# Patient Record
Sex: FEMALE | Race: BLACK OR AFRICAN AMERICAN | NOT HISPANIC OR LATINO | Employment: UNEMPLOYED | URBAN - METROPOLITAN AREA
[De-identification: names, ages, dates, MRNs, and addresses within clinical notes are randomized per-mention and may not be internally consistent; named-entity substitution may affect disease eponyms.]

---

## 2018-04-17 ENCOUNTER — TELEPHONE (OUTPATIENT)
Dept: NEUROSURGERY | Facility: CLINIC | Age: 58
End: 2018-04-17

## 2018-06-06 ENCOUNTER — TELEPHONE (OUTPATIENT)
Dept: NEUROSURGERY | Facility: CLINIC | Age: 58
End: 2018-06-06

## 2019-12-13 ENCOUNTER — OFFICE VISIT (OUTPATIENT)
Dept: FAMILY MEDICINE CLINIC | Facility: CLINIC | Age: 59
End: 2019-12-13

## 2019-12-13 VITALS
HEART RATE: 52 BPM | OXYGEN SATURATION: 99 % | BODY MASS INDEX: 38.82 KG/M2 | HEIGHT: 65 IN | SYSTOLIC BLOOD PRESSURE: 146 MMHG | DIASTOLIC BLOOD PRESSURE: 81 MMHG | TEMPERATURE: 98.3 F | RESPIRATION RATE: 16 BRPM | WEIGHT: 233 LBS

## 2019-12-13 DIAGNOSIS — R22.41 NODULE OF SKIN OF RIGHT LOWER EXTREMITY: ICD-10-CM

## 2019-12-13 DIAGNOSIS — E66.9 OBESITY (BMI 30-39.9): ICD-10-CM

## 2019-12-13 DIAGNOSIS — R20.0 NUMBNESS AND TINGLING OF BOTH FEET: Primary | ICD-10-CM

## 2019-12-13 DIAGNOSIS — M17.11 OSTEOARTHRITIS OF RIGHT KNEE, UNSPECIFIED OSTEOARTHRITIS TYPE: ICD-10-CM

## 2019-12-13 DIAGNOSIS — M25.551 RIGHT HIP PAIN: ICD-10-CM

## 2019-12-13 DIAGNOSIS — R20.2 NUMBNESS AND TINGLING OF BOTH FEET: Primary | ICD-10-CM

## 2019-12-13 PROBLEM — E78.00 HYPERCHOLESTEREMIA: Status: ACTIVE | Noted: 2019-12-13

## 2019-12-13 PROBLEM — R53.83 FATIGUE: Status: ACTIVE | Noted: 2018-10-23

## 2019-12-13 PROBLEM — G89.29 CHRONIC LEFT HIP PAIN: Status: ACTIVE | Noted: 2018-10-23

## 2019-12-13 PROBLEM — R82.90 ABNORMAL URINE FINDING: Status: ACTIVE | Noted: 2018-10-23

## 2019-12-13 PROBLEM — I25.10 CAD (CORONARY ARTERY DISEASE), NATIVE CORONARY ARTERY: Status: ACTIVE | Noted: 2019-12-13

## 2019-12-13 PROBLEM — I16.0 HYPERTENSIVE URGENCY: Status: ACTIVE | Noted: 2018-04-17

## 2019-12-13 PROBLEM — I48.91 ATRIAL FIBRILLATION: Status: ACTIVE | Noted: 2018-05-23

## 2019-12-13 PROBLEM — R29.90 STROKE-LIKE SYMPTOM: Status: ACTIVE | Noted: 2018-04-17

## 2019-12-13 PROBLEM — R00.2 PALPITATIONS: Status: ACTIVE | Noted: 2019-10-18

## 2019-12-13 PROBLEM — R42 DIZZINESS: Status: ACTIVE | Noted: 2018-10-23

## 2019-12-13 PROBLEM — R10.30 LOWER ABDOMINAL PAIN: Status: ACTIVE | Noted: 2019-02-05

## 2019-12-13 PROBLEM — M25.552 CHRONIC LEFT HIP PAIN: Status: ACTIVE | Noted: 2018-10-23

## 2019-12-13 PROCEDURE — 99203 OFFICE O/P NEW LOW 30 MIN: CPT | Performed by: FAMILY MEDICINE

## 2019-12-13 PROCEDURE — 73502 X-RAY EXAM HIP UNI 2-3 VIEWS: CPT | Performed by: FAMILY MEDICINE

## 2019-12-13 RX ORDER — METOPROLOL SUCCINATE 200 MG/1
TABLET, EXTENDED RELEASE ORAL
COMMUNITY
Start: 2019-10-18 | End: 2020-07-21 | Stop reason: SDUPTHER

## 2019-12-13 RX ORDER — CHLORTHALIDONE 25 MG/1
TABLET ORAL
COMMUNITY
Start: 2019-10-18 | End: 2020-07-21 | Stop reason: SDUPTHER

## 2019-12-13 RX ORDER — ATORVASTATIN CALCIUM 20 MG/1
20 TABLET, FILM COATED ORAL DAILY
COMMUNITY
Start: 2019-08-07 | End: 2020-08-06

## 2019-12-13 RX ORDER — ALPRAZOLAM 1 MG/1
1 TABLET ORAL
COMMUNITY
Start: 2017-03-14 | End: 2020-02-06

## 2019-12-13 RX ORDER — PRENATAL VIT 91/IRON/FOLIC/DHA 28-975-200
COMBINATION PACKAGE (EA) ORAL
Status: ON HOLD | COMMUNITY
Start: 2019-01-25 | End: 2020-08-25

## 2019-12-13 RX ORDER — NAPROXEN 500 MG/1
TABLET ORAL
COMMUNITY
Start: 2019-12-03 | End: 2020-07-21 | Stop reason: SDUPTHER

## 2019-12-13 RX ORDER — MECLIZINE HYDROCHLORIDE 25 MG/1
25 TABLET ORAL
COMMUNITY
Start: 2018-10-23 | End: 2020-08-24

## 2019-12-13 RX ORDER — LAMOTRIGINE 200 MG/1
200 TABLET ORAL NIGHTLY
COMMUNITY
Start: 2018-11-15

## 2019-12-13 RX ORDER — OLMESARTAN MEDOXOMIL 20 MG/1
TABLET ORAL
COMMUNITY
Start: 2019-05-14 | End: 2020-07-21 | Stop reason: SDUPTHER

## 2019-12-13 RX ORDER — AMLODIPINE BESYLATE 5 MG/1
TABLET ORAL
COMMUNITY
Start: 2019-10-15 | End: 2020-07-21 | Stop reason: SDUPTHER

## 2019-12-13 RX ORDER — TRAZODONE HYDROCHLORIDE 50 MG/1
TABLET ORAL
Status: ON HOLD | COMMUNITY
Start: 2018-11-15 | End: 2020-08-25

## 2019-12-13 NOTE — PROGRESS NOTES
Subjective   Henny James is a 59 y.o. female.     59-year-old female is a new patient to me presents today with 1 pain right side of her hip which has no feeling but it hurts accordingly patient also reports bilateral stabbing sharp pains in the feet and that they go numb at times.    Per records it looks like she had appointment with orthopedics which she no showed to a couple of months ago.  She has a history of lumbar back pain per records.  She is also referred to neurosurgery in the past.  Did not show up to that appointment.  Looks like her last office visit with the primary care doctor was in January 2019.  History of 5 C-sections with reconstructive surgery.  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  History of vertigo; eye doctor says she is using readers which is contributing to dizziness.    Per records history of toe sweating and burning.    Patient reports follows with a cardiologist.  Is on amlodipine 5 mg a day and chlorthalidone 25 mg a day Toprol- mg a day as well as olmesartan 20 mg a day.  Blood pressure in the office today is 146/81.  Denies chest pain shortness breath headache or vision changes.    Also psychiatry is on Lamictal, Xanax, trazodone.    Today reports lump on right hip for over the past year which has gotten worse in terms of size and is more painful with palpation.  No erythema no color changes no drainage.  No trauma or injury she can recall.  Associated with hip pain.    Also reports bilateral foot numbness and tingling.  Due for several labs today.  Denies history of diabetes.  Reports low back pain chronically.  Also reports left knee osteoarthritis; had laser surgery in the past; pain is returning and getting worse over the past month.    Per records has MRI of cervical spine done in January 2017. This showed a mild spondylolisthesis in the mid cervical spine without high-grade spinal stenosis.  Patient was  referred to neurosurgery in the past however did not make appointment.  Not sure why she was referred.       PHQ-2/PHQ-9 Depression Screening 12/13/2019   Little interest or pleasure in doing things 1   Feeling down, depressed, or hopeless 1   Trouble falling or staying asleep, or sleeping too much 2   Feeling tired or having little energy 3   Feeling bad about yourself - or that you are a failure or have let yourself or your family down 0   Trouble concentrating on things, such as reading the newspaper or watching television 2   Moving or speaking so slowly that other people could have noticed. Or the opposite - being so fidgety or restless that you have been moving around a lot more than usual 0   Thoughts that you would be better off dead, or of hurting yourself in some way 0   Total Score 9   If you checked off any problems, how difficult have these problems made it for you to do your work, take care of things at home, or get along with other people? Very difficult       The following portions of the patient's history were reviewed and updated as appropriate: allergies, current medications, past family history, past medical history, past social history, past surgical history and problem list.    Review of Systems   Constitutional: Negative for chills and fever.   Respiratory: Negative for cough and shortness of breath.    Cardiovascular: Negative for chest pain, palpitations and leg swelling.   Musculoskeletal: Positive for arthralgias, back pain and myalgias.   Neurological: Positive for numbness. Negative for weakness.   Psychiatric/Behavioral: Positive for stress.       Objective   Physical Exam   Constitutional: She appears well-developed and well-nourished.   HENT:   Head: Normocephalic and atraumatic.   Eyes: Pupils are equal, round, and reactive to light. Conjunctivae and EOM are normal.   Neck: Normal range of motion. Neck supple.   Pulmonary/Chest: Effort normal.   Musculoskeletal:   Nodule at right hip  tender to palpation about 1.5 cm in diameter.  No erythema or drainage or surrounding swelling or induration   Psychiatric: She has a normal mood and affect.               Assessment/Plan     Henny was seen today for establish care, hip pain and foot pain.    Diagnoses and all orders for this visit:    Numbness and tingling of both feet  -     Vitamin B12 & Folate  -     Comprehensive Metabolic Panel  -     CBC & Differential  -     Hemoglobin A1c  -     Lipid Panel  -     TSH Rfx On Abnormal To Free T4    Obesity (BMI 30-39.9)  -     Hemoglobin A1c  -     Lipid Panel    Right hip pain  -     XR Hip With or Without Pelvis 2 - 3 View Right    Nodule of skin of right lower extremity  -     US Nonvascular Extremity Limited; Future    Osteoarthritis of right knee, unspecified osteoarthritis type  -     Ambulatory Referral to Orthopedic Surgery      Follow-up x-ray and ultrasound.  Consider referral to general surgery/orthopedics for this as well.  Follow-up labs.  Patient may need to see neurosurgery due to worsening neck pain; associated with numbness of upper extremities bilaterally.  Return to clinic in 6 weeks for follow-up or sooner if needed.  Continue care per psychiatry and cardiology.

## 2019-12-19 ENCOUNTER — TELEPHONE (OUTPATIENT)
Dept: FAMILY MEDICINE CLINIC | Facility: CLINIC | Age: 59
End: 2019-12-19

## 2019-12-19 DIAGNOSIS — M54.2 CHRONIC NECK PAIN: Primary | ICD-10-CM

## 2019-12-19 DIAGNOSIS — G89.29 CHRONIC NECK PAIN: Primary | ICD-10-CM

## 2019-12-27 ENCOUNTER — TELEPHONE (OUTPATIENT)
Dept: FAMILY MEDICINE CLINIC | Facility: CLINIC | Age: 59
End: 2019-12-27

## 2019-12-27 NOTE — TELEPHONE ENCOUNTER
Pt called c/o of vaginal bleeding/spotting today.  Pt wasn't sure if it was coming from rectum - she thinks it's vaginal.  Pt is post menopausal and has hx of cancer.  Advised pt to go to ER - pt prefers to go to Urgent Care because of cost.

## 2020-01-24 ENCOUNTER — OFFICE VISIT (OUTPATIENT)
Dept: FAMILY MEDICINE CLINIC | Facility: CLINIC | Age: 60
End: 2020-01-24

## 2020-01-24 VITALS
RESPIRATION RATE: 16 BRPM | BODY MASS INDEX: 3.83 KG/M2 | DIASTOLIC BLOOD PRESSURE: 76 MMHG | WEIGHT: 23 LBS | HEIGHT: 65 IN | HEART RATE: 51 BPM | OXYGEN SATURATION: 97 % | SYSTOLIC BLOOD PRESSURE: 132 MMHG | TEMPERATURE: 97.7 F

## 2020-01-24 DIAGNOSIS — M43.12 SPONDYLOLISTHESIS OF CERVICAL REGION: ICD-10-CM

## 2020-01-24 DIAGNOSIS — R20.0 NUMBNESS AND TINGLING OF BOTH FEET: Primary | ICD-10-CM

## 2020-01-24 DIAGNOSIS — R22.41 NODULE OF SKIN OF RIGHT LOWER EXTREMITY: ICD-10-CM

## 2020-01-24 DIAGNOSIS — R20.2 NUMBNESS AND TINGLING OF BOTH FEET: Primary | ICD-10-CM

## 2020-01-24 PROCEDURE — 99213 OFFICE O/P EST LOW 20 MIN: CPT | Performed by: FAMILY MEDICINE

## 2020-01-24 RX ORDER — DIAZEPAM 5 MG/1
5 TABLET ORAL
Qty: 1 TABLET | Refills: 0 | Status: SHIPPED | OUTPATIENT
Start: 2020-01-24 | End: 2020-01-24

## 2020-01-24 NOTE — PROGRESS NOTES
Subjective   Henny James is a 59 y.o. female.     59-year-old female presents today for 6-week follow-up on hip pain and numbness and tingling the feet.  Still waiting on lab results she got them done at [facility] therefore we do not have them in our system.  Ultrasound for nodule skin the right lower extremity was negative for any abnormality.  Since then the area near her right hip/outer thigh still painful.    Patient claims she was told by her gynecologist that labs that I had ordered were normal; she had brought the results that appointment; does not have them now.  Wants referral to podiatrist for bilateral numbness of feet.  Still need to review the labs; try to get the from [facility].    Needs new MRI of cervical spine for refer to neurosurgery again his last MRI was in 2017.    Wants referral to podiatry due to continued numbness and tingling of her feet.  Patient was referred to orthopedics for osteoarthritis of right knee.  Reports she gotten the injection thinks it was steroid; helped minimally.  Has follow-up coming soon.           The following portions of the patient's history were reviewed and updated as appropriate: allergies, current medications, past family history, past medical history, past social history, past surgical history and problem list.    Review of Systems   Constitutional: Negative for chills and fever.   Musculoskeletal: Positive for arthralgias, myalgias and neck pain.   Neurological: Negative for weakness and numbness.       Objective   Physical Exam   Constitutional: She appears well-developed and well-nourished.   HENT:   Head: Normocephalic and atraumatic.   Eyes: Pupils are equal, round, and reactive to light. Conjunctivae and EOM are normal.   Neck: Normal range of motion. Neck supple.   Pulmonary/Chest: Effort normal.   Psychiatric: She has a normal mood and affect.               Assessment/Plan     Henny was seen today for numbness and tingling and hip  pain.    Diagnoses and all orders for this visit:    Numbness and tingling of both feet  -     Ambulatory Referral to Podiatry    Nodule of skin of right lower extremity  -     Ambulatory Referral to General Surgery    Spondylolisthesis of cervical region  -     MRI Cervical Spine Without Contrast; Future  -     diazePAM (VALIUM) 5 MG tablet; Take 1 tablet by mouth Once for 1 dose. For cervical MRI

## 2020-02-06 ENCOUNTER — OFFICE VISIT (OUTPATIENT)
Dept: SURGERY | Facility: CLINIC | Age: 60
End: 2020-02-06

## 2020-02-06 VITALS — WEIGHT: 230 LBS | BODY MASS INDEX: 38.32 KG/M2 | HEIGHT: 65 IN

## 2020-02-06 DIAGNOSIS — M25.551 RIGHT HIP PAIN: Primary | ICD-10-CM

## 2020-02-06 PROCEDURE — 99203 OFFICE O/P NEW LOW 30 MIN: CPT | Performed by: SURGERY

## 2020-02-08 ENCOUNTER — HOSPITAL ENCOUNTER (OUTPATIENT)
Dept: MRI IMAGING | Facility: HOSPITAL | Age: 60
Discharge: HOME | End: 2020-02-08
Admitting: FAMILY MEDICINE

## 2020-02-08 DIAGNOSIS — M43.12 SPONDYLOLISTHESIS OF CERVICAL REGION: ICD-10-CM

## 2020-02-08 PROCEDURE — 72141 MRI NECK SPINE W/O DYE: CPT

## 2020-02-19 NOTE — PROGRESS NOTES
General Surgery  Initial Office Visit    CC: Right hip mass    HPI: The patient is a pleasant 59 y.o. year-old lady who presents today for evaluation of a bulge/mass along her right lateral hip that she noticed about 1 month ago after she fell and hit her right hip on a scale on the ground.  Since that time she has noticed the fatty tissue of the right hip looks more protuberant than on the left and is tender to the touch with some paresthesias.  She does not feel a palpable mass at that location and has had no overlying skin changes.  She was referred to see me out of concern that the lump on her right hip may represent a lipoma or a cyst.  Her primary care provider, Dr. Arita, checked an ultrasound of the right hip where there was no solid or cystic soft tissue mass identified at the site of palpable concern.    Past Medical History:   Hypertension  Atrial fibrillation  Coronary artery disease with prior myocardial infarction  Hyperlipidemia  Intermittent claudication  Anemia  Osteoarthritis  Bipolar disorder  History of transient ischemic attack  History of gist tumor of the stomach resected   Anxiety with depression  History of suicide attempt and cocaine abuse    Past Surgical History:   Resection of 7 cm gist tumor of the stomach   section x5  Splenectomy  Tonsillectomy  Tubal ligation    Medications:   Vitamin D  Vitamin B12  Vitamin B6  Aspirin  Lomotil  Xanax  Metoprolol  Hair/nail collagen supplement  Naproxen    Allergies: No known drug allergies    Family History: Mother with coronary artery disease, daughter with bipolar disorder, son with alcohol abuse    Social History: , non-smoker, social alcohol use once per month    ROS:   Constitutional: Positive for activity change, fatigue, and unexpected weight gain; negative for fevers or chills  HENT: Negative for hearing loss or runny nose  Eyes: Negative for vision changes or scleral icterus  Respiratory: Negative for cough or shortness  of breath  Cardiovascular: Positive for irregular heartbeat due to atrial fibrillation; negative for chest pain  Gastrointestinal: Negative for abdominal pain, nausea, vomiting, constipation, melena, or hematochezia  Genitourinary: Negative for hematuria or dysuria  Musculoskeletal: Positive for back pain, joint pain, neck pain, and neck stiffness; negative for joint swelling or gait instability  Neurologic: Negative for tremors or seizures  Psychiatric: Positive for anxiety and depression; negative for suicidal ideations  All other systems reviewed and negative    Physical Exam:  Height: 165 cm  Weight: 104 kg  BMI: 38.3  General: No acute distress, well-nourished & well-developed  HEAD: normocephalic, atraumatic  EYES: normal conjunctiva, sclera anicteric  EARS: grossly normal hearing  NECK: supple, no thyromegaly  CARDIOVASCULAR: regular rate and rhythm  RESPIRATORY: clear to auscultation bilaterally  GASTROINTESTINAL: soft, nontender, non-distended  MUSCULOSKELETAL: There is subtle asymmetry of the right hip in comparison to the left laterally where there is slightly more prominent fatty subcutaneous tissue but there is no underlying palpable well-circumscribed mass to represent a lipoma and no skin mass to represent a sebaceous cyst at that region.  PSYCHIATRIC: oriented x3, normal mood and affect    IMAGING:  Ultrasound extremity nonvascular Limited (right hip), 12/17/2019:  No solid or cystic lesion is seen in the area of palpable complaint over the right hip.  No abnormal blood flow.  No foreign body.    ASSESSMENT & PLAN  Ms. James is a 59-year-old lady with slight asymmetry of her right hip subcutaneous tissue but no underlying palpable lipoma or sebaceous cyst is identified at that location.  I reviewed her most recent ultrasound of the left hip, which confirms my suspicion that there is nothing surgical to be done for her slight asymmetric fat distribution.  Given there is nothing that would require  surgery, she can follow-up with me as needed and I do not feel strongly that she requires any further imaging for work-up of her asymmetric subcutaneous fat around the hip joints.  I suspect she may have some fat necrosis of the subcutaneous fat along the right hip following her recent fall, and this would explain the numbness and intermittent pain with palpation.    Sara Wheeler MD  General and Endoscopic Surgery  [facility]    [address], [address]  [place], [place], [address]  P: [phone]  F: [phone]

## 2020-02-21 DIAGNOSIS — M43.12 SPONDYLOLISTHESIS OF CERVICAL REGION: ICD-10-CM

## 2020-02-21 DIAGNOSIS — R20.0 NUMBNESS AND TINGLING OF BOTH FEET: Primary | ICD-10-CM

## 2020-02-21 DIAGNOSIS — R20.2 NUMBNESS AND TINGLING OF BOTH FEET: Primary | ICD-10-CM

## 2020-02-21 NOTE — PROGRESS NOTES
MRI of cervical spine does not show spondylolisthesis.  There is some minimal canal narrowing at C4-C5 and C5-C6.  There is mild to moderate foraminal narrowing at C3-C4.  Due to continued neck pain and and issues will refer to neurosurgery for further assessment.  No disc herniation.  Please place referral for neurosurgery.  We had placed 1 before however they wanted an up-to-date MRI of her cervical spine before they saw her.

## 2020-07-21 ENCOUNTER — OFFICE VISIT (OUTPATIENT)
Dept: FAMILY MEDICINE CLINIC | Facility: CLINIC | Age: 60
End: 2020-07-21

## 2020-07-21 VITALS
RESPIRATION RATE: 16 BRPM | DIASTOLIC BLOOD PRESSURE: 88 MMHG | BODY MASS INDEX: 38.49 KG/M2 | HEART RATE: 55 BPM | TEMPERATURE: 96.6 F | OXYGEN SATURATION: 98 % | WEIGHT: 231 LBS | HEIGHT: 65 IN | SYSTOLIC BLOOD PRESSURE: 140 MMHG

## 2020-07-21 DIAGNOSIS — G89.29 CHRONIC PAIN OF BOTH KNEES: ICD-10-CM

## 2020-07-21 DIAGNOSIS — M25.562 CHRONIC PAIN OF BOTH KNEES: ICD-10-CM

## 2020-07-21 DIAGNOSIS — M17.0 OSTEOARTHRITIS OF BOTH KNEES, UNSPECIFIED OSTEOARTHRITIS TYPE: ICD-10-CM

## 2020-07-21 DIAGNOSIS — M25.561 CHRONIC PAIN OF BOTH KNEES: ICD-10-CM

## 2020-07-21 DIAGNOSIS — Z90.81 HISTORY OF SPLENECTOMY: Primary | ICD-10-CM

## 2020-07-21 DIAGNOSIS — Z23 ENCOUNTER FOR ADMINISTRATION OF VACCINE: ICD-10-CM

## 2020-07-21 DIAGNOSIS — I10 ESSENTIAL HYPERTENSION: ICD-10-CM

## 2020-07-21 PROCEDURE — 99214 OFFICE O/P EST MOD 30 MIN: CPT | Performed by: FAMILY MEDICINE

## 2020-07-21 RX ORDER — OLMESARTAN MEDOXOMIL 20 MG/1
20 TABLET ORAL DAILY
Qty: 30 TABLET | Refills: 1 | Status: SHIPPED | OUTPATIENT
Start: 2020-07-21 | End: 2020-08-27 | Stop reason: HOSPADM

## 2020-07-21 RX ORDER — CHLORTHALIDONE 25 MG/1
25 TABLET ORAL DAILY
Qty: 30 TABLET | Refills: 1 | Status: SHIPPED | OUTPATIENT
Start: 2020-07-21 | End: 2020-08-27 | Stop reason: HOSPADM

## 2020-07-21 RX ORDER — AMLODIPINE BESYLATE 5 MG/1
5 TABLET ORAL DAILY
Qty: 30 TABLET | Refills: 1 | Status: ON HOLD | OUTPATIENT
Start: 2020-07-21 | End: 2020-08-26 | Stop reason: SDUPTHER

## 2020-07-21 RX ORDER — NAPROXEN 500 MG/1
500 TABLET ORAL 2 TIMES DAILY PRN
Qty: 60 TABLET | Refills: 1 | Status: SHIPPED | OUTPATIENT
Start: 2020-07-21 | End: 2020-12-07

## 2020-07-21 RX ORDER — ARIPIPRAZOLE 10 MG/1
10 TABLET ORAL NIGHTLY
COMMUNITY

## 2020-07-21 RX ORDER — METOPROLOL SUCCINATE 200 MG/1
200 TABLET, EXTENDED RELEASE ORAL DAILY
Qty: 30 TABLET | Refills: 1 | Status: SHIPPED | OUTPATIENT
Start: 2020-07-21 | End: 2020-09-16

## 2020-07-22 PROCEDURE — 90734 MENACWYD/MENACWYCRM VACC IM: CPT | Performed by: FAMILY MEDICINE

## 2020-07-22 PROCEDURE — 90471 IMMUNIZATION ADMIN: CPT | Performed by: FAMILY MEDICINE

## 2020-08-24 ENCOUNTER — APPOINTMENT (OUTPATIENT)
Dept: CT IMAGING | Facility: HOSPITAL | Age: 60
End: 2020-08-24

## 2020-08-24 ENCOUNTER — APPOINTMENT (OUTPATIENT)
Dept: GENERAL RADIOLOGY | Facility: HOSPITAL | Age: 60
End: 2020-08-24

## 2020-08-24 ENCOUNTER — HOSPITAL ENCOUNTER (INPATIENT)
Facility: HOSPITAL | Age: 60
LOS: 3 days | Discharge: HOME | End: 2020-08-27
Attending: EMERGENCY MEDICINE | Admitting: HOSPITALIST

## 2020-08-24 ENCOUNTER — OFFICE VISIT (OUTPATIENT)
Dept: FAMILY MEDICINE CLINIC | Facility: CLINIC | Age: 60
End: 2020-08-24

## 2020-08-24 VITALS
HEIGHT: 65 IN | BODY MASS INDEX: 37.99 KG/M2 | HEART RATE: 55 BPM | TEMPERATURE: 97.1 F | SYSTOLIC BLOOD PRESSURE: 184 MMHG | DIASTOLIC BLOOD PRESSURE: 81 MMHG | WEIGHT: 228 LBS

## 2020-08-24 DIAGNOSIS — I16.0 HYPERTENSIVE URGENCY: Primary | ICD-10-CM

## 2020-08-24 DIAGNOSIS — H53.9 VISION DISTURBANCE: Primary | ICD-10-CM

## 2020-08-24 DIAGNOSIS — I10 HYPERTENSION NOT AT GOAL: ICD-10-CM

## 2020-08-24 DIAGNOSIS — I10 ESSENTIAL HYPERTENSION: ICD-10-CM

## 2020-08-24 PROBLEM — F31.9 BIPOLAR 1 DISORDER: Status: ACTIVE | Noted: 2020-08-24

## 2020-08-24 PROBLEM — E78.5 HLD (HYPERLIPIDEMIA): Status: ACTIVE | Noted: 2019-12-13

## 2020-08-24 PROBLEM — E87.6 HYPOKALEMIA: Status: ACTIVE | Noted: 2020-08-24

## 2020-08-24 LAB
ALBUMIN SERPL-MCNC: 4.4 G/DL (ref 3.5–5.2)
ALBUMIN SERPL-MCNC: 4.5 G/DL (ref 3.5–5.2)
ALBUMIN/GLOB SERPL: 1.1 G/DL
ALBUMIN/GLOB SERPL: 1.2 G/DL
ALP SERPL-CCNC: 79 U/L (ref 39–117)
ALP SERPL-CCNC: 81 U/L (ref 39–117)
ALT SERPL W P-5'-P-CCNC: 22 U/L (ref 1–33)
ALT SERPL W P-5'-P-CCNC: 23 U/L (ref 1–33)
AMPHET+METHAMPHET UR QL: NEGATIVE
ANION GAP SERPL CALCULATED.3IONS-SCNC: 10.7 MMOL/L (ref 5–15)
ANION GAP SERPL CALCULATED.3IONS-SCNC: 12.7 MMOL/L (ref 5–15)
AST SERPL-CCNC: 19 U/L (ref 1–32)
AST SERPL-CCNC: 20 U/L (ref 1–32)
BARBITURATES UR QL SCN: NEGATIVE
BASOPHILS # BLD AUTO: 0.06 10*3/MM3 (ref 0–0.2)
BASOPHILS NFR BLD AUTO: 0.9 % (ref 0–1.5)
BENZODIAZ UR QL SCN: NEGATIVE
BILIRUB SERPL-MCNC: 0.2 MG/DL (ref 0–1.2)
BILIRUB SERPL-MCNC: 0.3 MG/DL (ref 0–1.2)
BILIRUB UR QL STRIP: NEGATIVE
BUN SERPL-MCNC: 13 MG/DL (ref 6–20)
BUN SERPL-MCNC: 13 MG/DL (ref 6–20)
BUN/CREAT SERPL: 19.4 (ref 7–25)
BUN/CREAT SERPL: 25 (ref 7–25)
CALCIUM SPEC-SCNC: 9.4 MG/DL (ref 8.6–10.5)
CALCIUM SPEC-SCNC: 9.7 MG/DL (ref 8.6–10.5)
CANNABINOIDS SERPL QL: POSITIVE
CHLORIDE SERPL-SCNC: 100 MMOL/L (ref 98–107)
CHLORIDE SERPL-SCNC: 98 MMOL/L (ref 98–107)
CLARITY UR: CLEAR
CO2 SERPL-SCNC: 24.3 MMOL/L (ref 22–29)
CO2 SERPL-SCNC: 28.3 MMOL/L (ref 22–29)
COCAINE UR QL: NEGATIVE
COLOR UR: YELLOW
CREAT SERPL-MCNC: 0.52 MG/DL (ref 0.57–1)
CREAT SERPL-MCNC: 0.67 MG/DL (ref 0.57–1)
DEPRECATED RDW RBC AUTO: 45.7 FL (ref 37–54)
DEPRECATED RDW RBC AUTO: 47.2 FL (ref 37–54)
EOSINOPHIL # BLD AUTO: 0.08 10*3/MM3 (ref 0–0.4)
EOSINOPHIL NFR BLD AUTO: 1.2 % (ref 0.3–6.2)
ERYTHROCYTE [DISTWIDTH] IN BLOOD BY AUTOMATED COUNT: 14.2 % (ref 12.3–15.4)
ERYTHROCYTE [DISTWIDTH] IN BLOOD BY AUTOMATED COUNT: 14.4 % (ref 12.3–15.4)
ETHANOL BLD-MCNC: <10 MG/DL (ref 0–10)
ETHANOL UR QL: <0.01 %
GFR SERPL CREATININE-BSD FRML MDRD: 110 ML/MIN/1.73
GFR SERPL CREATININE-BSD FRML MDRD: 147 ML/MIN/1.73
GLOBULIN UR ELPH-MCNC: 3.7 GM/DL
GLOBULIN UR ELPH-MCNC: 4 GM/DL
GLUCOSE BLDC GLUCOMTR-MCNC: 93 MG/DL (ref 70–130)
GLUCOSE SERPL-MCNC: 100 MG/DL (ref 65–99)
GLUCOSE SERPL-MCNC: 86 MG/DL (ref 65–99)
GLUCOSE UR STRIP-MCNC: NEGATIVE MG/DL
HCT VFR BLD AUTO: 38.1 % (ref 34–46.6)
HCT VFR BLD AUTO: 39 % (ref 34–46.6)
HGB BLD-MCNC: 12.5 G/DL (ref 12–15.9)
HGB BLD-MCNC: 12.7 G/DL (ref 12–15.9)
HGB UR QL STRIP.AUTO: NEGATIVE
HOLD SPECIMEN: NORMAL
IMM GRANULOCYTES # BLD AUTO: 0.03 10*3/MM3 (ref 0–0.05)
IMM GRANULOCYTES NFR BLD AUTO: 0.5 % (ref 0–0.5)
KETONES UR QL STRIP: NEGATIVE
LEUKOCYTE ESTERASE UR QL STRIP.AUTO: NEGATIVE
LYMPHOCYTES # BLD AUTO: 2.99 10*3/MM3 (ref 0.7–3.1)
LYMPHOCYTES NFR BLD AUTO: 46.2 % (ref 19.6–45.3)
MCH RBC QN AUTO: 28.5 PG (ref 26.6–33)
MCH RBC QN AUTO: 29.3 PG (ref 26.6–33)
MCHC RBC AUTO-ENTMCNC: 32.1 G/DL (ref 31.5–35.7)
MCHC RBC AUTO-ENTMCNC: 33.3 G/DL (ref 31.5–35.7)
MCV RBC AUTO: 88 FL (ref 79–97)
MCV RBC AUTO: 88.8 FL (ref 79–97)
METHADONE UR QL SCN: NEGATIVE
MONOCYTES # BLD AUTO: 0.58 10*3/MM3 (ref 0.1–0.9)
MONOCYTES NFR BLD AUTO: 9 % (ref 5–12)
NEUTROPHILS NFR BLD AUTO: 2.73 10*3/MM3 (ref 1.7–7)
NEUTROPHILS NFR BLD AUTO: 42.2 % (ref 42.7–76)
NITRITE UR QL STRIP: NEGATIVE
NRBC BLD AUTO-RTO: 0 /100 WBC (ref 0–0.2)
OPIATES UR QL: NEGATIVE
OXYCODONE UR QL SCN: NEGATIVE
PH UR STRIP.AUTO: 7.5 [PH] (ref 5–8)
PLATELET # BLD AUTO: 291 10*3/MM3 (ref 140–450)
PLATELET # BLD AUTO: 442 10*3/MM3 (ref 140–450)
PMV BLD AUTO: 10.4 FL (ref 6–12)
PMV BLD AUTO: 11.2 FL (ref 6–12)
POTASSIUM SERPL-SCNC: 2.9 MMOL/L (ref 3.5–5.2)
POTASSIUM SERPL-SCNC: 3.2 MMOL/L (ref 3.5–5.2)
PROT SERPL-MCNC: 8.1 G/DL (ref 6–8.5)
PROT SERPL-MCNC: 8.5 G/DL (ref 6–8.5)
PROT UR QL STRIP: NEGATIVE
RBC # BLD AUTO: 4.33 10*6/MM3 (ref 3.77–5.28)
RBC # BLD AUTO: 4.39 10*6/MM3 (ref 3.77–5.28)
SODIUM SERPL-SCNC: 137 MMOL/L (ref 136–145)
SODIUM SERPL-SCNC: 137 MMOL/L (ref 136–145)
SP GR UR STRIP: 1.02 (ref 1–1.03)
TROPONIN T SERPL-MCNC: <0.01 NG/ML (ref 0–0.03)
UROBILINOGEN UR QL STRIP: NORMAL
WBC # BLD AUTO: 6.47 10*3/MM3 (ref 3.4–10.8)
WBC # BLD AUTO: 9.17 10*3/MM3 (ref 3.4–10.8)
WHOLE BLOOD HOLD SPECIMEN: NORMAL

## 2020-08-24 PROCEDURE — G0378 HOSPITAL OBSERVATION PER HR: HCPCS

## 2020-08-24 PROCEDURE — 93005 ELECTROCARDIOGRAM TRACING: CPT | Performed by: PHYSICIAN ASSISTANT

## 2020-08-24 PROCEDURE — 80307 DRUG TEST PRSMV CHEM ANLYZR: CPT | Performed by: PHYSICIAN ASSISTANT

## 2020-08-24 PROCEDURE — 81003 URINALYSIS AUTO W/O SCOPE: CPT | Performed by: PHYSICIAN ASSISTANT

## 2020-08-24 PROCEDURE — U0004 COV-19 TEST NON-CDC HGH THRU: HCPCS | Performed by: EMERGENCY MEDICINE

## 2020-08-24 PROCEDURE — 80053 COMPREHEN METABOLIC PANEL: CPT | Performed by: PHYSICIAN ASSISTANT

## 2020-08-24 PROCEDURE — 25010000002 PROCHLORPERAZINE 10 MG/2ML SOLUTION: Performed by: PHYSICIAN ASSISTANT

## 2020-08-24 PROCEDURE — 25010000002 DEXAMETHASONE SODIUM PHOSPHATE 20 MG/5ML SOLUTION: Performed by: PHYSICIAN ASSISTANT

## 2020-08-24 PROCEDURE — 85027 COMPLETE CBC AUTOMATED: CPT | Performed by: NURSE PRACTITIONER

## 2020-08-24 PROCEDURE — 85025 COMPLETE CBC W/AUTO DIFF WBC: CPT | Performed by: PHYSICIAN ASSISTANT

## 2020-08-24 PROCEDURE — 80053 COMPREHEN METABOLIC PANEL: CPT | Performed by: NURSE PRACTITIONER

## 2020-08-24 PROCEDURE — 99213 OFFICE O/P EST LOW 20 MIN: CPT | Performed by: FAMILY MEDICINE

## 2020-08-24 PROCEDURE — 70450 CT HEAD/BRAIN W/O DYE: CPT

## 2020-08-24 PROCEDURE — 93010 ELECTROCARDIOGRAM REPORT: CPT | Performed by: INTERNAL MEDICINE

## 2020-08-24 PROCEDURE — 84484 ASSAY OF TROPONIN QUANT: CPT | Performed by: PHYSICIAN ASSISTANT

## 2020-08-24 PROCEDURE — 93005 ELECTROCARDIOGRAM TRACING: CPT | Performed by: EMERGENCY MEDICINE

## 2020-08-24 PROCEDURE — 25010000002 DIPHENHYDRAMINE PER 50 MG: Performed by: PHYSICIAN ASSISTANT

## 2020-08-24 PROCEDURE — 82962 GLUCOSE BLOOD TEST: CPT

## 2020-08-24 PROCEDURE — 71045 X-RAY EXAM CHEST 1 VIEW: CPT

## 2020-08-24 PROCEDURE — 99285 EMERGENCY DEPT VISIT HI MDM: CPT

## 2020-08-24 RX ORDER — LOSARTAN POTASSIUM 50 MG/1
50 TABLET ORAL
Status: DISCONTINUED | OUTPATIENT
Start: 2020-08-24 | End: 2020-08-24

## 2020-08-24 RX ORDER — DEXAMETHASONE SODIUM PHOSPHATE 4 MG/ML
4 INJECTION, SOLUTION INTRA-ARTICULAR; INTRALESIONAL; INTRAMUSCULAR; INTRAVENOUS; SOFT TISSUE ONCE
Status: COMPLETED | OUTPATIENT
Start: 2020-08-24 | End: 2020-08-24

## 2020-08-24 RX ORDER — SODIUM CHLORIDE 0.9 % (FLUSH) 0.9 %
10 SYRINGE (ML) INJECTION AS NEEDED
Status: DISCONTINUED | OUTPATIENT
Start: 2020-08-24 | End: 2020-08-24

## 2020-08-24 RX ORDER — SODIUM CHLORIDE 0.9 % (FLUSH) 0.9 %
10 SYRINGE (ML) INJECTION EVERY 12 HOURS SCHEDULED
Status: DISCONTINUED | OUTPATIENT
Start: 2020-08-24 | End: 2020-08-24

## 2020-08-24 RX ORDER — AMLODIPINE BESYLATE 5 MG/1
5 TABLET ORAL DAILY
Status: DISCONTINUED | OUTPATIENT
Start: 2020-08-24 | End: 2020-08-27

## 2020-08-24 RX ORDER — ACETAMINOPHEN 650 MG/1
650 SUPPOSITORY RECTAL EVERY 4 HOURS PRN
Status: DISCONTINUED | OUTPATIENT
Start: 2020-08-24 | End: 2020-08-27 | Stop reason: HOSPADM

## 2020-08-24 RX ORDER — LABETALOL HYDROCHLORIDE 5 MG/ML
20 INJECTION, SOLUTION INTRAVENOUS ONCE
Status: DISCONTINUED | OUTPATIENT
Start: 2020-08-24 | End: 2020-08-24

## 2020-08-24 RX ORDER — CHLORTHALIDONE 25 MG/1
25 TABLET ORAL DAILY
Status: DISCONTINUED | OUTPATIENT
Start: 2020-08-24 | End: 2020-08-24

## 2020-08-24 RX ORDER — ASPIRIN 325 MG
325 TABLET ORAL DAILY
Status: DISCONTINUED | OUTPATIENT
Start: 2020-08-24 | End: 2020-08-27 | Stop reason: HOSPADM

## 2020-08-24 RX ORDER — ACETAMINOPHEN 325 MG/1
650 TABLET ORAL EVERY 4 HOURS PRN
Status: DISCONTINUED | OUTPATIENT
Start: 2020-08-24 | End: 2020-08-27 | Stop reason: HOSPADM

## 2020-08-24 RX ORDER — METOPROLOL SUCCINATE 100 MG/1
200 TABLET, EXTENDED RELEASE ORAL DAILY
Status: DISCONTINUED | OUTPATIENT
Start: 2020-08-24 | End: 2020-08-27 | Stop reason: HOSPADM

## 2020-08-24 RX ORDER — POTASSIUM CHLORIDE 7.45 MG/ML
10 INJECTION INTRAVENOUS
Status: DISCONTINUED | OUTPATIENT
Start: 2020-08-24 | End: 2020-08-27 | Stop reason: HOSPADM

## 2020-08-24 RX ORDER — ONDANSETRON 2 MG/ML
4 INJECTION INTRAMUSCULAR; INTRAVENOUS EVERY 6 HOURS PRN
Status: DISCONTINUED | OUTPATIENT
Start: 2020-08-24 | End: 2020-08-27 | Stop reason: HOSPADM

## 2020-08-24 RX ORDER — ASPIRIN 300 MG/1
300 SUPPOSITORY RECTAL DAILY
Status: DISCONTINUED | OUTPATIENT
Start: 2020-08-24 | End: 2020-08-27 | Stop reason: HOSPADM

## 2020-08-24 RX ORDER — PROCHLORPERAZINE EDISYLATE 5 MG/ML
10 INJECTION INTRAMUSCULAR; INTRAVENOUS EVERY 6 HOURS PRN
Status: DISCONTINUED | OUTPATIENT
Start: 2020-08-24 | End: 2020-08-27 | Stop reason: HOSPADM

## 2020-08-24 RX ORDER — AMLODIPINE BESYLATE 5 MG/1
5 TABLET ORAL ONCE
Status: DISCONTINUED | OUTPATIENT
Start: 2020-08-24 | End: 2020-08-24

## 2020-08-24 RX ORDER — LABETALOL HYDROCHLORIDE 5 MG/ML
20 INJECTION, SOLUTION INTRAVENOUS
Status: DISCONTINUED | OUTPATIENT
Start: 2020-08-24 | End: 2020-08-27 | Stop reason: HOSPADM

## 2020-08-24 RX ORDER — DIPHENHYDRAMINE HYDROCHLORIDE 50 MG/ML
25 INJECTION INTRAMUSCULAR; INTRAVENOUS ONCE
Status: COMPLETED | OUTPATIENT
Start: 2020-08-24 | End: 2020-08-24

## 2020-08-24 RX ORDER — ATORVASTATIN CALCIUM 80 MG/1
80 TABLET, FILM COATED ORAL NIGHTLY
Status: DISCONTINUED | OUTPATIENT
Start: 2020-08-24 | End: 2020-08-27

## 2020-08-24 RX ORDER — ARIPIPRAZOLE 5 MG/1
10 TABLET ORAL DAILY
Status: DISCONTINUED | OUTPATIENT
Start: 2020-08-25 | End: 2020-08-26

## 2020-08-24 RX ORDER — POTASSIUM CHLORIDE 750 MG/1
40 CAPSULE, EXTENDED RELEASE ORAL AS NEEDED
Status: DISCONTINUED | OUTPATIENT
Start: 2020-08-24 | End: 2020-08-27 | Stop reason: HOSPADM

## 2020-08-24 RX ORDER — METOPROLOL SUCCINATE 100 MG/1
200 TABLET, EXTENDED RELEASE ORAL ONCE
Status: DISCONTINUED | OUTPATIENT
Start: 2020-08-24 | End: 2020-08-24

## 2020-08-24 RX ORDER — POTASSIUM CHLORIDE 1.5 G/1.77G
40 POWDER, FOR SOLUTION ORAL AS NEEDED
Status: DISCONTINUED | OUTPATIENT
Start: 2020-08-24 | End: 2020-08-27 | Stop reason: HOSPADM

## 2020-08-24 RX ADMIN — METOPROLOL SUCCINATE 200 MG: 100 TABLET, EXTENDED RELEASE ORAL at 22:52

## 2020-08-24 RX ADMIN — PROCHLORPERAZINE EDISYLATE 10 MG: 5 INJECTION INTRAMUSCULAR; INTRAVENOUS at 16:03

## 2020-08-24 RX ADMIN — AMLODIPINE BESYLATE 5 MG: 5 TABLET ORAL at 22:52

## 2020-08-24 RX ADMIN — POTASSIUM CHLORIDE 40 MEQ: 10 CAPSULE, COATED, EXTENDED RELEASE ORAL at 23:34

## 2020-08-24 RX ADMIN — ATORVASTATIN CALCIUM 80 MG: 80 TABLET, FILM COATED ORAL at 21:47

## 2020-08-24 RX ADMIN — ASPIRIN 325 MG: 325 TABLET ORAL at 19:08

## 2020-08-24 RX ADMIN — DIPHENHYDRAMINE HYDROCHLORIDE 25 MG: 50 INJECTION, SOLUTION INTRAMUSCULAR; INTRAVENOUS at 16:03

## 2020-08-24 RX ADMIN — DEXAMETHASONE SODIUM PHOSPHATE 4 MG: 4 INJECTION, SOLUTION INTRAMUSCULAR; INTRAVENOUS at 16:03

## 2020-08-24 NOTE — PROGRESS NOTES
Subjective   Henny James is a 60 y.o. female.     History of Present Illness     60-year-old female presents today with concern regarding headaches and blurred vision for the past few days.  At last visit July/2020 we discussed needing to work on elevated blood pressure.  We refilled her blood pressure medications which include amlodipine and chlorthalidone, losartan and metoprolol.  She was supposed to reschedule follow-up with her cardiologist.  Has not done this yet.  Blood pressure in the office today 184/81. Today feeling has a bad BURKS, base of her head, blurry vision and has lost a day memory wise.    Patient was also advised to reschedule appointment with neurosurgery for issues with neck pain.        The following portions of the patient's history were reviewed and updated as appropriate: allergies, current medications, past family history, past medical history, past social history, past surgical history and problem list.    Review of Systems   Constitutional: Negative for chills and fever.   HENT: Negative for congestion.    Eyes: Positive for blurred vision.   Respiratory: Negative for cough and shortness of breath.    Cardiovascular: Negative for chest pain, palpitations and leg swelling.   Gastrointestinal: Negative for abdominal pain, diarrhea and vomiting.   Neurological: Positive for headache.       Objective   Physical Exam   Constitutional: She appears well-developed and well-nourished.   HENT:   Head: Normocephalic and atraumatic.   Mouth/Throat: Uvula is midline, oropharynx is clear and moist and mucous membranes are normal.   Eyes: Pupils are equal, round, and reactive to light. EOM are normal.   Cardiovascular: Normal rate and regular rhythm.   No murmur heard.  Pulmonary/Chest: Effort normal and breath sounds normal. No stridor. No respiratory distress. She has no wheezes. She has no rales.   Neurological: She is alert.   Skin: Skin is warm.   Psychiatric: She has a normal mood and affect. Her  behavior is normal.               Assessment/Plan     Henny was seen today for headache and blurred vision.    Diagnoses and all orders for this visit:    Hypertensive urgency      Given HA blurry vision and BP above 180 with memory issues advised to go to ER. Advised via EMS however patient refused AMA and said she would drive herself.

## 2020-08-25 ENCOUNTER — APPOINTMENT (OUTPATIENT)
Dept: MRI IMAGING | Facility: HOSPITAL | Age: 60
End: 2020-08-25

## 2020-08-25 PROBLEM — R73.03 PREDIABETES: Status: ACTIVE | Noted: 2020-08-25

## 2020-08-25 LAB
ALBUMIN SERPL-MCNC: 4.2 G/DL (ref 3.5–5.2)
ALBUMIN/GLOB SERPL: 1.1 G/DL
ALP SERPL-CCNC: 72 U/L (ref 39–117)
ALT SERPL W P-5'-P-CCNC: 21 U/L (ref 1–33)
ANION GAP SERPL CALCULATED.3IONS-SCNC: 10.2 MMOL/L (ref 5–15)
AST SERPL-CCNC: 21 U/L (ref 1–32)
BILIRUB SERPL-MCNC: 0.3 MG/DL (ref 0–1.2)
BUN SERPL-MCNC: 14 MG/DL (ref 6–20)
BUN/CREAT SERPL: 27.5 (ref 7–25)
CALCIUM SPEC-SCNC: 9.5 MG/DL (ref 8.6–10.5)
CHLORIDE SERPL-SCNC: 102 MMOL/L (ref 98–107)
CHOLEST SERPL-MCNC: 263 MG/DL (ref 0–200)
CO2 SERPL-SCNC: 21.8 MMOL/L (ref 22–29)
CREAT SERPL-MCNC: 0.51 MG/DL (ref 0.57–1)
DEPRECATED RDW RBC AUTO: 44.9 FL (ref 37–54)
ERYTHROCYTE [DISTWIDTH] IN BLOOD BY AUTOMATED COUNT: 14.1 % (ref 12.3–15.4)
GFR SERPL CREATININE-BSD FRML MDRD: 150 ML/MIN/1.73
GLOBULIN UR ELPH-MCNC: 4 GM/DL
GLUCOSE BLDC GLUCOMTR-MCNC: 108 MG/DL (ref 70–130)
GLUCOSE BLDC GLUCOMTR-MCNC: 121 MG/DL (ref 70–130)
GLUCOSE BLDC GLUCOMTR-MCNC: 125 MG/DL (ref 70–130)
GLUCOSE SERPL-MCNC: 112 MG/DL (ref 65–99)
HBA1C MFR BLD: 6.1 % (ref 4.8–5.6)
HCT VFR BLD AUTO: 39.4 % (ref 34–46.6)
HDLC SERPL-MCNC: 60 MG/DL (ref 40–60)
HGB BLD-MCNC: 13 G/DL (ref 12–15.9)
LDLC SERPL CALC-MCNC: 189 MG/DL (ref 0–100)
LDLC/HDLC SERPL: 3.15 {RATIO}
MCH RBC QN AUTO: 28.8 PG (ref 26.6–33)
MCHC RBC AUTO-ENTMCNC: 33 G/DL (ref 31.5–35.7)
MCV RBC AUTO: 87.4 FL (ref 79–97)
PLATELET # BLD AUTO: 370 10*3/MM3 (ref 140–450)
PMV BLD AUTO: 11.1 FL (ref 6–12)
POTASSIUM SERPL-SCNC: 4.7 MMOL/L (ref 3.5–5.2)
PROT SERPL-MCNC: 8.2 G/DL (ref 6–8.5)
RBC # BLD AUTO: 4.51 10*6/MM3 (ref 3.77–5.28)
REF LAB TEST METHOD: NORMAL
SARS-COV-2 RNA RESP QL NAA+PROBE: NOT DETECTED
SODIUM SERPL-SCNC: 134 MMOL/L (ref 136–145)
TRIGL SERPL-MCNC: 71 MG/DL (ref 0–150)
VIT B12 BLD-MCNC: >2000 PG/ML (ref 211–946)
VLDLC SERPL-MCNC: 14.2 MG/DL (ref 5–40)
WBC # BLD AUTO: 9.69 10*3/MM3 (ref 3.4–10.8)

## 2020-08-25 PROCEDURE — G0378 HOSPITAL OBSERVATION PER HR: HCPCS

## 2020-08-25 PROCEDURE — 83036 HEMOGLOBIN GLYCOSYLATED A1C: CPT | Performed by: NURSE PRACTITIONER

## 2020-08-25 PROCEDURE — 82962 GLUCOSE BLOOD TEST: CPT

## 2020-08-25 PROCEDURE — 97165 OT EVAL LOW COMPLEX 30 MIN: CPT

## 2020-08-25 PROCEDURE — 82607 VITAMIN B-12: CPT | Performed by: HOSPITALIST

## 2020-08-25 PROCEDURE — 36415 COLL VENOUS BLD VENIPUNCTURE: CPT | Performed by: NURSE PRACTITIONER

## 2020-08-25 PROCEDURE — 80053 COMPREHEN METABOLIC PANEL: CPT | Performed by: NURSE PRACTITIONER

## 2020-08-25 PROCEDURE — 97535 SELF CARE MNGMENT TRAINING: CPT

## 2020-08-25 PROCEDURE — 80061 LIPID PANEL: CPT | Performed by: NURSE PRACTITIONER

## 2020-08-25 PROCEDURE — 85027 COMPLETE CBC AUTOMATED: CPT | Performed by: NURSE PRACTITIONER

## 2020-08-25 RX ORDER — LAMOTRIGINE 100 MG/1
200 TABLET ORAL NIGHTLY
Status: DISCONTINUED | OUTPATIENT
Start: 2020-08-25 | End: 2020-08-27 | Stop reason: HOSPADM

## 2020-08-25 RX ORDER — DIAZEPAM 5 MG/1
10 TABLET ORAL ONCE AS NEEDED
Status: DISCONTINUED | OUTPATIENT
Start: 2020-08-25 | End: 2020-08-27 | Stop reason: HOSPADM

## 2020-08-25 RX ORDER — DIAZEPAM 5 MG/1
5 TABLET ORAL ONCE AS NEEDED
Status: COMPLETED | OUTPATIENT
Start: 2020-08-25 | End: 2020-08-25

## 2020-08-25 RX ADMIN — LAMOTRIGINE 200 MG: 100 TABLET ORAL at 21:27

## 2020-08-25 RX ADMIN — ATORVASTATIN CALCIUM 80 MG: 80 TABLET, FILM COATED ORAL at 21:28

## 2020-08-25 RX ADMIN — DIAZEPAM 5 MG: 5 TABLET ORAL at 10:16

## 2020-08-25 RX ADMIN — POTASSIUM CHLORIDE 40 MEQ: 10 CAPSULE, COATED, EXTENDED RELEASE ORAL at 01:07

## 2020-08-25 RX ADMIN — ASPIRIN 325 MG: 325 TABLET ORAL at 10:16

## 2020-08-25 RX ADMIN — ARIPIPRAZOLE 10 MG: 5 TABLET ORAL at 10:16

## 2020-08-25 RX ADMIN — POTASSIUM CHLORIDE 40 MEQ: 10 CAPSULE, COATED, EXTENDED RELEASE ORAL at 03:21

## 2020-08-26 ENCOUNTER — APPOINTMENT (OUTPATIENT)
Dept: CT IMAGING | Facility: HOSPITAL | Age: 60
End: 2020-08-26

## 2020-08-26 PROBLEM — H53.9 VISION DISTURBANCE: Status: RESOLVED | Noted: 2020-08-24 | Resolved: 2020-08-26

## 2020-08-26 PROBLEM — R29.90 STROKE-LIKE SYMPTOMS: Status: RESOLVED | Noted: 2018-04-17 | Resolved: 2020-08-26

## 2020-08-26 LAB
ALBUMIN SERPL-MCNC: 4 G/DL (ref 3.5–5.2)
ALBUMIN/GLOB SERPL: 1.1 G/DL
ALP SERPL-CCNC: 68 U/L (ref 39–117)
ALT SERPL W P-5'-P-CCNC: 21 U/L (ref 1–33)
ANION GAP SERPL CALCULATED.3IONS-SCNC: 10.4 MMOL/L (ref 5–15)
AST SERPL-CCNC: 19 U/L (ref 1–32)
BILIRUB SERPL-MCNC: 0.2 MG/DL (ref 0–1.2)
BUN SERPL-MCNC: 12 MG/DL (ref 6–20)
BUN/CREAT SERPL: 26.7 (ref 7–25)
CALCIUM SPEC-SCNC: 9.1 MG/DL (ref 8.6–10.5)
CHLORIDE SERPL-SCNC: 102 MMOL/L (ref 98–107)
CO2 SERPL-SCNC: 22.6 MMOL/L (ref 22–29)
CREAT SERPL-MCNC: 0.45 MG/DL (ref 0.57–1)
DEPRECATED RDW RBC AUTO: 42.4 FL (ref 37–54)
ERYTHROCYTE [DISTWIDTH] IN BLOOD BY AUTOMATED COUNT: 13.9 % (ref 12.3–15.4)
GFR SERPL CREATININE-BSD FRML MDRD: >150 ML/MIN/1.73
GLOBULIN UR ELPH-MCNC: 3.8 GM/DL
GLUCOSE SERPL-MCNC: 93 MG/DL (ref 65–99)
HCT VFR BLD AUTO: 37.3 % (ref 34–46.6)
HGB BLD-MCNC: 12.5 G/DL (ref 12–15.9)
MCH RBC QN AUTO: 28.5 PG (ref 26.6–33)
MCHC RBC AUTO-ENTMCNC: 33.5 G/DL (ref 31.5–35.7)
MCV RBC AUTO: 85 FL (ref 79–97)
PLATELET # BLD AUTO: 349 10*3/MM3 (ref 140–450)
PMV BLD AUTO: 11.2 FL (ref 6–12)
POTASSIUM SERPL-SCNC: 3.3 MMOL/L (ref 3.5–5.2)
PROT SERPL-MCNC: 7.8 G/DL (ref 6–8.5)
RBC # BLD AUTO: 4.39 10*6/MM3 (ref 3.77–5.28)
SODIUM SERPL-SCNC: 135 MMOL/L (ref 136–145)
WBC # BLD AUTO: 9.13 10*3/MM3 (ref 3.4–10.8)

## 2020-08-26 PROCEDURE — 70496 CT ANGIOGRAPHY HEAD: CPT

## 2020-08-26 PROCEDURE — 99254 IP/OBS CNSLTJ NEW/EST MOD 60: CPT | Performed by: PSYCHIATRY & NEUROLOGY

## 2020-08-26 PROCEDURE — 36415 COLL VENOUS BLD VENIPUNCTURE: CPT | Performed by: NURSE PRACTITIONER

## 2020-08-26 PROCEDURE — G0378 HOSPITAL OBSERVATION PER HR: HCPCS

## 2020-08-26 PROCEDURE — 70498 CT ANGIOGRAPHY NECK: CPT

## 2020-08-26 PROCEDURE — 80053 COMPREHEN METABOLIC PANEL: CPT | Performed by: NURSE PRACTITIONER

## 2020-08-26 PROCEDURE — 0 IOPAMIDOL PER 1 ML: Performed by: HOSPITALIST

## 2020-08-26 PROCEDURE — 85027 COMPLETE CBC AUTOMATED: CPT | Performed by: NURSE PRACTITIONER

## 2020-08-26 RX ORDER — ATORVASTATIN CALCIUM 80 MG/1
80 TABLET, FILM COATED ORAL NIGHTLY
Qty: 30 TABLET | Refills: 0 | Status: SHIPPED | OUTPATIENT
Start: 2020-08-26 | End: 2020-08-27 | Stop reason: HOSPADM

## 2020-08-26 RX ORDER — LOSARTAN POTASSIUM 50 MG/1
50 TABLET ORAL
Status: DISCONTINUED | OUTPATIENT
Start: 2020-08-26 | End: 2020-08-27 | Stop reason: HOSPADM

## 2020-08-26 RX ORDER — LABETALOL HYDROCHLORIDE 5 MG/ML
10 INJECTION, SOLUTION INTRAVENOUS ONCE
Status: COMPLETED | OUTPATIENT
Start: 2020-08-26 | End: 2020-08-26

## 2020-08-26 RX ORDER — ARIPIPRAZOLE 5 MG/1
10 TABLET ORAL NIGHTLY
Status: DISCONTINUED | OUTPATIENT
Start: 2020-08-26 | End: 2020-08-27 | Stop reason: HOSPADM

## 2020-08-26 RX ORDER — LOSARTAN POTASSIUM 50 MG/1
50 TABLET ORAL
Qty: 30 TABLET | Refills: 0 | Status: SHIPPED | OUTPATIENT
Start: 2020-08-26 | End: 2020-09-16

## 2020-08-26 RX ORDER — AMLODIPINE BESYLATE 5 MG/1
5 TABLET ORAL DAILY
Qty: 60 TABLET | Refills: 1 | Status: SHIPPED | OUTPATIENT
Start: 2020-08-26 | End: 2020-08-27 | Stop reason: HOSPADM

## 2020-08-26 RX ADMIN — POTASSIUM CHLORIDE 40 MEQ: 10 CAPSULE, COATED, EXTENDED RELEASE ORAL at 17:24

## 2020-08-26 RX ADMIN — METOPROLOL SUCCINATE 200 MG: 100 TABLET, EXTENDED RELEASE ORAL at 08:13

## 2020-08-26 RX ADMIN — ARIPIPRAZOLE 10 MG: 5 TABLET ORAL at 21:59

## 2020-08-26 RX ADMIN — LABETALOL HYDROCHLORIDE 10 MG: 5 INJECTION, SOLUTION INTRAVENOUS at 01:51

## 2020-08-26 RX ADMIN — LOSARTAN POTASSIUM 50 MG: 50 TABLET, FILM COATED ORAL at 15:39

## 2020-08-26 RX ADMIN — POTASSIUM CHLORIDE 40 MEQ: 10 CAPSULE, COATED, EXTENDED RELEASE ORAL at 23:01

## 2020-08-26 RX ADMIN — AMLODIPINE BESYLATE 5 MG: 5 TABLET ORAL at 08:15

## 2020-08-26 RX ADMIN — LAMOTRIGINE 200 MG: 100 TABLET ORAL at 21:59

## 2020-08-26 RX ADMIN — ATORVASTATIN CALCIUM 80 MG: 80 TABLET, FILM COATED ORAL at 21:58

## 2020-08-26 RX ADMIN — ASPIRIN 325 MG: 325 TABLET ORAL at 08:15

## 2020-08-26 RX ADMIN — IOPAMIDOL 95 ML: 755 INJECTION, SOLUTION INTRAVENOUS at 17:58

## 2020-08-27 ENCOUNTER — READMISSION MANAGEMENT (OUTPATIENT)
Dept: CALL CENTER | Facility: HOSPITAL | Age: 60
End: 2020-08-27

## 2020-08-27 VITALS
SYSTOLIC BLOOD PRESSURE: 152 MMHG | WEIGHT: 230.16 LBS | DIASTOLIC BLOOD PRESSURE: 80 MMHG | OXYGEN SATURATION: 100 % | RESPIRATION RATE: 18 BRPM | HEART RATE: 60 BPM | HEIGHT: 65 IN | TEMPERATURE: 98.3 F | BODY MASS INDEX: 38.35 KG/M2

## 2020-08-27 LAB
ALBUMIN SERPL-MCNC: 3.9 G/DL (ref 3.5–5.2)
ALBUMIN/GLOB SERPL: 1.1 G/DL
ALP SERPL-CCNC: 72 U/L (ref 39–117)
ALT SERPL W P-5'-P-CCNC: 22 U/L (ref 1–33)
ANION GAP SERPL CALCULATED.3IONS-SCNC: 10.2 MMOL/L (ref 5–15)
AST SERPL-CCNC: 19 U/L (ref 1–32)
BILIRUB SERPL-MCNC: 0.3 MG/DL (ref 0–1.2)
BUN SERPL-MCNC: 12 MG/DL (ref 6–20)
BUN/CREAT SERPL: 21.4 (ref 7–25)
CALCIUM SPEC-SCNC: 9.3 MG/DL (ref 8.6–10.5)
CHLORIDE SERPL-SCNC: 104 MMOL/L (ref 98–107)
CO2 SERPL-SCNC: 22.8 MMOL/L (ref 22–29)
CREAT SERPL-MCNC: 0.56 MG/DL (ref 0.57–1)
DEPRECATED RDW RBC AUTO: 44.1 FL (ref 37–54)
ERYTHROCYTE [DISTWIDTH] IN BLOOD BY AUTOMATED COUNT: 14.2 % (ref 12.3–15.4)
GFR SERPL CREATININE-BSD FRML MDRD: 135 ML/MIN/1.73
GLOBULIN UR ELPH-MCNC: 3.5 GM/DL
GLUCOSE SERPL-MCNC: 97 MG/DL (ref 65–99)
HCT VFR BLD AUTO: 37 % (ref 34–46.6)
HGB BLD-MCNC: 12.7 G/DL (ref 12–15.9)
MCH RBC QN AUTO: 29.3 PG (ref 26.6–33)
MCHC RBC AUTO-ENTMCNC: 34.3 G/DL (ref 31.5–35.7)
MCV RBC AUTO: 85.5 FL (ref 79–97)
PLATELET # BLD AUTO: 440 10*3/MM3 (ref 140–450)
PMV BLD AUTO: 9.8 FL (ref 6–12)
POTASSIUM SERPL-SCNC: 4.1 MMOL/L (ref 3.5–5.2)
PROT SERPL-MCNC: 7.4 G/DL (ref 6–8.5)
RBC # BLD AUTO: 4.33 10*6/MM3 (ref 3.77–5.28)
SODIUM SERPL-SCNC: 137 MMOL/L (ref 136–145)
TSH SERPL DL<=0.05 MIU/L-ACNC: 2.52 UIU/ML (ref 0.27–4.2)
WBC # BLD AUTO: 6.16 10*3/MM3 (ref 3.4–10.8)

## 2020-08-27 PROCEDURE — 80053 COMPREHEN METABOLIC PANEL: CPT | Performed by: NURSE PRACTITIONER

## 2020-08-27 PROCEDURE — 36415 COLL VENOUS BLD VENIPUNCTURE: CPT | Performed by: NURSE PRACTITIONER

## 2020-08-27 PROCEDURE — G0378 HOSPITAL OBSERVATION PER HR: HCPCS

## 2020-08-27 PROCEDURE — 99233 SBSQ HOSP IP/OBS HIGH 50: CPT | Performed by: NURSE PRACTITIONER

## 2020-08-27 PROCEDURE — 85027 COMPLETE CBC AUTOMATED: CPT | Performed by: NURSE PRACTITIONER

## 2020-08-27 PROCEDURE — 84443 ASSAY THYROID STIM HORMONE: CPT | Performed by: HOSPITALIST

## 2020-08-27 RX ORDER — AMLODIPINE BESYLATE 5 MG/1
5 TABLET ORAL ONCE
Status: COMPLETED | OUTPATIENT
Start: 2020-08-27 | End: 2020-08-27

## 2020-08-27 RX ORDER — ROSUVASTATIN CALCIUM 40 MG/1
40 TABLET, COATED ORAL DAILY
Status: DISCONTINUED | OUTPATIENT
Start: 2020-08-27 | End: 2020-08-27 | Stop reason: HOSPADM

## 2020-08-27 RX ORDER — LOPERAMIDE HYDROCHLORIDE 2 MG/1
4 CAPSULE ORAL 3 TIMES DAILY PRN
Status: DISCONTINUED | OUTPATIENT
Start: 2020-08-27 | End: 2020-08-27 | Stop reason: HOSPADM

## 2020-08-27 RX ORDER — ROSUVASTATIN CALCIUM 40 MG/1
40 TABLET, COATED ORAL NIGHTLY
Qty: 30 TABLET | Refills: 1 | Status: SHIPPED | OUTPATIENT
Start: 2020-08-27 | End: 2020-08-31

## 2020-08-27 RX ORDER — AMLODIPINE BESYLATE 10 MG/1
10 TABLET ORAL DAILY
Status: DISCONTINUED | OUTPATIENT
Start: 2020-08-28 | End: 2020-08-27 | Stop reason: HOSPADM

## 2020-08-27 RX ORDER — AMLODIPINE BESYLATE 10 MG/1
10 TABLET ORAL DAILY
Qty: 30 TABLET | Refills: 1 | Status: SHIPPED | OUTPATIENT
Start: 2020-08-28 | End: 2021-08-04 | Stop reason: SDUPTHER

## 2020-08-27 RX ADMIN — METOPROLOL SUCCINATE 200 MG: 100 TABLET, EXTENDED RELEASE ORAL at 09:36

## 2020-08-27 RX ADMIN — ASPIRIN 325 MG: 325 TABLET ORAL at 09:34

## 2020-08-27 RX ADMIN — AMLODIPINE BESYLATE 5 MG: 5 TABLET ORAL at 14:33

## 2020-08-27 RX ADMIN — AMLODIPINE BESYLATE 5 MG: 5 TABLET ORAL at 09:36

## 2020-08-27 RX ADMIN — LOSARTAN POTASSIUM 50 MG: 50 TABLET, FILM COATED ORAL at 09:34

## 2020-08-27 NOTE — OUTREACH NOTE
Prep Survey      Responses   [facility], [place] facility patient discharged from?  [place]   Is LACE score < 7 ?  Yes   Eligibility  [facility]   Date of Admission  08/24/20   Date of Discharge  08/27/20   Discharge Disposition  Home or Self Care   Discharge diagnosis  hypertensive urgency  Stroke-like symptoms   COVID-19 Test Status  Negative   Does the patient have one of the following disease processes/diagnoses(primary or secondary)?  Other   Does the patient have Home health ordered?  No   Is there a DME ordered?  No   Prep survey completed?  Yes          Farzana Daniels RN

## 2020-08-28 ENCOUNTER — TRANSITIONAL CARE MANAGEMENT TELEPHONE ENCOUNTER (OUTPATIENT)
Dept: CALL CENTER | Facility: HOSPITAL | Age: 60
End: 2020-08-28

## 2020-08-28 NOTE — OUTREACH NOTE
"Call Center TCM Note      Responses   [facility], [place], operated by [facility] patient discharged from?  [place]   COVID-19 Test Status  Negative   Does the patient have one of the following disease processes/diagnoses(primary or secondary)?  Other   TCM attempt successful?  Yes   Discharge diagnosis  hypertensive urgency  Stroke-like symptoms   Meds reviewed with patient/caregiver?  Yes   Is the patient having any side effects they believe may be caused by any medication additions or changes?  No   Does the patient have all medications ordered at discharge?  Yes   Is the patient taking all medications as directed (includes completed medication regime)?  Yes   Does the patient have a primary care provider?   Yes   Does the patient have an appointment with their PCP within 7 days of discharge?  Yes   Comments regarding PCP  Cheng Parra MD   Has the patient kept scheduled appointments due by today?  Yes   Has home health visited the patient within 72 hours of discharge?  N/A   Did the patient receive a copy of their discharge instructions?  Yes   Nursing interventions  Reviewed instructions with patient   What is the patient's perception of their health status since discharge?  Improving   Is the patient/caregiver able to teach back signs and symptoms related to disease process for when to call PCP?  Yes   Is the patient/caregiver able to teach back signs and symptoms related to disease process for when to call 911?  Yes   Is the patient/caregiver able to teach back the hierarchy of who to call/visit for symptoms/problems? PCP, Specialist, Home health nurse, Urgent Care, ED, 911  Yes   TCM call completed?  Yes   Wrap up additional comments  Pt states she is better, arm numbess and speech issues resolved. Pt states she does still have \"dull pain\" at back of her head. Confirms receipt and understanding of medications. Pt is looking online for BP cuff to have in home. Pt will see PCP for TCM [facility] on 08/31/2020          Farzana Hernandez, " MA    8/28/2020, 15:42

## 2020-08-31 ENCOUNTER — OFFICE VISIT (OUTPATIENT)
Dept: FAMILY MEDICINE CLINIC | Facility: CLINIC | Age: 60
End: 2020-08-31

## 2020-08-31 VITALS
TEMPERATURE: 98.3 F | HEART RATE: 55 BPM | WEIGHT: 233 LBS | BODY MASS INDEX: 38.82 KG/M2 | DIASTOLIC BLOOD PRESSURE: 89 MMHG | HEIGHT: 65 IN | SYSTOLIC BLOOD PRESSURE: 168 MMHG

## 2020-08-31 DIAGNOSIS — E66.9 OBESITY (BMI 30-39.9): ICD-10-CM

## 2020-08-31 DIAGNOSIS — M43.12 SPONDYLOLISTHESIS OF CERVICAL REGION: ICD-10-CM

## 2020-08-31 DIAGNOSIS — F51.04 PSYCHOPHYSIOLOGICAL INSOMNIA: ICD-10-CM

## 2020-08-31 DIAGNOSIS — G47.10 HYPERSOMNIA: ICD-10-CM

## 2020-08-31 DIAGNOSIS — I10 UNCONTROLLED HYPERTENSION: ICD-10-CM

## 2020-08-31 DIAGNOSIS — M54.2 CHRONIC NECK PAIN: ICD-10-CM

## 2020-08-31 DIAGNOSIS — E78.5 HYPERLIPIDEMIA, UNSPECIFIED HYPERLIPIDEMIA TYPE: ICD-10-CM

## 2020-08-31 DIAGNOSIS — G89.29 CHRONIC NECK PAIN: ICD-10-CM

## 2020-08-31 DIAGNOSIS — Z90.81 HISTORY OF SPLENECTOMY: ICD-10-CM

## 2020-08-31 DIAGNOSIS — G47.8 NON-RESTORATIVE SLEEP: ICD-10-CM

## 2020-08-31 DIAGNOSIS — Z09 HOSPITAL DISCHARGE FOLLOW-UP: Primary | ICD-10-CM

## 2020-08-31 DIAGNOSIS — Z23 ENCOUNTER FOR ADMINISTRATION OF VACCINE: ICD-10-CM

## 2020-08-31 PROCEDURE — 90471 IMMUNIZATION ADMIN: CPT | Performed by: FAMILY MEDICINE

## 2020-08-31 PROCEDURE — 90734 MENACWYD/MENACWYCRM VACC IM: CPT | Performed by: FAMILY MEDICINE

## 2020-08-31 PROCEDURE — 99213 OFFICE O/P EST LOW 20 MIN: CPT | Performed by: FAMILY MEDICINE

## 2020-08-31 RX ORDER — ROSUVASTATIN CALCIUM 40 MG/1
40 TABLET, COATED ORAL DAILY
Qty: 90 TABLET | Refills: 1 | Status: SHIPPED | OUTPATIENT
Start: 2020-08-31 | End: 2020-09-30

## 2020-08-31 NOTE — PATIENT INSTRUCTIONS
Go over your medications when you go home.  Call me if you have any questions or concerns about what you are taking all you should be taking.

## 2020-08-31 NOTE — PROGRESS NOTES
Immunization  Immunization performed in LD by Mary Busch LPN. Patient tolerated the procedure well without complications.  08/31/20   Mary Busch LPN

## 2020-08-31 NOTE — PROGRESS NOTES
Zaria James is a 60 y.o. female.     History of Present Illness     60-year-old female presents today for hospital discharge follow-up.    Date of Discharge:  8/27/2020     PCP: Cheng Parra MD     Discharge Diagnosis:         Active Hospital Problems     Diagnosis   POA   • Prediabetes [R73.03]   Yes   • Hypokalemia [E87.6]   Yes   • Bipolar 1 disorder (CMS/HCC) [F31.9]   Yes   • CAD (coronary artery disease), native coronary artery [I25.10]   Yes   • HLD (hyperlipidemia) [E78.5]   Yes   • Atrial fibrillation (CMS/HCC) [I48.91]   Yes   • Uncontrolled hypertension [I10]   Yes       Resolved Hospital Problems     Diagnosis Date Resolved POA   • **Stroke-like symptoms [R29.90] 08/26/2020 Yes   • Vision disturbance [H53.9] 08/26/2020 Yes      Procedures Performed             Consults      Date and Time Order Name Status Description     8/24/2020 4117 Inpatient Neurology Consult Stroke Completed       8/24/2020 1538 [facility] (on-call MD unless specified) Details Completed            Hospital Course  Please see history and physical for details. Patient is a 60 y.o. female admitted with left arm numbness, difficulty with word finding, and headache time several days with floaters.  Patient was hypertensive on arrival 233/104.  She had not been all of her blood pressure medications due to cost.  Neurologic exam in the emergency room with no focal neurologic deficits.  CT head was negative for acute intracranial process but does show extensive small vessel ischemic disease and remote cerebellar infarcts.  Patient refused MRI but is agreeable to an open MRI as an outpatient.  CTA head and neck shows no acute infarct hemorrhage or dissection.  Etiology of symptoms likely to be hypertensive urgency. Patient was started on a statin for reported a loose stool this morning and a leg cramp which she attributed to the medication.  Neurology change statin to Crestor.  Blood pressure has remained poorly controlled but  manual blood pressures this admission have been lower.  She was continued on amlodipine and metoprolol but losartan was added yesterday.  Blood pressure still elevated this morning.  Additional dose of amlodipine 5 mg was administered and this afternoon her blood pressure is 152/80.  He has no neurologic deficits since admission and denies chest pain, shortness of breath, or headache.  She is stable for discharge to home to follow-up with her primary care physician for BP management.  patient, family, nursing staff and consulting provider.       Discharge Medications            New Medications      Instructions Start Date   losartan 50 MG tablet  Commonly known as:  COZAAR  Replaces:  olmesartan 20 MG tablet    50 mg, Oral, Every 24 Hours Scheduled        rosuvastatin 40 MG tablet  Commonly known as:  CRESTOR    40 mg, Oral, Nightly                      Changes to Medications      Instructions Start Date   amLODIPine 10 MG tablet  Commonly known as:  NORVASC  What changed:    · medication strength  · how much to take    10 mg, Oral, Daily    Start Date:  August 28, 2020                    Continue These Medications      Instructions Start Date   ARIPiprazole 10 MG tablet  Commonly known as:  ABILIFY    10 mg, Oral, Nightly        aspirin 81 MG chewable tablet    81 mg, Oral, Daily        B-12 PO    Oral        B-6 PO    Oral        lamoTRIgine 200 MG tablet  Commonly known as:  LaMICtal    200 mg, Oral, Nightly        metoprolol succinate  MG 24 hr tablet  Commonly known as:  TOPROL-XL    200 mg, Oral, Daily        naproxen 500 MG tablet  Commonly known as:  NAPROSYN    500 mg, Oral, 2 Times Daily PRN        VITAMIN E PO    Oral        Xanax 1 MG tablet  Generic drug:  ALPRAZolam    1 mg, Oral, 3 Times Daily PRN            Stop These Medications    chlorthalidone 25 MG tablet  Commonly known as:  HYGROTON      olmesartan 20 MG tablet  Commonly known as:  BENICAR  Replaced by:  losartan 50 MG tablet      --------------------------------------    Blood pressure medications include losartan 50 mg a day, amlodipine 10 mg a day, metoprolol succinate  mg a day.  Blood pressure in the office today 168/89.  Blood pressure readings at home unknown as she is still waiting for her BP monitor to arrive in the mail.  Denies chest pain shortness of breath or headache with vision changes.      The following portions of the patient's history were reviewed and updated as appropriate: allergies, current medications, past family history, past medical history, past social history, past surgical history and problem list.    Review of Systems   Constitutional: Negative for chills and fever.   HENT: Negative for congestion.    Respiratory: Negative for cough and shortness of breath.    Cardiovascular: Negative for chest pain, palpitations and leg swelling.   Gastrointestinal: Negative for abdominal pain, diarrhea and vomiting.       Objective   Physical Exam   Constitutional: She appears well-developed and well-nourished.   HENT:   Head: Normocephalic and atraumatic.   Mouth/Throat: Uvula is midline, oropharynx is clear and moist and mucous membranes are normal.   Eyes: Pupils are equal, round, and reactive to light. EOM are normal.   Cardiovascular: Normal rate and regular rhythm.   No murmur heard.  Pulmonary/Chest: Effort normal and breath sounds normal. No stridor. No respiratory distress. She has no wheezes. She has no rales.   Neurological: She is alert.   Skin: Skin is warm.   Psychiatric: She has a normal mood and affect. Her behavior is normal.               Assessment/Plan     Henny was seen today for hypertension.    Diagnoses and all orders for this visit:    Hospital discharge follow-up  -     Ambulatory Referral to Cardiology    Uncontrolled hypertension  -     Ambulatory Referral to Cardiology    Psychophysiological insomnia    Hyperlipidemia, unspecified hyperlipidemia type  -     rosuvastatin (CRESTOR) 40 MG  tablet; Take 1 tablet by mouth Daily.    Hypersomnia  -     Home Sleep Study; Future    Non-restorative sleep  -     Home Sleep Study; Future    Spondylolisthesis of cervical region  -     Ambulatory Referral to Neurosurgery    Chronic neck pain  -     Ambulatory Referral to Neurosurgery    Obesity (BMI 30-39.9)  -     Home Sleep Study; Future    Encounter for administration of vaccine  -     Meningococcal Conjugate Vaccine 4-Valent IM    History of splenectomy  -     Meningococcal Conjugate Vaccine 4-Valent IM      Went over discharge medications with patient in great detail.  She reports that while she is supposed to be on Crestor she does not recall having at home at all.  Requesting prescription today.    Patient acknowledges that she is taking losartan amlodipine and metoprolol for her blood pressure.    For some reason she thinks she is taking a fourth medication for her blood pressure.  We discussed that she is no longer supposed to take chlorthalidone or on losartan.  She acknowledges that she is not taking either of those medications.  Will go home and check her medications and let me know if there is any confusion.    Patient received her second meningococcal vaccine today in the series due to history of splenectomy.  Next meningococcal vaccine due in 5 years.    We will get patient in with a new cardiologist due to uncontrolled hypertension.  Needs new referral to neurosurgery.  Follow-up sleep study.  Does not like trazodone for insomnia will try melatonin.  Return to clinic in 6 weeks for follow-up or sooner if needed.  Go to ER for new or worsening symptoms.  Bring in your blood pressure monitor when arrives in the mail to compare to our blood pressure numbers to make sure it works correctly.  Patient expressed understanding and agreeable to plan.

## 2020-09-03 NOTE — ED PROVIDER NOTES
" EMERGENCY DEPARTMENT ENCOUNTER    Room Number:  S520/1  Date seen:  9/3/2020  Time seen: 11:05 AM  PCP: Cheng Parra MD  Historian: patient      HPI:  Chief Complaint: speech changes, headache, memory issues    A complete HPI/ROS/PMH/PSH/SH/FH are unobtainable due to: none    Context: Henny James is a 60 y.o. female who presents to the ED for evaluation of symptoms that onset gradually approximately 2 weeks and have been intermittent, moderate, and nothing seems to make them worse or better. She notes seeing floaters in her Bilateral eyes intermittently, headaches, feeling like she is having trouble finding her thoughts or words, \"losing time,\" states she showed up for a  on the wrong day. She is very anxious and distressed about these symptoms. She went to her PCP today and was also noted to be quite hypertensive and was referred to the ER for further evaluation and treatment.     She denies chest pain, shortness of breath, fevers, numbness/weakness. Denies any difficulty walking or with balance.         PAST MEDICAL HISTORY  Active Ambulatory Problems     Diagnosis Date Noted   • Abnormal urine finding 10/23/2018   • Anemia 2013   • Atrial fibrillation (CMS/HCC) 2018   • CAD (coronary artery disease), native coronary artery 2019   • Chest pain syndrome 2015   • Chronic left hip pain 10/23/2018   • Dizziness 10/23/2018   • Fatigue 10/23/2018   • HLD (hyperlipidemia) 2019   • Hypertensive urgency 2018   • Intermittent claudication (CMS/HCC) 10/23/2013   • Long term (current) use of anticoagulants 2013   • Lower abdominal pain 2019   • Neoplasm of uncertain behavior of connective and other soft tissue 2013   • Palpitations 10/18/2019   • Personal history of other specified conditions presenting hazards to health 2013   • Personal history of tobacco use, presenting hazards to health 2013   • Thrombocytosis (CMS/HCC) 2015   • Transient " "ischemic attack (TIA), and cerebral infarction without residual deficits(V12.54) 2013   • Uncontrolled hypertension 2013   • Psychophysiological insomnia 2020     Resolved Ambulatory Problems     Diagnosis Date Noted   • Stroke-like symptoms 2018     Past Medical History:   Diagnosis Date   • Anxiety    • Arthritis    • Bipolar 1 disorder (CMS/HCC)    • CAD (coronary artery disease)    • Depression    • Heart palpitations    • History of cocaine abuse (CMS/[facility])    • History of gastric cancer 10/2011   • History of gastrointestinal bleeding    • History of kidney surgery    • History of myocardial infarction    • History of suicide attempt    • History of thrombocytosis 2015   • Hypercholesteremia    • Hypertension    • Stroke (CMS/HCC)    • Thrombus of left atrial appendage 10/2011   • TIA (transient ischemic attack)          PAST SURGICAL HISTORY  Past Surgical History:   Procedure Laterality Date   • CARDIAC CATHETERIZATION     •  SECTION     • ENDOMETRIAL BIOPSY N/A 01/10/2020    Negative for Atypia, Negative for Malignancy - Mohit Fay   • EXPLORATORY LAPAROTOMY N/A 10/17/2011    w/ Resection of Gastrointestinal Stromal Tumor   • SPLENECTOMY     • TONSILLECTOMY     • TUBAL ABDOMINAL LIGATION           FAMILY HISTORY  Family History   Problem Relation Age of Onset   • Bipolar disorder Daughter    • Hypertension Son    • Alcohol abuse Son          SOCIAL HISTORY  Social History     Socioeconomic History   • Marital status:      Spouse name: Not on file   • Number of children: 5   • Years of education: 13   • Highest education level: Not on file   Occupational History   • Occupation: SECTION MANAGER   Tobacco Use   • Smoking status: Never Smoker   • Smokeless tobacco: Never Used   Substance and Sexual Activity   • Alcohol use: Yes     Comment: SOCIALLY   • Drug use: Not Currently     Types: \"Crack\" cocaine     Comment: STOPPED 8 YEARS AGO   • Sexual " activity: Defer   Social History Narrative    DOES NOT LIVE ALONE         ALLERGIES  Patient has no known allergies.        REVIEW OF SYSTEMS  Review of Systems     All systems reviewed and negative except for those discussed in HPI.       PHYSICAL EXAM  ED Triage Vitals   Temp Heart Rate Resp BP SpO2   08/24/20 1242 08/24/20 1242 08/24/20 1242 08/24/20 1242 08/24/20 1242   96.7 °F (35.9 °C) 67 16 (!) 233/104 98 %      Temp src Heart Rate Source Patient Position BP Location FiO2 (%)   08/24/20 1242 08/24/20 1414 08/24/20 1805 08/24/20 1805 --   Tympanic Monitor Lying Right arm          GENERAL: not distressed  HENT: atraumatic  EYES: no scleral icterus  CV: regular rhythm, regular rate  RESPIRATORY: normal effort, ctab  ABDOMEN: soft, nontender  MUSCULOSKELETAL: no deformity  NEURO: GCS is 15  Cranial nerves II-XII are intact.  No facial droop. Uvula is midline. No tongue deviation. PERRL and EOMI. There is no dysarthria, aphasia or slurred speech.  Sensation is intact to light touch bilaterally and is symmetrical in the face, BUE and BLE.  Strength is 5/5 and symmetrical in the BUE and BLE.  Finger to nose, heel to shin, and rapid alternating movements are intact.  NIH - 0  SKIN: warm, dry    Vital signs and nursing notes reviewed.          LAB RESULTS  Labs Reviewed   COMPREHENSIVE METABOLIC PANEL - Abnormal; Notable for the following components:       Result Value    Potassium 3.2 (*)     All other components within normal limits    Narrative:     GFR Normal >60  Chronic Kidney Disease <60  Kidney Failure <15     URINE DRUG SCREEN - Abnormal; Notable for the following components:    THC, Screen, Urine Positive (*)     All other components within normal limits    Narrative:     Negative Thresholds For Drugs Screened:     Amphetamines               500 ng/ml   Barbiturates               200 ng/ml   Benzodiazepines            100 ng/ml   Cocaine                    300 ng/ml   Methadone                  300 ng/ml    Opiates                    300 ng/ml   Oxycodone                  100 ng/ml   THC                        50 ng/ml    The Normal Value for all drugs tested is negative. This report includes final unconfirmed screening results to be used for medical treatment purposes only. Unconfirmed results must not be used for non-medical purposes such as employment or legal testing. Clinical consideration should be applied to any drug of abuse test, particulary when unconfirmed results are used.   CBC WITH AUTO DIFFERENTIAL - Abnormal; Notable for the following components:    Neutrophil % 42.2 (*)     Lymphocyte % 46.2 (*)     All other components within normal limits   COMPREHENSIVE METABOLIC PANEL - Abnormal; Notable for the following components:    Glucose 100 (*)     Creatinine 0.52 (*)     Potassium 2.9 (*)     All other components within normal limits    Narrative:     GFR Normal >60  Chronic Kidney Disease <60  Kidney Failure <15     COMPREHENSIVE METABOLIC PANEL - Abnormal; Notable for the following components:    Glucose 112 (*)     Creatinine 0.51 (*)     Sodium 134 (*)     CO2 21.8 (*)     BUN/Creatinine Ratio 27.5 (*)     All other components within normal limits    Narrative:     GFR Normal >60  Chronic Kidney Disease <60  Kidney Failure <15     HEMOGLOBIN A1C - Abnormal; Notable for the following components:    Hemoglobin A1C 6.10 (*)     All other components within normal limits    Narrative:     Hemoglobin A1C Ranges:    Increased Risk for Diabetes  5.7% to 6.4%  Diabetes                     >= 6.5%  Diabetic Goal                < 7.0%   LIPID PANEL - Abnormal; Notable for the following components:    Total Cholesterol 263 (*)     LDL Cholesterol  189 (*)     All other components within normal limits    Narrative:     Cholesterol Reference Ranges  (U.S. Department of Health and Human Services ATP III Classifications)    Desirable          <200 mg/dL  Borderline High    200-239 mg/dL  High Risk          >240  mg/dL      Triglyceride Reference Ranges  (U.S. Department of Health and Human Services ATP III Classifications)    Normal           <150 mg/dL  Borderline High  150-199 mg/dL  High             200-499 mg/dL  Very High        >500 mg/dL    HDL Reference Ranges  (U.S. Department of Health and Human Services ATP III Classifcations)    Low     <40 mg/dl (major risk factor for CHD)  High    >60 mg/dl ('negative' risk factor for CHD)        LDL Reference Ranges  (U.S. Department of Health and Human Services ATP III Classifcations)    Optimal          <100 mg/dL  Near Optimal     100-129 mg/dL  Borderline High  130-159 mg/dL  High             160-189 mg/dL  Very High        >189 mg/dL   VITAMIN B12 - Abnormal; Notable for the following components:    Vitamin B-12 >2,000 (*)     All other components within normal limits    Narrative:     Results may be falsely increased if patient taking Biotin.     COMPREHENSIVE METABOLIC PANEL - Abnormal; Notable for the following components:    Creatinine 0.45 (*)     Sodium 135 (*)     Potassium 3.3 (*)     BUN/Creatinine Ratio 26.7 (*)     All other components within normal limits    Narrative:     GFR Normal >60  Chronic Kidney Disease <60  Kidney Failure <15     COMPREHENSIVE METABOLIC PANEL - Abnormal; Notable for the following components:    Creatinine 0.56 (*)     All other components within normal limits    Narrative:     GFR Normal >60  Chronic Kidney Disease <60  Kidney Failure <15     TROPONIN (IN-HOUSE) - Normal    Narrative:     Troponin T Reference Range:  <= 0.03 ng/mL-   Negative for AMI  >0.03 ng/mL-     Abnormal for myocardial necrosis.  Clinicians would have to utilize clinical acumen, EKG, Troponin and serial changes to determine if it is an Acute Myocardial Infarction or myocardial injury due to an underlying chronic condition.       Results may be falsely decreased if patient taking Biotin.     URINALYSIS W/ MICROSCOPIC IF INDICATED (NO CULTURE) - Normal     Narrative:     Urine microscopic not indicated.   CBC (NO DIFF) - Normal   CBC (NO DIFF) - Normal   CBC (NO DIFF) - Normal   CBC (NO DIFF) - Normal   TSH - Normal   POCT GLUCOSE FINGERSTICK - Normal   POCT GLUCOSE FINGERSTICK - Normal   POCT GLUCOSE FINGERSTICK - Normal   POCT GLUCOSE FINGERSTICK - Normal   COVID PRE-OP / PRE-PROCEDURE SCREENING ORDER (NO ISOLATION)    Narrative:     The following orders were created for panel order COVID PRE-OP / PRE-PROCEDURE SCREENING ORDER (NO ISOLATION) - Swab, Nasopharynx.  Procedure                               Abnormality         Status                     ---------                               -----------         ------                     COVID-19,BIOTAP, NP/OP S...[[id number]]                      Final result                 Please view results for these tests on the individual orders.   COVID-19,BIOTAP, NP/OP SWAB IN TRANSPORT MEDIA OR SALINE 24-36 HR TAT    Narrative:     Testing performed at:  [facility]  [address] [place] [address]   ETHANOL   RAINBOW DRAW    Narrative:     The following orders were created for panel order [place] Draw.  Procedure                               Abnormality         Status                     ---------                               -----------         ------                     Light Blue Top[[id number]]                                   Final result               Gold Top - [facility][[id number]]                                   Final result                 Please view results for these tests on the individual orders.   POCT GLUCOSE FINGERSTICK   CBC AND DIFFERENTIAL    Narrative:     The following orders were created for panel order CBC & Differential.  Procedure                               Abnormality         Status                     ---------                               -----------         ------                     CBC Auto Differential[[id number]]        Abnormal            Final result                  Please view results for these tests on the individual orders.   LIGHT BLUE TOP   GOLD TOP - [facility]       Ordered the above labs and independently reviewed the results.        RADIOLOGY  CT Head Without Contrast   Final Result   1. Extensive small vessel ischemic disease with no evidence of acute   infarction or hemorrhage. Further evaluation could be performed with a   MRI examination of the brain as indicated.    2. Remote cerebellar infarcts are noted. The above information was   called to and discussed with Michelle Herrera. See above.               Radiation dose reduction techniques were utilized, including automated   exposure control and exposure modulation based on body size.       This report was finalized on 8/24/2020 4:56 PM by Dr. Andrey Abdul M.D.          XR Chest 1 View   Final Result   No focal pulmonary consolidation. Cardiomegaly. Tortuous   aorta. Follow-up as clinically indicated.       This report was finalized on 8/24/2020 2:08 PM by Dr. Eliezer Alcala M.D.              I ordered the above noted radiological studies. Reviewed by me and discussed with radiologist.  See dictation for official radiology interpretation.    PROCEDURES  Procedures        MEDICATIONS GIVEN IN ER  Medications   diphenhydrAMINE (BENADRYL) injection 25 mg (25 mg Intravenous Given 8/24/20 1603)   dexamethasone sodium phosphate injection 4 mg (4 mg Intravenous Given 8/24/20 1603)   diazePAM (VALIUM) tablet 5 mg (5 mg Oral Given 8/25/20 1016)   labetalol (NORMODYNE,TRANDATE) injection 10 mg (10 mg Intravenous Given 8/26/20 0151)   iopamidol (ISOVUE-370) 76 % injection 100 mL (95 mL Intravenous Given 8/26/20 1758)   amLODIPine (NORVASC) tablet 5 mg (5 mg Oral Given 8/27/20 1433)             PROGRESS AND CONSULTS    DDX includes but not limited to hypertensive urgency or emergency, acute CVA, metabolic encephalopathy, dementia, anxiety, substance use    ED Course as of Sep 03 1118   Mon Aug 24, 2020   1320 I  discussed the patient with Dr. Freedman who states these symptoms are inconsistent with acute CVA, not a TPA candidate due to lengthy onset (2 weeks), intermittent symptoms, nature of symptoms and low NIH. He recommends CT head and may offer the patient admission for further evaluation.     [KA]   1331 EKG performed at 1313 is sinus rhythm borderline left axis deviation normal CA QRS and QTc no ST elevation home or ischemic T wave changes.  No prior for comparison.    [KA]   1423 I discussed the patient with Dr. Abdul, there area extensive white matter changes, no acute findings.    [KA]   1609 I discussed the patient with Dr. Salinas who agrees to admit to telemetry for observation.    [KA]      ED Course User Index  [KA] Michelle Herrera PA        Reviewed pt's history and workup with Dr. Todd.  After a bedside evaluation; they agree with the plan of care      Patient was placed in face mask in first look. Patient was wearing facemask each time I entered the room and throughout our encounter. I wore protective equipment throughout this patient encounter including a face mask, eye shield and gloves. Hand hygiene was performed before donning protective equipment and after removal when leaving the room.        DIAGNOSIS  Final diagnoses:   Vision disturbance   Hypertension not at goal               Latest Documented Vital Signs:  As of 11:05  BP- 152/80 HR- 60 Temp- 98.3 °F (36.8 °C) (Oral) O2 sat- 100%       Michelle Herrera PA  09/03/20 1071

## 2020-09-08 ENCOUNTER — TELEPHONE (OUTPATIENT)
Dept: FAMILY MEDICINE CLINIC | Facility: CLINIC | Age: 60
End: 2020-09-08

## 2020-09-08 NOTE — TELEPHONE ENCOUNTER
Pt called stating that her BP is still high and her legs are swelling really bad.  Pt states that you referred her to cardio, but she is not sure who it is.  I gave her Dr. Vera's phone number - pt will call and see if she can schedule appt.  Advised pt to go to Urgent Care or see Dr. Parra if she can't get in with cardio.

## 2020-09-08 NOTE — TELEPHONE ENCOUNTER
Called pt to see if she was able to get appt with cardio. Pt states that she has not called yet because she is getting her nails done.  Advised pt of Dr. Parra's notes.  I called Dr. Vera's office - they will see pt on 09/16/20 @ 9:00 a.m.  Pt notified.

## 2020-09-08 NOTE — TELEPHONE ENCOUNTER
Correct, thanks. She needs to get in with Cardio urgently. See if Nayeli can help. Strongly advise patient if BP higher than 180/100 and/or she has symptoms of CP, SOB or headache with vision changes she needs to go to UC/ER.

## 2020-09-13 PROBLEM — I67.4 HYPERTENSIVE ENCEPHALOPATHY: Status: ACTIVE | Noted: 2020-09-13

## 2020-09-16 ENCOUNTER — OFFICE VISIT (OUTPATIENT)
Dept: CARDIOLOGY | Facility: CLINIC | Age: 60
End: 2020-09-16

## 2020-09-16 DIAGNOSIS — I10 ESSENTIAL HYPERTENSION: ICD-10-CM

## 2020-09-16 DIAGNOSIS — I10 UNCONTROLLED HYPERTENSION: ICD-10-CM

## 2020-09-16 DIAGNOSIS — I25.10 CORONARY ARTERY DISEASE INVOLVING NATIVE CORONARY ARTERY OF NATIVE HEART WITHOUT ANGINA PECTORIS: Primary | ICD-10-CM

## 2020-09-16 PROCEDURE — 99244 OFF/OP CNSLTJ NEW/EST MOD 40: CPT | Performed by: INTERNAL MEDICINE

## 2020-09-16 RX ORDER — LOSARTAN POTASSIUM 50 MG/1
100 TABLET ORAL
Qty: 30 TABLET | Refills: 0
Start: 2020-09-16 | End: 2020-09-30 | Stop reason: SDUPTHER

## 2020-09-16 RX ORDER — METOPROLOL SUCCINATE 200 MG/1
100 TABLET, EXTENDED RELEASE ORAL DAILY
Qty: 30 TABLET | Refills: 1
Start: 2020-09-16 | End: 2020-09-30 | Stop reason: SDUPTHER

## 2020-09-16 RX ORDER — SPIRONOLACTONE 25 MG/1
25 TABLET ORAL DAILY
Qty: 30 TABLET | Refills: 11 | Status: SHIPPED | OUTPATIENT
Start: 2020-09-16 | End: 2021-05-25

## 2020-09-17 NOTE — PROGRESS NOTES
Subjective:     Encounter Date:09/16/2020      Patient ID: Henny James is a 60 y.o. female.  Thank you for referring this patient for consultation of uncontrolled hypertension.  Chief Complaint:  Hypertension  This is a new problem. The problem is resistant. Associated symptoms include blurred vision, headaches, malaise/fatigue and shortness of breath. Pertinent negatives include no chest pain.   Coronary Artery Disease  Presents for follow-up visit. Symptoms include dizziness, leg swelling and shortness of breath. Pertinent negatives include no chest pain.   Atrial Fibrillation  Presents for follow-up visit. Symptoms include dizziness and shortness of breath. Symptoms are negative for chest pain. The symptoms have been stable. Past medical history includes atrial fibrillation and CAD.     60-year-old -American female incredibly pleasant lady who presents today for evaluation.  Patient has a history of hypertension that has been very difficult to control.  Patient has a history of coronary artery disease as well as a prior myocardial infarction.  Patient had atrial fibrillation listed in her chart as an issue but I can ever really find any documentation on that.  Patient also has a history of cocaine abuse in the distant past.  Overall she is actually been doing better she is trying to get things back on track but her blood pressure has been fluctuating enormous amount.  She is set up for a sleep study tomorrow.  She presents today for management of her blood pressure.    Review of Systems   Constitution: Positive for malaise/fatigue.   Eyes: Positive for blurred vision.   Cardiovascular: Positive for leg swelling. Negative for chest pain.   Respiratory: Positive for shortness of breath.    Musculoskeletal: Positive for joint pain, joint swelling and myalgias.   Neurological: Positive for dizziness and headaches.   All other systems reviewed and are negative.      Procedures       Objective:     Vitals  signs reviewed.   Constitutional:       Appearance: Well-developed.   Eyes:      Conjunctiva/sclera: Conjunctivae normal.   HENT:      Head: Normocephalic.   Neck:      Musculoskeletal: Normal range of motion.   Pulmonary:      Breath sounds: Normal breath sounds.   Cardiovascular:      Normal rate. Regular rhythm.   Edema:     Peripheral edema absent.   Abdominal:      General: Bowel sounds are normal.      Palpations: Abdomen is soft.   Musculoskeletal: Normal range of motion.   Skin:     General: Skin is warm and dry.   Neurological:      Mental Status: Alert and oriented to person, place, and time.   Psychiatric:         Behavior: Behavior normal.         Lab Review:       Assessment:          Diagnosis Plan   1. Essential hypertension  metoprolol succinate XL (TOPROL-XL) 200 MG 24 hr tablet    Basic Metabolic Panel          Plan:         1.  Hypertension.  Patient's blood pressure actually is pretty good today but continues to fluctuate enormous amount.  Patient is on a very high dose of Toprol which I think is causing some significant side effects.  One issue is definitely weight gain she is trying to lose weight but despite that continues to gain weight.  In light of that I change her medications around quite a bit.  I half her Toprol I placed her on Spironolactone told to get a BMP in 1 week.  She was on hydrochlorothiazide before but had severe hypokalemia and this is why it was discontinued recently in the hospital.  Also told her to double her Cozaar.  I want her follow-up with my nurse practitioner in 2 weeks myself in 4 weeks.  Her trust and top of his blood pressure is the best we can.  2.  History of coronary artery disease.  Patient also has reportedly had a myocardial infarction.  I do not see signs of a heart catheterization or stress test have been normal with no perfusion abnormalities.Her last stress test was on 8/15/2019 that was normal.  3.  Mild to moderate aortic insufficiency as well as  mild mitral regurgitation.  Again these appear to be stable based on echo was done at [facility].  Her last echo again was on 8/15/2019.  4.  This very her blood pressure medications and see if we can get her feeling better and get her blood pressure under control.

## 2020-09-30 ENCOUNTER — OFFICE VISIT (OUTPATIENT)
Dept: CARDIOLOGY | Facility: CLINIC | Age: 60
End: 2020-09-30

## 2020-09-30 ENCOUNTER — HOSPITAL ENCOUNTER (OUTPATIENT)
Dept: CARDIOLOGY | Facility: HOSPITAL | Age: 60
Discharge: HOME | End: 2020-09-30
Admitting: NURSE PRACTITIONER

## 2020-09-30 VITALS
OXYGEN SATURATION: 100 % | HEIGHT: 65 IN | HEART RATE: 54 BPM | BODY MASS INDEX: 37.82 KG/M2 | WEIGHT: 227 LBS | DIASTOLIC BLOOD PRESSURE: 100 MMHG | SYSTOLIC BLOOD PRESSURE: 190 MMHG

## 2020-09-30 DIAGNOSIS — I10 ESSENTIAL HYPERTENSION: ICD-10-CM

## 2020-09-30 DIAGNOSIS — E78.5 HYPERLIPIDEMIA, UNSPECIFIED HYPERLIPIDEMIA TYPE: Primary | ICD-10-CM

## 2020-09-30 DIAGNOSIS — I16.0 HYPERTENSIVE URGENCY: Primary | ICD-10-CM

## 2020-09-30 PROCEDURE — 25010000002 HYDRALAZINE PER 20 MG: Performed by: NURSE PRACTITIONER

## 2020-09-30 PROCEDURE — 99215 OFFICE O/P EST HI 40 MIN: CPT | Performed by: NURSE PRACTITIONER

## 2020-09-30 PROCEDURE — 96374 THER/PROPH/DIAG INJ IV PUSH: CPT

## 2020-09-30 RX ORDER — LOSARTAN POTASSIUM 100 MG/1
100 TABLET ORAL
Qty: 90 TABLET | Refills: 3
Start: 2020-09-30 | End: 2020-10-01 | Stop reason: SDUPTHER

## 2020-09-30 RX ORDER — HYDRALAZINE HYDROCHLORIDE 20 MG/ML
20 INJECTION INTRAMUSCULAR; INTRAVENOUS ONCE
Status: COMPLETED | OUTPATIENT
Start: 2020-09-30 | End: 2020-09-30

## 2020-09-30 RX ORDER — TRAZODONE HYDROCHLORIDE 50 MG/1
TABLET ORAL AS NEEDED
COMMUNITY
Start: 2020-09-02 | End: 2021-05-25 | Stop reason: ALTCHOICE

## 2020-09-30 RX ORDER — METOPROLOL SUCCINATE 100 MG/1
100 TABLET, EXTENDED RELEASE ORAL DAILY
Qty: 90 TABLET | Refills: 3
Start: 2020-09-30 | End: 2020-10-01 | Stop reason: SDUPTHER

## 2020-09-30 RX ORDER — HYDRALAZINE HYDROCHLORIDE 20 MG/ML
10 INJECTION INTRAMUSCULAR; INTRAVENOUS ONCE
Status: DISCONTINUED | OUTPATIENT
Start: 2020-09-30 | End: 2020-09-30

## 2020-09-30 RX ADMIN — HYDRALAZINE HYDROCHLORIDE 20 MG: 20 INJECTION, SOLUTION INTRAMUSCULAR; INTRAVENOUS at 12:53

## 2020-09-30 NOTE — PROGRESS NOTES
[facility] CARDIOLOGY  [address]  [facility] [address]  Phone: [phone]      Patient Name: Henny James  :1960  Age: 60 y.o.  Primary Cardiologist: Jorge Alberto Rodriguez MD  Encounter Provider:  ANASTASIIA Salcido      Chief Complaint:   Chief Complaint   Patient presents with   • Coronary Artery Disease     2 wk f/u         HPI  Henny James is a 60 y.o. female who presents today for reevaluation of uncontrolled hypertension.     Pt has a  history significant for CAD, hyperlipidemia, hypertensive urgency, palpitations, questionable TIA.    Review of medical record reveals that patient had a recent hospitalization for hypertensive urgency were medications were adjusted at that time.  Since that visit patient was evaluated by Dr. Rodriguez where her blood pressure remains uncontrolled.  She was advised to decrease her metoprolol succinate from 200 mg/day to 100 mg/day and add spironolactone 25 mg/day.    Patient reports today stating that her blood pressure has been more out of control than normal.  Review of her medications in the office reveals that she has been taking amlodipine 10 mg/day, losartan 100 mg/day, metoprolol succinate 100 mg/day.  However, patient did not realize about the addition of spironolactone 25 mg/day and has not taken this medication.  She also notes that during hospitalization she was recommended to start Crestor.  She reports that she has a history of myalgias with Lipitor and has not yet started this medication as she is fearful that she will have the same side effects.  She also notes that while her blood pressure regimen has been adjusted she does not want to initiate any medication at this time.  Patient reports that she does have episodes of headache as well as generalized fatigue when her blood pressure is elevated.  She reports that she has also noted some very brief episodes where she feels like her heart is beating  "quickly.  She denies any episodes of chest pain or lightheadedness.      The following portions of the patient's history were reviewed and updated as appropriate: allergies, current medications, past family history, past medical history, past social history, past surgical history and problem list.      Review of Systems   Constitution: Positive for malaise/fatigue.   Cardiovascular: Negative for chest pain and leg swelling.   Respiratory: Negative for shortness of breath.    Neurological: Positive for headaches. Negative for light-headedness.   All other systems reviewed and are negative.      OBJECTIVE:     Vitals:    09/30/20 1112 09/30/20 1257   BP: (!) 186/124 (!) 190/100   BP Location: Left arm    Patient Position: Sitting    Cuff Size: Adult    Pulse: 54    SpO2: 100%    Weight: 103 kg (227 lb)    Height: 165.1 cm (65\")      Body mass index is 37.77 kg/m².    Vitals signs and nursing note reviewed.   Constitutional:       Appearance: Normal appearance. Well-developed.   Eyes:      Conjunctiva/sclera: Conjunctivae normal.   Neck:      Vascular: No carotid bruit.   Pulmonary:      Breath sounds: Normal breath sounds.   Cardiovascular:      Normal rate. Regular rhythm. Normal S1 with normal intensity. Normal S2 with normal intensity.      Murmurs: There is no murmur.      No gallop. No click. No rub.   Musculoskeletal: Normal range of motion.   Skin:     General: Skin is warm and dry.   Neurological:      Mental Status: Alert and oriented to person, place, and time.      GCS: GCS eye subscore is 4. GCS verbal subscore is 5. GCS motor subscore is 6.   Psychiatric:         Speech: Speech normal.         Behavior: Behavior normal.         Thought Content: Thought content normal.         Judgment: Judgment normal.         Procedures    Cardiac Procedures:  1. Myocardial perfusion study at [facility] 8/15/2019.  Normal myocardial perfusion study.  No evidence of infarct or ischemia.  2. Echocardiogram at [facility] 8/15/2019.  LVEF 55-60%.  Mild LVH.  Mildly dilated LA.  Mildly enlarged RV cavity.  Mild to moderate aortic valve regurgitation.  Mild mitral valve regurgitation.        ASSESSMENT:      Diagnosis Plan   1. Hyperlipidemia, unspecified hyperlipidemia type     2. Essential hypertension  metoprolol succinate XL (TOPROL-XL) 100 MG 24 hr tablet         PLAN OF CARE:     1. Hypertensive urgency.  Patient's blood pressure is markedly elevated today in office.  BP by /124, repeat BP by me 190/100.  Patient is feeling symptomatic with some headaches.  Review of medications has revealed that patient did not realize that she was possibly taking spironolactone 25 mg/day.  She reports that she thinks she did pick this up from the pharmacy but thought that it was a statin drug and wanted to wait to start that medication until her blood pressure was controlled.  She was extensively educated on the role of spironolactone for her blood pressure.  She states that she will start this medication today.  I did send the patient to the [facility] today to receive IV hydralazine, however they were unable to obtain an IV line.  She did receive hydralazine 20 mg IM.  She will continue with her amlodipine 10 mg/day, losartan 100 mg/day, metoprolol succinate 100 mg/day.  She will go for a repeat BMP, that has already been ordered by Dr. Rodriguez, in 2 weeks.  2. Hyperlipidemia.  Reviewed lipid panel dated 8/25/2020 which reveals total cholesterol 263, triglycerides 71, HDL 60, .  Patient has tried atorvastatin in the past and suffered myalgias.  She was prescribed Crestor during hospitalization, however she has not started this medication yet as she is fearful that it will cause myalgias and she is also trying to wait until her blood pressure regimen is more controlled so that she will know the effects of Crestor.  I recommended that once we get her blood pressure regimen more stable that I would like for her to try the  Crestor.  Hopefully she will be able to tolerate a statin drug as her LDL definitely needs to be below 100.  3. Keep your previously scheduled appointment with Dr. Rodriguez in October 2020.    Face to face counseling was given to patient for the following topics: instructions for management, risk factor reductions, prognosis, patient and family education, impressions, risks and benefits of treatment options and importance of treatment compliance . Total time of the encounter was 48 minutes and at least 50% of that time was spent counseling.             Discharge Medications          Accurate as of September 30, 2020  1:02 PM. If you have any questions, ask your nurse or doctor.            Changes to Medications      Instructions Start Date   losartan 100 MG tablet  Commonly known as: COZAAR  What changed: medication strength  Changed by: Kristi Powell, APRN   100 mg, Oral, Every 24 Hours Scheduled      metoprolol succinate  MG 24 hr tablet  Commonly known as: TOPROL-XL  What changed: medication strength  Changed by: Kristi Powell, APRN   100 mg, Oral, Daily         Continue These Medications      Instructions Start Date   amLODIPine 10 MG tablet  Commonly known as: NORVASC   10 mg, Oral, Daily      ARIPiprazole 10 MG tablet  Commonly known as: ABILIFY   10 mg, Oral, Nightly      aspirin 81 MG chewable tablet   81 mg, Oral, Daily      B-12 PO   Oral      B-6 PO   Oral      lamoTRIgine 200 MG tablet  Commonly known as: LaMICtal   200 mg, Oral, Nightly      naproxen 500 MG tablet  Commonly known as: NAPROSYN   500 mg, Oral, 2 Times Daily PRN      spironolactone 25 MG tablet  Commonly known as: ALDACTONE   25 mg, Oral, Daily      traZODone 50 MG tablet  Commonly known as: DESYREL   As Needed      VITAMIN E PO   Oral      Xanax 1 MG tablet  Generic drug: ALPRAZolam   1 mg, Oral, 3 Times Daily PRN         Stop These Medications    rosuvastatin 40 MG tablet  Commonly known as: CRESTOR  Stopped by: Kristi PEREZ  ANASTASIIA Powell            Thank you for allowing me to participate in the care of your patient,         ANASTASIIA Salcido  [facility]  09/30/20  11:50 EDT      **Nino Disclaimer:**  Much of this encounter note is an electronic transcription/translation of spoken language to printed text. The electronic translation of spoken language may permit erroneous, or at times, nonsensical words or phrases to be inadvertently transcribed. Although I have reviewed the note for such errors, some may still exist.

## 2020-10-01 DIAGNOSIS — I10 ESSENTIAL HYPERTENSION: ICD-10-CM

## 2020-10-02 RX ORDER — LOSARTAN POTASSIUM 100 MG/1
100 TABLET ORAL
Qty: 90 TABLET | Refills: 1 | OUTPATIENT
Start: 2020-10-02 | End: 2021-05-05

## 2020-10-02 RX ORDER — METOPROLOL SUCCINATE 100 MG/1
100 TABLET, EXTENDED RELEASE ORAL DAILY
Qty: 90 TABLET | Refills: 1 | OUTPATIENT
Start: 2020-10-02 | End: 2021-07-28

## 2020-11-06 ENCOUNTER — OFFICE VISIT (OUTPATIENT)
Dept: FAMILY MEDICINE CLINIC | Facility: CLINIC | Age: 60
End: 2020-11-06

## 2020-11-06 VITALS — WEIGHT: 230 LBS | HEIGHT: 65 IN | BODY MASS INDEX: 38.32 KG/M2

## 2020-11-06 DIAGNOSIS — U07.1 COVID-19 VIRUS DETECTED: Primary | ICD-10-CM

## 2020-11-06 PROCEDURE — 99213 OFFICE O/P EST LOW 20 MIN: CPT | Performed by: FAMILY MEDICINE

## 2020-11-06 NOTE — PROGRESS NOTES
Subjective   Henny James is a 60 y.o. female.     History of Present Illness     60-year-old female presents today via video visit to discuss positive COVID-19 test.    Patient went to [place] last Saturday to Sunday to visit her sister.  Came back on Sunday.  Went to work on Monday.  Tuesday she started to feel like she had no sense of taste or smell and decreased energy.  She went to [facility] with her  to be tested; her  was negative she came back positive for COVID-19.  Since then has had some diarrhea.  Denies shortness of breath or fever.  Denies cough.  Reports headaches and postnasal drip.  Continues to endorse no taste or smell and low energy.  Is taking her multivitamins B12 and vitamin C.    PHQ-2/PHQ-9 Depression Screening 11/6/2020   Little interest or pleasure in doing things 0   Feeling down, depressed, or hopeless 0   Trouble falling or staying asleep, or sleeping too much -   Feeling tired or having little energy -   Feeling bad about yourself - or that you are a failure or have let yourself or your family down -   Trouble concentrating on things, such as reading the newspaper or watching television -   Moving or speaking so slowly that other people could have noticed. Or the opposite - being so fidgety or restless that you have been moving around a lot more than usual -   Thoughts that you would be better off dead, or of hurting yourself in some way -   Total Score 0   If you checked off any problems, how difficult have these problems made it for you to do your work, take care of things at home, or get along with other people? -       The following portions of the patient's history were reviewed and updated as appropriate: allergies, current medications, past family history, past medical history, past social history, past surgical history and problem list.    Review of Systems   Constitutional: Positive for fatigue. Negative for chills and fever.   HENT: Positive for postnasal  drip. Negative for congestion.    Respiratory: Negative for cough and shortness of breath.    Cardiovascular: Negative for chest pain, palpitations and leg swelling.   Gastrointestinal: Positive for diarrhea. Negative for abdominal pain and vomiting.       Objective   Physical Exam  Constitutional:       General: She is not in acute distress.     Appearance: She is well-developed.   HENT:      Head: Normocephalic and atraumatic.   Pulmonary:      Effort: Pulmonary effort is normal. No respiratory distress.   Neurological:      Mental Status: She is alert and oriented to person, place, and time.   Psychiatric:         Behavior: Behavior normal.                 Assessment/Plan     Diagnoses and all orders for this visit:    1. COVID-19 virus detected (Primary)  -     QUESTIONNAIRE SERIES      COVID infection.  Due to recent positive test, patient does have a COVID 19 infection.  Signs and symptoms discussed with patient.  Patient educated to self isolate in her room away from others they live with.  Mask available.  Patient advised not to leave house for any reason.    Self treatment discussed including Tylenol for fever, pain or myalgias; cough cold medications for symptoms.  Patient to check temperature daily.  Monitor for symptoms of respiratory distress.  To check in daily with her office via my chart or phone with symptoms.    Nature disease to cause severe respiratory distress day 8 or 9 discussed.  If needs emergent care to notify EMS or ED or our office that they may have coded to allow for proper PPE and isolation.    We will check in with her cardiologist to discuss considering any further blood thinners besides the aspirin that she is taking given her history of TIA and blood clots and concern for blood clots with COVID-19 infection.    Time spent during visit: 15 minutes.

## 2020-11-13 ENCOUNTER — OFFICE VISIT (OUTPATIENT)
Dept: CARDIOLOGY | Facility: CLINIC | Age: 60
End: 2020-11-13

## 2020-11-13 VITALS
HEART RATE: 63 BPM | HEIGHT: 65 IN | DIASTOLIC BLOOD PRESSURE: 84 MMHG | WEIGHT: 230 LBS | BODY MASS INDEX: 38.32 KG/M2 | SYSTOLIC BLOOD PRESSURE: 189 MMHG

## 2020-11-13 DIAGNOSIS — I10 UNCONTROLLED HYPERTENSION: ICD-10-CM

## 2020-11-13 DIAGNOSIS — I25.10 CORONARY ARTERY DISEASE INVOLVING NATIVE CORONARY ARTERY OF NATIVE HEART WITHOUT ANGINA PECTORIS: Primary | ICD-10-CM

## 2020-11-13 PROCEDURE — 99441 PR PHYS/QHP TELEPHONE EVALUATION 5-10 MIN: CPT | Performed by: INTERNAL MEDICINE

## 2020-11-18 ENCOUNTER — TELEPHONE (OUTPATIENT)
Dept: CARDIOLOGY | Facility: CLINIC | Age: 60
End: 2020-11-18

## 2020-11-18 NOTE — TELEPHONE ENCOUNTER
Dr. Rodriguez,    Ms [place] called to report that her BP continues to run 200/80-90 since her last visit with you on 11/13/20.    She has been having a headache, left arm soreness with touch, especially in the elbow area. She also states that yesterday while driving, her vision became blurry for a short time.    She is very concerned and states that she has been taking all meds as ordered. We did talk about what you said in your note about giving some time post covid to see if her pressure decreases.  She states that she understands but feels like this is running too high.          Thank you,  Maureen Winston RN  [facility]  Triage

## 2020-11-19 RX ORDER — HYDRALAZINE HYDROCHLORIDE 25 MG/1
25 TABLET, FILM COATED ORAL 3 TIMES DAILY
Qty: 90 TABLET | Refills: 3 | Status: SHIPPED | OUTPATIENT
Start: 2020-11-19 | End: 2021-03-15 | Stop reason: SDUPTHER

## 2020-11-19 NOTE — TELEPHONE ENCOUNTER
I sent her in some hydralazine 25 3 times daily to use if her blood pressure still running elevated like it is.  Hopefully her blood pressure will improve as her symptoms improve with Covid and she should be able to stop this in the future

## 2020-11-19 NOTE — TELEPHONE ENCOUNTER
Patient notified and detailed instructions given. She verbalized understanding.    Thank you,  Maureen Winston RN  [facility]  Triage

## 2020-11-20 ENCOUNTER — HOSPITAL ENCOUNTER (EMERGENCY)
Facility: HOSPITAL | Age: 60
Discharge: HOME | End: 2020-11-20
Attending: EMERGENCY MEDICINE | Admitting: EMERGENCY MEDICINE

## 2020-11-20 VITALS
HEART RATE: 69 BPM | TEMPERATURE: 96.3 F | HEIGHT: 65 IN | OXYGEN SATURATION: 99 % | BODY MASS INDEX: 39.32 KG/M2 | SYSTOLIC BLOOD PRESSURE: 170 MMHG | DIASTOLIC BLOOD PRESSURE: 84 MMHG | WEIGHT: 236 LBS | RESPIRATION RATE: 16 BRPM

## 2020-11-20 DIAGNOSIS — I10 UNCONTROLLED HYPERTENSION: Primary | ICD-10-CM

## 2020-11-20 LAB
ALBUMIN SERPL-MCNC: 5.1 G/DL (ref 3.5–5.2)
ALBUMIN/GLOB SERPL: 1.3 G/DL
ALP SERPL-CCNC: 113 U/L (ref 39–117)
ALT SERPL W P-5'-P-CCNC: 26 U/L (ref 1–33)
ANION GAP SERPL CALCULATED.3IONS-SCNC: 9.8 MMOL/L (ref 5–15)
AST SERPL-CCNC: 20 U/L (ref 1–32)
BASOPHILS # BLD AUTO: 0.06 10*3/MM3 (ref 0–0.2)
BASOPHILS NFR BLD AUTO: 0.6 % (ref 0–1.5)
BILIRUB SERPL-MCNC: 0.3 MG/DL (ref 0–1.2)
BUN SERPL-MCNC: 13 MG/DL (ref 6–20)
BUN/CREAT SERPL: 28.3 (ref 7–25)
CALCIUM SPEC-SCNC: 9.5 MG/DL (ref 8.6–10.5)
CHLORIDE SERPL-SCNC: 103 MMOL/L (ref 98–107)
CO2 SERPL-SCNC: 27.2 MMOL/L (ref 22–29)
CREAT SERPL-MCNC: 0.46 MG/DL (ref 0.57–1)
DEPRECATED RDW RBC AUTO: 45.9 FL (ref 37–54)
EOSINOPHIL # BLD AUTO: 0.08 10*3/MM3 (ref 0–0.4)
EOSINOPHIL NFR BLD AUTO: 0.9 % (ref 0.3–6.2)
ERYTHROCYTE [DISTWIDTH] IN BLOOD BY AUTOMATED COUNT: 14.8 % (ref 12.3–15.4)
GFR SERPL CREATININE-BSD FRML MDRD: >150 ML/MIN/1.73
GLOBULIN UR ELPH-MCNC: 4 GM/DL
GLUCOSE SERPL-MCNC: 97 MG/DL (ref 65–99)
HCT VFR BLD AUTO: 39.7 % (ref 34–46.6)
HGB BLD-MCNC: 13.4 G/DL (ref 12–15.9)
HOLD SPECIMEN: NORMAL
HOLD SPECIMEN: NORMAL
IMM GRANULOCYTES # BLD AUTO: 0.03 10*3/MM3 (ref 0–0.05)
IMM GRANULOCYTES NFR BLD AUTO: 0.3 % (ref 0–0.5)
LYMPHOCYTES # BLD AUTO: 4.06 10*3/MM3 (ref 0.7–3.1)
LYMPHOCYTES NFR BLD AUTO: 43.5 % (ref 19.6–45.3)
MAGNESIUM SERPL-MCNC: 2 MG/DL (ref 1.6–2.6)
MCH RBC QN AUTO: 28.8 PG (ref 26.6–33)
MCHC RBC AUTO-ENTMCNC: 33.8 G/DL (ref 31.5–35.7)
MCV RBC AUTO: 85.4 FL (ref 79–97)
MONOCYTES # BLD AUTO: 0.82 10*3/MM3 (ref 0.1–0.9)
MONOCYTES NFR BLD AUTO: 8.8 % (ref 5–12)
NEUTROPHILS NFR BLD AUTO: 4.29 10*3/MM3 (ref 1.7–7)
NEUTROPHILS NFR BLD AUTO: 45.9 % (ref 42.7–76)
NRBC BLD AUTO-RTO: 0.1 /100 WBC (ref 0–0.2)
NT-PROBNP SERPL-MCNC: 114.7 PG/ML (ref 0–900)
PLATELET # BLD AUTO: 507 10*3/MM3 (ref 140–450)
PMV BLD AUTO: 10.6 FL (ref 6–12)
POTASSIUM SERPL-SCNC: 3.4 MMOL/L (ref 3.5–5.2)
PROT SERPL-MCNC: 9.1 G/DL (ref 6–8.5)
RBC # BLD AUTO: 4.65 10*6/MM3 (ref 3.77–5.28)
SODIUM SERPL-SCNC: 140 MMOL/L (ref 136–145)
TROPONIN T SERPL-MCNC: <0.01 NG/ML (ref 0–0.03)
TSH SERPL DL<=0.05 MIU/L-ACNC: 4.08 UIU/ML (ref 0.27–4.2)
WBC # BLD AUTO: 9.34 10*3/MM3 (ref 3.4–10.8)
WHOLE BLOOD HOLD SPECIMEN: NORMAL
WHOLE BLOOD HOLD SPECIMEN: NORMAL

## 2020-11-20 PROCEDURE — 84443 ASSAY THYROID STIM HORMONE: CPT | Performed by: EMERGENCY MEDICINE

## 2020-11-20 PROCEDURE — 99283 EMERGENCY DEPT VISIT LOW MDM: CPT

## 2020-11-20 PROCEDURE — 85025 COMPLETE CBC W/AUTO DIFF WBC: CPT | Performed by: EMERGENCY MEDICINE

## 2020-11-20 PROCEDURE — 93010 ELECTROCARDIOGRAM REPORT: CPT | Performed by: INTERNAL MEDICINE

## 2020-11-20 PROCEDURE — 80053 COMPREHEN METABOLIC PANEL: CPT | Performed by: EMERGENCY MEDICINE

## 2020-11-20 PROCEDURE — 83880 ASSAY OF NATRIURETIC PEPTIDE: CPT | Performed by: EMERGENCY MEDICINE

## 2020-11-20 PROCEDURE — 36415 COLL VENOUS BLD VENIPUNCTURE: CPT

## 2020-11-20 PROCEDURE — 25010000002 HYDRALAZINE PER 20 MG: Performed by: EMERGENCY MEDICINE

## 2020-11-20 PROCEDURE — 93005 ELECTROCARDIOGRAM TRACING: CPT | Performed by: EMERGENCY MEDICINE

## 2020-11-20 PROCEDURE — 96374 THER/PROPH/DIAG INJ IV PUSH: CPT

## 2020-11-20 PROCEDURE — 84484 ASSAY OF TROPONIN QUANT: CPT | Performed by: EMERGENCY MEDICINE

## 2020-11-20 PROCEDURE — 83735 ASSAY OF MAGNESIUM: CPT | Performed by: EMERGENCY MEDICINE

## 2020-11-20 RX ORDER — SODIUM CHLORIDE 0.9 % (FLUSH) 0.9 %
10 SYRINGE (ML) INJECTION AS NEEDED
Status: DISCONTINUED | OUTPATIENT
Start: 2020-11-20 | End: 2020-11-20 | Stop reason: HOSPADM

## 2020-11-20 RX ORDER — HYDRALAZINE HYDROCHLORIDE 20 MG/ML
10 INJECTION INTRAMUSCULAR; INTRAVENOUS ONCE
Status: COMPLETED | OUTPATIENT
Start: 2020-11-20 | End: 2020-11-20

## 2020-11-20 RX ORDER — ASPIRIN 325 MG
325 TABLET ORAL ONCE
Status: DISCONTINUED | OUTPATIENT
Start: 2020-11-20 | End: 2020-11-20

## 2020-11-20 RX ADMIN — HYDRALAZINE HYDROCHLORIDE 10 MG: 20 INJECTION INTRAMUSCULAR; INTRAVENOUS at 19:00

## 2020-11-20 NOTE — ED NOTES
Patient to er from home with c/o she tested positive with covid on 11/03/2020. Patient stated she is still having chest pain/ headache and hypertension. Patient has mask on in triage along with staff. Patient reported past TIA's in the past.      Torsten Martinez RN  11/20/20 [id number]

## 2020-11-20 NOTE — ED PROVIDER NOTES
" EMERGENCY DEPARTMENT ENCOUNTER    Room Number:  14/14  Date of encounter:  11/20/2020  PCP: Cheng Parra MD  Historian: Pt    Patient was placed in face mask during triage process. Patient was wearing facemask when I entered the room and throughout our encounter. I wore full protective equipment throughout this patient encounter including a face mask, eye protection, and gloves. Hand hygiene was performed before donning protective equipment and again following doffing of PPE after leaving the room.    HPI:  Chief Complaint: Elevated blood pressure  A complete HPI/ROS/PMH/PSH/SH/FH are unobtainable due to: N/A   Context: Henny James is a 60 y.o. female previously positive for Covid 19 on 11/3/2020 who has history of uncontrolled hypertension, coronary artery disease, bipolar, and remote history of cocaine abuse presents to the ED c/o elevated blood pressure for at least 2 to 3 days.  She reports that she continues to have headaches, myalgias, and generalized fatigue since being diagnosed with COVID-19 over 2 weeks ago.  Symptoms not resolving but also not worse today.  Patient had some \"floaters\" in her vision yesterday but has had none today.  No other vision change, nausea, vomiting, cough or fever reported in the last 48 hours.  Specifically the patient denies chest pain or shortness of breath to me.  Patient notes her systolic blood pressure was in excess of 200 yesterday so she called her primary care physician who called in and started her on 25 mg of HCTZ daily.  She is had no improvement in her blood pressure today so she decided to present to the emergency department.  No clear exacerbating relieving factors.  No active pain at this time other than her mild global headache.  Patient reports that she has taken 5 blood pressure medications today.    MEDICAL HISTORY REVIEW  Date of Discharge:  8/27/2020  PCP: Cheng Parra MD  Discharge Diagnosis:         Active Hospital Problems     Diagnosis   POA   • " Hypertensive encephalopathy [I67.4]   Unknown   • Prediabetes [R73.03]   Yes   • Hypokalemia [E87.6]   Yes   • Bipolar 1 disorder (CMS/[facility]) [F31.9]   Yes   • CAD (coronary artery disease), native coronary artery [I25.10]   Yes   • HLD (hyperlipidemia) [E78.5]   Yes   • Atrial fibrillation (CMS/[facility]) [I48.91]   Yes   • Hypertensive urgency [I16.0]   Yes   • Uncontrolled hypertension [I10]           PAST MEDICAL HISTORY  Active Ambulatory Problems     Diagnosis Date Noted   • Abnormal urine finding 10/23/2018   • Anemia 08/09/2013   • Atrial fibrillation (CMS/[facility]) 05/23/2018   • CAD (coronary artery disease), native coronary artery 12/13/2019   • Chest pain syndrome 06/04/2015   • Chronic left hip pain 10/23/2018   • Dizziness 10/23/2018   • Fatigue 10/23/2018   • HLD (hyperlipidemia) 12/13/2019   • Hypertensive urgency 04/17/2018   • Intermittent claudication (CMS/HCC) 10/23/2013   • Long term (current) use of anticoagulants 08/09/2013   • Lower abdominal pain 02/05/2019   • Neoplasm of uncertain behavior of connective and other soft tissue 08/09/2013   • Palpitations 10/18/2019   • Personal history of other specified conditions presenting hazards to health 08/09/2013   • Personal history of tobacco use, presenting hazards to health 08/09/2013   • Thrombocytosis (CMS/[facility]) 07/24/2015   • Transient ischemic attack (TIA), and cerebral infarction without residual deficits(V12.54) 08/09/2013   • Uncontrolled hypertension 08/09/2013   • Hypokalemia 08/24/2020   • Bipolar 1 disorder (CMS/[facility]) 08/24/2020   • Prediabetes 08/25/2020   • Psychophysiological insomnia 08/31/2020   • Hypertensive encephalopathy 09/13/2020     Resolved Ambulatory Problems     Diagnosis Date Noted   • Stroke-like symptoms 04/17/2018   • Vision disturbance 08/24/2020     Past Medical History:   Diagnosis Date   • Anxiety    • Arthritis    • CAD (coronary artery disease)    • Depression    • Heart palpitations    • History of cocaine abuse (CMS/HCC)   "  • History of gastric cancer 10/2011   • History of gastrointestinal bleeding    • History of kidney surgery    • History of myocardial infarction    • History of suicide attempt    • History of thrombocytosis 2015   • Hypercholesteremia    • Hypersomnia    • Hypertension    • Obesity (BMI 30-39.9)    • Stroke (CMS/HCC)    • Thrombus of left atrial appendage 10/2011   • TIA (transient ischemic attack)          PAST SURGICAL HISTORY  Past Surgical History:   Procedure Laterality Date   • CARDIAC CATHETERIZATION     •  SECTION     • ENDOMETRIAL BIOPSY N/A 01/10/2020    Negative for Atypia, Negative for Malignancy - Mohit Fay   • EXPLORATORY LAPAROTOMY N/A 10/17/2011    w/ Resection of Gastrointestinal Stromal Tumor   • SPLENECTOMY     • TONSILLECTOMY     • TUBAL ABDOMINAL LIGATION           FAMILY HISTORY  Family History   Problem Relation Age of Onset   • Bipolar disorder Daughter    • Hypertension Son    • Alcohol abuse Son          SOCIAL HISTORY  Social History     Socioeconomic History   • Marital status:      Spouse name: Not on file   • Number of children: 5   • Years of education: 13   • Highest education level: Not on file   Occupational History   • Occupation: SECTION MANAGER   Tobacco Use   • Smoking status: Never Smoker   • Smokeless tobacco: Never Used   Substance and Sexual Activity   • Alcohol use: Yes     Comment: SOCIALLY   • Drug use: Not Currently     Types: \"Crack\" cocaine     Comment: STOPPED 8 YEARS AGO   • Sexual activity: Defer   Social History Narrative    DOES NOT LIVE ALONE         ALLERGIES  Patient has no known allergies.        REVIEW OF SYSTEMS  Review of Systems     All systems reviewed and negative except for those discussed in HPI.       PHYSICAL EXAM    I have reviewed the triage vital signs and nursing notes.    ED Triage Vitals [20 1829]   Temp Heart Rate Resp BP SpO2   98.9 °F (37.2 °C) 69 -- (!) 219/113 98 %      Temp src Heart " Rate Source Patient Position BP Location FiO2 (%)   Tympanic -- Sitting -- --       Physical Exam    Physical Exam   Constitutional: No distress but somewhat anxious appearing.  Not overtly toxic  HENT:  Head: Normocephalic and atraumatic.   Oropharynx: Mucous membranes are moist.   Eyes: No scleral icterus. No conjunctival pallor.  Neck: Painless range of motion noted. Neck supple.   Cardiovascular: Normal rate, regular rhythm and intact distal pulses.  Pulmonary/Chest: No respiratory distress. There are no wheezes, no rhonchi, and no rales.   Abdominal: Soft. There is no tenderness. There is no rebound and no guarding.   Musculoskeletal: Moves all extremities equally. There is no pedal edema or calf tenderness.   Neurological: Alert.  Baseline strength and sensation noted.  Stable gait and station.  Skin: Skin is pink, warm, and dry. No pallor.   Psychiatric: Slightly anxious otherwise normal affect  Nursing note and vitals reviewed.    LAB RESULTS  Recent Results (from the past 24 hour(s))   ECG 12 Lead    Collection Time: 11/20/20  6:36 PM   Result Value Ref Range    QT Interval 412 ms   Comprehensive Metabolic Panel    Collection Time: 11/20/20  6:50 PM    Specimen: Blood   Result Value Ref Range    Glucose 97 65 - 99 mg/dL    BUN 13 6 - 20 mg/dL    Creatinine 0.46 (L) 0.57 - 1.00 mg/dL    Sodium 140 136 - 145 mmol/L    Potassium 3.4 (L) 3.5 - 5.2 mmol/L    Chloride 103 98 - 107 mmol/L    CO2 27.2 22.0 - 29.0 mmol/L    Calcium 9.5 8.6 - 10.5 mg/dL    Total Protein 9.1 (H) 6.0 - 8.5 g/dL    Albumin 5.10 3.50 - 5.20 g/dL    ALT (SGPT) 26 1 - 33 U/L    AST (SGOT) 20 1 - 32 U/L    Alkaline Phosphatase 113 39 - 117 U/L    Total Bilirubin 0.3 0.0 - 1.2 mg/dL    eGFR  African Amer >150 >60 mL/min/1.73    Globulin 4.0 gm/dL    A/G Ratio 1.3 g/dL    BUN/Creatinine Ratio 28.3 (H) 7.0 - 25.0    Anion Gap 9.8 5.0 - 15.0 mmol/L   Troponin    Collection Time: 11/20/20  6:50 PM    Specimen: Blood   Result Value Ref Range     Troponin T <0.010 0.000 - 0.030 ng/mL   Light Blue Top    Collection Time: 11/20/20  6:50 PM   Result Value Ref Range    Extra Tube hold for add-on    Green Top (Gel)    Collection Time: 11/20/20  6:50 PM   Result Value Ref Range    Extra Tube Hold for add-ons.    Lavender Top    Collection Time: 11/20/20  6:50 PM   Result Value Ref Range    Extra Tube hold for add-on    Gold Top - SST    Collection Time: 11/20/20  6:50 PM   Result Value Ref Range    Extra Tube Hold for add-ons.    CBC Auto Differential    Collection Time: 11/20/20  6:50 PM    Specimen: Blood   Result Value Ref Range    WBC 9.34 3.40 - 10.80 10*3/mm3    RBC 4.65 3.77 - 5.28 10*6/mm3    Hemoglobin 13.4 12.0 - 15.9 g/dL    Hematocrit 39.7 34.0 - 46.6 %    MCV 85.4 79.0 - 97.0 fL    MCH 28.8 26.6 - 33.0 pg    MCHC 33.8 31.5 - 35.7 g/dL    RDW 14.8 12.3 - 15.4 %    RDW-SD 45.9 37.0 - 54.0 fl    MPV 10.6 6.0 - 12.0 fL    Platelets 507 (H) 140 - 450 10*3/mm3    Neutrophil % 45.9 42.7 - 76.0 %    Lymphocyte % 43.5 19.6 - 45.3 %    Monocyte % 8.8 5.0 - 12.0 %    Eosinophil % 0.9 0.3 - 6.2 %    Basophil % 0.6 0.0 - 1.5 %    Immature Grans % 0.3 0.0 - 0.5 %    Neutrophils, Absolute 4.29 1.70 - 7.00 10*3/mm3    Lymphocytes, Absolute 4.06 (H) 0.70 - 3.10 10*3/mm3    Monocytes, Absolute 0.82 0.10 - 0.90 10*3/mm3    Eosinophils, Absolute 0.08 0.00 - 0.40 10*3/mm3    Basophils, Absolute 0.06 0.00 - 0.20 10*3/mm3    Immature Grans, Absolute 0.03 0.00 - 0.05 10*3/mm3    nRBC 0.1 0.0 - 0.2 /100 WBC   BNP    Collection Time: 11/20/20  6:50 PM    Specimen: Blood   Result Value Ref Range    proBNP 114.7 0.0 - 900.0 pg/mL   Magnesium    Collection Time: 11/20/20  6:50 PM    Specimen: Blood   Result Value Ref Range    Magnesium 2.0 1.6 - 2.6 mg/dL   TSH    Collection Time: 11/20/20  6:50 PM    Specimen: Blood   Result Value Ref Range    TSH 4.080 0.270 - 4.200 uIU/mL       Ordered the above labs and independently reviewed the results.        RADIOLOGY  No Radiology Exams  Resulted Within Past 24 Hours    I ordered the above noted radiological studies. Reviewed by me and discussed with radiologist.  See dictation for official radiology interpretation.      PROCEDURES    Procedures        MEDICATIONS GIVEN IN ER    Medications   sodium chloride 0.9 % flush 10 mL (has no administration in time range)   hydrALAZINE (APRESOLINE) injection 10 mg (10 mg Intravenous Given 11/20/20 1900)         PROGRESS, DATA ANALYSIS, CONSULTS, AND MEDICAL DECISION MAKING      All labs have been independently reviewed by me.  All radiology studies have been reviewed by me and discussed with radiologist dictating the report.   EKG's independently viewed and interpreted by me.  Discussion below represents my analysis of pertinent findings related to patient's condition, differential diagnosis, treatment plan and final disposition.      ED Course as of Nov 20 2003 Fri Nov 20, 2020   1846 Patient blood pressures during last admission were chronically elevated with machine testing however with manual blood pressures it was noted that it was actually better than represented by the machine.  We will obtain manual blood pressure at this time.    [RS]   1847 EKG           EKG time: 1836  Rhythm/Rate: Sinus, 70  P waves and [place]: MIGUELINA within normal limits  QRS, axis: Narrow QRS complex  ST and T waves: No STEMI; QTC within normal limits    Interpreted Contemporaneously by me, independently viewed  Comparison: Unchanged from 8/24/2020      [RS]   1850 BP better with manual assessment-184/92.  10 of hydralazine IV ordered.    [RS]   1957 BUN: 13 [RS]   1957 Creatinine(!): 0.46 [RS]   1957 Troponin T: <0.010 [RS]   1957 proBNP: 114.7 [RS]   1957 TSH Baseline: 4.080 [RS]   1957 Magnesium: 2.0 [RS]   1957 Patient declined to give urine specimen.    [RS]   1958 Patient feels much improved at this time.  My manual blood pressure at this time was 170/84.  Patient agreeable with discharge and outpatient follow-up with her  primary care.    [RS]      ED Course User Index  [RS] Erik Watson MD       AS OF 20:03 EST VITALS:    BP-170/84   HR - 74  TEMP - 98.9 °F (37.2 °C) (Tympanic)  O2 SATS - 100%      DIAGNOSIS  Final diagnoses:   Uncontrolled hypertension         DISPOSITION  DISCHARGE    Patient discharged in stable condition.    Reviewed implications of results, diagnosis, meds, responsibility to follow up, warning signs and symptoms of possible worsening, potential complications and reasons to return to ER.    Patient/Family voiced understanding of above instructions.    Discussed plan for discharge, as there is no emergent indication for admission. Patient referred to primary care provider for regular health maintenance. Pt/family is agreeable and understands need for follow up and possible repeat testing.  Pt is aware that discharge does not mean that nothing is wrong but it indicates no emergency is present that requires admission and they must continue care with follow-up as given below or physician of their choice.     FOLLOW-UP  Cheng Parra MD  [address] [address] [address]  [phone]    Go in 3 days           Medication List      No changes were made to your prescriptions during this visit.            Erik Watson MD  11/20/20 2003

## 2020-11-21 LAB — QT INTERVAL: 412 MS

## 2020-11-21 NOTE — ED NOTES
"Walked pt to the bathroom so pt could get a urine sample . Pt states \" that she forgot to pee in the cup\".      Vivian Null  11/20/20 1944    "

## 2020-12-05 DIAGNOSIS — M17.0 OSTEOARTHRITIS OF BOTH KNEES, UNSPECIFIED OSTEOARTHRITIS TYPE: ICD-10-CM

## 2020-12-05 DIAGNOSIS — M25.561 CHRONIC PAIN OF BOTH KNEES: ICD-10-CM

## 2020-12-05 DIAGNOSIS — M25.562 CHRONIC PAIN OF BOTH KNEES: ICD-10-CM

## 2020-12-05 DIAGNOSIS — G89.29 CHRONIC PAIN OF BOTH KNEES: ICD-10-CM

## 2020-12-07 RX ORDER — NAPROXEN 500 MG/1
TABLET ORAL
Qty: 60 TABLET | Refills: 0 | Status: SHIPPED | OUTPATIENT
Start: 2020-12-07 | End: 2021-03-31

## 2021-02-08 ENCOUNTER — OFFICE VISIT (OUTPATIENT)
Dept: FAMILY MEDICINE CLINIC | Facility: CLINIC | Age: 61
End: 2021-02-08

## 2021-02-08 VITALS — HEIGHT: 65 IN | WEIGHT: 236 LBS | BODY MASS INDEX: 39.32 KG/M2

## 2021-02-08 DIAGNOSIS — R09.82 POST-NASAL DRIP: ICD-10-CM

## 2021-02-08 DIAGNOSIS — R09.81 NASAL CONGESTION: ICD-10-CM

## 2021-02-08 DIAGNOSIS — J98.8 RESPIRATORY INFECTION: ICD-10-CM

## 2021-02-08 DIAGNOSIS — R05.9 COUGH: Primary | ICD-10-CM

## 2021-02-08 PROCEDURE — 99442 PR PHYS/QHP TELEPHONE EVALUATION 11-20 MIN: CPT | Performed by: FAMILY MEDICINE

## 2021-02-08 RX ORDER — AMOXICILLIN 500 MG/1
500 CAPSULE ORAL 2 TIMES DAILY
Qty: 14 CAPSULE | Refills: 0 | Status: SHIPPED | OUTPATIENT
Start: 2021-02-08 | End: 2021-02-15

## 2021-02-08 NOTE — PROGRESS NOTES
Henny Virgens    1960  [id number]  Time spent reviewing E-Visit Questionnaire-more than 10 minutes up to 20 minutes.  I have reviewed the e-Visit questionnaire and patient's answers, my assessment and plan documented below.    HPI    This visit was completed via telephone due to the restriction of the COVID-19 pandemic.  All issues as below were discussed and addressed but no physical exam was performed.  It was felt that the patient should be evaluated in clinic and they were directed there.  The patient verbally consented to visit.    Henny PEREZ Erika 60 y.o. female who presents today via telephone visit for evaluation of upper respiratory congestion, cough. Symptoms include congestion, sore throat, post nasal drip and productive cough.  Onset of symptoms was a few days ago, unchanged since that time. Patient denies shortness of breath, wheezing, fever, diarrhea, chills, sweats.   Evaluation to date: none Treatment to date:  Mucinex.     Diagnoses and all orders for this visit:    1. Cough (Primary)  -     amoxicillin (AMOXIL) 500 MG capsule; Take 1 capsule by mouth 2 (Two) Times a Day for 7 days.  Dispense: 14 capsule; Refill: 0  -     COVID-19,[facility] ROUTINE, NP/OP SWAB IN TRANSPORT MEDIA OR ESWAB 72 HR TAT - Swab, Anterior nasal; Future    2. Post-nasal drip  -     amoxicillin (AMOXIL) 500 MG capsule; Take 1 capsule by mouth 2 (Two) Times a Day for 7 days.  Dispense: 14 capsule; Refill: 0  -     COVID-19,[facility] ROUTINE, NP/OP SWAB IN TRANSPORT MEDIA OR ESWAB 72 HR TAT - Swab, Anterior nasal; Future    3. Nasal congestion  -     amoxicillin (AMOXIL) 500 MG capsule; Take 1 capsule by mouth 2 (Two) Times a Day for 7 days.  Dispense: 14 capsule; Refill: 0  -     COVID-19,[facility] ROUTINE, NP/OP SWAB IN TRANSPORT MEDIA OR ESWAB 72 HR TAT - Swab, Anterior nasal; Future    4. Respiratory infection  -     amoxicillin (AMOXIL) 500 MG capsule; Take 1 capsule by mouth 2 (Two) Times a Day for 7 days.  Dispense: 14  capsule; Refill: 0  -     COVID-19,[facility] ROUTINE, NP/OP SWAB IN TRANSPORT MEDIA OR ESWAB 72 HR TAT - Swab, Anterior nasal; Future         Needs to schedule follow-up with cardiology.  Needs to reschedule with neurosurgery as she had COVID-19 at the time.  Needs to reschedule home sleep study as well.    Discussed this is likely allergic rhinitis versus viral infection.  Advised to use Flonase daily and if no improvement after a couple of days can start amoxicillin as noted above.  Patient is concerned about Covid as she had Covid few months ago.  She is high risk.  Suspected COVID infection.  Due to recent symptoms and patient there was concern for possible COVID 19 infection.  Signs and symptoms discussed with patient.  Patient educated to self isolate in her room away from others they live with.  Mask available.  Patient advised not to leave house for any reason.    Self treatment discussed including Tylenol for fever, pain or myalgias; cough cold medications for symptoms.  Patient to check temperature daily.  Monitor for symptoms of respiratory distress.  To check in daily with her office via my chart or phone with symptoms.    Nature disease to cause severe respiratory distress day 8 or 9 discussed.  If needs emergent care to notify EMS or ED or our office that they may have coded to allow for proper PPE and isolation.      Any medications prescribed have been sent electronically to   [facility] [address] [place] [address] - [address] [facility] - [phone]  - [phone]   [address] [facility] [address]  Phone: [phone] Fax: [phone]        Cheng Parra MD  02/08/21  14:48 EST

## 2021-02-11 ENCOUNTER — TELEPHONE (OUTPATIENT)
Dept: FAMILY MEDICINE CLINIC | Facility: CLINIC | Age: 61
End: 2021-02-11

## 2021-02-11 LAB — SARS-COV-2 RNA RESP QL NAA+PROBE: NOT DETECTED

## 2021-02-12 ENCOUNTER — TELEPHONE (OUTPATIENT)
Dept: FAMILY MEDICINE CLINIC | Facility: CLINIC | Age: 61
End: 2021-02-12

## 2021-02-12 NOTE — TELEPHONE ENCOUNTER
Caller: Henny James    Relationship: Self    Best call back number: [phone]         Caller requesting test results: Henny     What test was performed: Covid     When was the test performed: 2-8-21  Or 2-9-21     Where was the test performed: our office     Additional notes: patient missed a call from Dr Parra's office, left message for patient to call back.

## 2021-02-16 DIAGNOSIS — G89.29 CHRONIC PAIN OF BOTH KNEES: ICD-10-CM

## 2021-02-16 DIAGNOSIS — M25.562 CHRONIC PAIN OF BOTH KNEES: ICD-10-CM

## 2021-02-16 DIAGNOSIS — M25.561 CHRONIC PAIN OF BOTH KNEES: ICD-10-CM

## 2021-02-16 DIAGNOSIS — M17.0 OSTEOARTHRITIS OF BOTH KNEES, UNSPECIFIED OSTEOARTHRITIS TYPE: ICD-10-CM

## 2021-02-16 RX ORDER — NAPROXEN 500 MG/1
TABLET ORAL
Qty: 60 TABLET | Refills: 0 | OUTPATIENT
Start: 2021-02-16

## 2021-02-16 NOTE — TELEPHONE ENCOUNTER
Needs follow up with Cards first; HTN was uncontrolled per last note but slightly better. Given h/o CAD should not be taking NSAIDs alejandro with uncontrolled HTN.

## 2021-03-03 ENCOUNTER — TELEPHONE (OUTPATIENT)
Dept: FAMILY MEDICINE CLINIC | Facility: CLINIC | Age: 61
End: 2021-03-03

## 2021-03-03 NOTE — TELEPHONE ENCOUNTER
PATIENT CALLED REQUESTING A LETTER STATING THAT SHE HAS HEALTH CONDITIONS THAT ALLOW HER TO RECEIVE THE COVID VACCINE.    PLEASE ADVISE  [phone]

## 2021-03-15 ENCOUNTER — OFFICE VISIT (OUTPATIENT)
Dept: FAMILY MEDICINE CLINIC | Facility: CLINIC | Age: 61
End: 2021-03-15

## 2021-03-15 VITALS
BODY MASS INDEX: 40.82 KG/M2 | HEART RATE: 72 BPM | RESPIRATION RATE: 16 BRPM | TEMPERATURE: 96.6 F | WEIGHT: 245 LBS | HEIGHT: 65 IN | SYSTOLIC BLOOD PRESSURE: 213 MMHG | DIASTOLIC BLOOD PRESSURE: 96 MMHG

## 2021-03-15 DIAGNOSIS — I10 UNCONTROLLED HYPERTENSION: Primary | ICD-10-CM

## 2021-03-15 DIAGNOSIS — R53.82 CHRONIC FATIGUE: ICD-10-CM

## 2021-03-15 PROCEDURE — 99214 OFFICE O/P EST MOD 30 MIN: CPT | Performed by: FAMILY MEDICINE

## 2021-03-15 RX ORDER — HYDRALAZINE HYDROCHLORIDE 25 MG/1
25 TABLET, FILM COATED ORAL 3 TIMES DAILY
Qty: 90 TABLET | Refills: 3 | Status: SHIPPED | OUTPATIENT
Start: 2021-03-15 | End: 2021-09-21

## 2021-03-15 NOTE — PROGRESS NOTES
"Zaria James is a 60 y.o. female.     CC: Fatigue/Uncontrolled HTN    History of Present Illness     Pt of 's comes in today c/o HAs (no cp/dyspnea) chronically and fatigue \"all the time\", even after a full PMs sleep. FH: mom with HIRAL. Pt reports Dr Parra tried to get her to do a Sleep Study prior but she didn't. Pt also reports she is currently NOT on the Hydralazine that Dr Rodriguez had given her, although taking the other 4 BP meds.      The following portions of the patient's history were reviewed and updated as appropriate: allergies, current medications, past family history, past medical history, past social history, past surgical history and problem list.    Review of Systems   Constitutional: Positive for fatigue. Negative for activity change, chills and fever.   HENT: Positive for congestion.    Respiratory: Negative for cough.    Cardiovascular: Negative for chest pain.   Psychiatric/Behavioral: Negative for dysphoric mood.       BP (!) 213/96   Pulse 72   Temp 96.6 °F (35.9 °C) (Oral)   Resp 16   Ht 165.1 cm (65\")   Wt 111 kg (245 lb)   BMI 40.77 kg/m²     Objective   Physical Exam  Constitutional:       General: She is not in acute distress.     Appearance: She is well-developed.   Cardiovascular:      Rate and Rhythm: Normal rate and regular rhythm.   Pulmonary:      Effort: Pulmonary effort is normal.      Breath sounds: Normal breath sounds.   Neurological:      Mental Status: She is alert and oriented to person, place, and time.   Psychiatric:         Behavior: Behavior normal.         Thought Content: Thought content normal.         Assessment/Plan   Diagnoses and all orders for this visit:    1. Uncontrolled hypertension (Primary)  -     hydrALAZINE (APRESOLINE) 25 MG tablet; Take 1 tablet by mouth 3 (Three) Times a Day.  Dispense: 90 tablet; Refill: 3  -     Comprehensive Metabolic Panel  -     CBC & Differential  -     TSH  -     Aldosterone  -     Cortisol  -     US " Renal Bilateral; Future    2. Chronic fatigue  -     Ambulatory Referral to Sleep Medicine    Will start 2nd HTN evaluation and get a quick HIRAL w/u.

## 2021-03-17 ENCOUNTER — OFFICE VISIT (OUTPATIENT)
Dept: SLEEP MEDICINE | Facility: HOSPITAL | Age: 61
End: 2021-03-17

## 2021-03-17 VITALS
WEIGHT: 240 LBS | HEART RATE: 63 BPM | BODY MASS INDEX: 39.99 KG/M2 | OXYGEN SATURATION: 98 % | DIASTOLIC BLOOD PRESSURE: 95 MMHG | HEIGHT: 65 IN | SYSTOLIC BLOOD PRESSURE: 176 MMHG

## 2021-03-17 DIAGNOSIS — G47.30 SLEEP APNEA, UNSPECIFIED TYPE: Primary | ICD-10-CM

## 2021-03-17 DIAGNOSIS — G47.8 NON-RESTORATIVE SLEEP: ICD-10-CM

## 2021-03-17 DIAGNOSIS — E66.9 OBESITY (BMI 30-39.9): ICD-10-CM

## 2021-03-17 DIAGNOSIS — G47.10 HYPERSOMNIA: ICD-10-CM

## 2021-03-17 PROCEDURE — 99213 OFFICE O/P EST LOW 20 MIN: CPT | Performed by: FAMILY MEDICINE

## 2021-03-17 PROCEDURE — G0463 HOSPITAL OUTPT CLINIC VISIT: HCPCS

## 2021-03-17 NOTE — PROGRESS NOTES
[facility] New Patient/Consultation       Reason for Consultation: Uncontrolled hypertension hypersomnia nonrestorative sleep      Patient Care Team:  Cheng Parra MD as PCP - General (Family Medicine)  Cheng Parra MD as Consulting Physician (Sleep Medicine)      History of present illness:  Thank you for asking me to see your patient.  The patient is a 60 y.o. female with history of uncontrolled hypertension anemia bipolar 1 disorder hyperlipidemia CAD atrial fibrillation presents today with concern for sleep apnea.  I saw patient in the primary care office and set her up with home sleep study which she did not show up for.  Continues to have uncontrolled hypertension; referred for work-up from sleep medicine perspective.    Sleep Schedule:  Bed time: 7 PM  Sleep latency: Varies  Wake time: 12 AM 3 AM 4 AM; wakes up several times throughout the night  Average hours slept: Around 3 hours  Non-restorative sleep: Yes  Number of naps per day: All day  Rotating shifts?:  No  Nocturia: 5X  Electronics in bedroom: Y    Excessive daytime sleepiness or drowsiness:Y  Any accidents at work due to sleepiness in the last 5 years:N  Any difficulty driving due to sleepiness or being drowsy: N  Weight changed in the last 5 years:Y - GAINED 50 LBS    Snoring:N  Witnessed apneas:N  Have you ever awakened gasping for breath, coughing, choking or respiratory discomfort: N  Morning headaches: N  Awaken with a sore throat or dry mouth: N    Any reports of leg jerking at night: Y  Urge sensations: Y  Does pain disrupt sleep: Y  Sweating during sleep: N  Teeth grinding: Y    Any sudden episodes of sleep during the day: Y  Sleep paralysis/hallucinations: N  Muscle weakness with laughing/anger: Y  Nightmares: N  Sleep walking: N    Are you sleepy when you increase your sleep time: N  Do you sleep better away from your own bed: N    ESS: 13    Social History: Reports tobacco use 1 alcoholic drink per week    Review of  "Systems:    A complete review of systems was done and all were negative with the exception of ear pain joint pain swollen joints irregular heartbeat swollen ankles dizziness anxiety depression frequent heartburn diarrhea always to cold always to warm rash    Allergies:  Patient has no known allergies.    Family History: HIRAL yes       Current Outpatient Medications:   •  ALPRAZolam (XANAX) 1 MG tablet, Take 1 mg by mouth 3 (Three) Times a Day As Needed for Anxiety., Disp: , Rfl:   •  amLODIPine (NORVASC) 10 MG tablet, Take 1 tablet by mouth Daily., Disp: 30 tablet, Rfl: 1  •  ARIPiprazole (ABILIFY) 10 MG tablet, Take 10 mg by mouth Every Night., Disp: , Rfl:   •  aspirin 81 MG chewable tablet, Chew 81 mg Daily., Disp: , Rfl:   •  Cyanocobalamin (B-12 PO), Take  by mouth., Disp: , Rfl:   •  hydrALAZINE (APRESOLINE) 25 MG tablet, Take 1 tablet by mouth 3 (Three) Times a Day., Disp: 90 tablet, Rfl: 3  •  lamoTRIgine (LaMICtal) 200 MG tablet, Take 200 mg by mouth Every Night., Disp: , Rfl:   •  losartan (COZAAR) 100 MG tablet, Take 1 tablet by mouth Daily., Disp: 90 tablet, Rfl: 1  •  metoprolol succinate XL (TOPROL-XL) 100 MG 24 hr tablet, Take 1 tablet by mouth Daily., Disp: 90 tablet, Rfl: 1  •  naproxen (NAPROSYN) 500 MG tablet, TAKE ONE TABLET BY MOUTH TWICE A DAY AS NEEDED FOR MODERATE PAIN, Disp: 60 tablet, Rfl: 0  •  Pyridoxine HCl (B-6 PO), Take  by mouth., Disp: , Rfl:   •  spironolactone (ALDACTONE) 25 MG tablet, Take 1 tablet by mouth Daily., Disp: 30 tablet, Rfl: 11  •  traZODone (DESYREL) 50 MG tablet, As Needed., Disp: , Rfl:   •  VITAMIN E PO, Take  by mouth., Disp: , Rfl:     Vital Signs:    Vitals:    03/17/21 1003   BP: 176/95   Pulse: 63   SpO2: 98%   Weight: 109 kg (240 lb)   Height: 165.1 cm (65\")      Body mass index is 39.94 kg/m².  Neck Circumference: 14.75 inches      Physical Exam:   General Alert and oriented. No acute distress noted   Pharynx/Throat Class III Mallampati airway, large tongue, " no evidence of redundant lateral pharyngeal tissue. No oral lesions. No thrush. Moist mucous membranes.   Head Normocephalic. Symmetrical. Atraumatic.    Nose No septal deviation. No drainage   Chest Wall Normal shape. Symmetric expansion with respiration. No tenderness.   Neck Trachea midline, no thyromegaly or adenopathy    Lungs Clear to auscultation bilaterally. No wheezes. No rhonchi. No rales. Respirations regular, even and unlabored.   Heart Regular rhythm and normal rate. Normal S1 and S2. No murmur   Abdomen Soft, non-tender and non-distended. Normal bowel sounds. No masses.   Extremities Moves all extremities well. No edema   Psychiatric Normal mood and affect.       Impression:  1. Sleep apnea, unspecified type    2. Hypersomnia    3. Non-restorative sleep    4. Obesity (BMI 30-39.9)        Plan:    Good sleep hygiene measures should be maintained.  Weight loss would be beneficial in this patient who is obese with BMI 39.9.    I discussed the pathophysiology of obstructive sleep apnea with the patient.  We discussed the adverse outcomes associated with untreated sleep-disordered breathing.  We discussed treatment modalities of obstructive sleep apnea including CPAP device as well as oral mandibular advancement device. Sleep study will be scheduled to establish definitive diagnosis of sleep disorder breathing.  Weight loss will be strongly beneficial in order to reduce the severity of sleep-disordered breathing.  Patient has narrow oropharyngeal structure.  Caution during activities that require prolonged concentration is strongly advised.  Patient will be notified of sleep study results after sleep study is completed.  If sleep apnea is only mild,  oral mandibular advancement device may be one of the treatment options.  However if sleep apnea is moderately severe, CPAP treatment will be strongly encouraged.  The patient is not opposed to treatment with CPAP device if we confirm significant obstructive  sleep apnea on polysomnography.     Thank you for allowing me to participate in your patient's care.    Cheng Parra MD  Sleep Medicine  03/17/21  10:25 EDT

## 2021-03-22 ENCOUNTER — TELEPHONE (OUTPATIENT)
Dept: FAMILY MEDICINE CLINIC | Facility: CLINIC | Age: 61
End: 2021-03-22

## 2021-03-22 NOTE — TELEPHONE ENCOUNTER
Please advise.  Pt is very upset that her results are not in.  Pt wants someone to give her a call back once we find out.  Pt states she really don't want to be stuck again.  Thanks

## 2021-03-22 NOTE — TELEPHONE ENCOUNTER
Caller: Henny James    Relationship: Self    Best call back number: [phone]    What test was performed: BLOOD WORK/LABS    When was the test performed: 03/15    Where was the test performed: IN OFFICE     Additional notes: PATIENT HAD LABWORK DONE ON 03.15 BUT HASN'T HEARD FROM ANYONE WITH THE RESULTS.. PATIENT WOULD LIKE A CALL BACK WITH THOSE RESULTS AND TO DISCUSS THEM.

## 2021-03-23 LAB
ALBUMIN SERPL-MCNC: 4.7 G/DL (ref 3.5–5.2)
ALBUMIN/GLOB SERPL: 1.5 G/DL
ALDOST SERPL-MCNC: 2.1 NG/DL (ref 0–30)
ALP SERPL-CCNC: 112 U/L (ref 39–117)
ALT SERPL-CCNC: 27 U/L (ref 1–33)
AST SERPL-CCNC: 18 U/L (ref 1–32)
BASOPHILS # BLD MANUAL: 0.09 10*3/MM3 (ref 0–0.2)
BASOPHILS NFR BLD MANUAL: 1.1 % (ref 0–1.5)
BILIRUB SERPL-MCNC: 0.2 MG/DL (ref 0–1.2)
BUN SERPL-MCNC: 10 MG/DL (ref 6–20)
BUN/CREAT SERPL: 12 (ref 7–25)
CALCIUM SERPL-MCNC: 9.7 MG/DL (ref 8.6–10.5)
CHLORIDE SERPL-SCNC: 100 MMOL/L (ref 98–107)
CO2 SERPL-SCNC: 25.2 MMOL/L (ref 22–29)
CORTIS SERPL-MCNC: 2.9 UG/DL
CREAT SERPL-MCNC: 0.83 MG/DL (ref 0.57–1)
DIFFERENTIAL COMMENT: ABNORMAL
EOSINOPHIL # BLD MANUAL: 0.09 10*3/MM3 (ref 0–0.4)
EOSINOPHIL NFR BLD MANUAL: 1.1 % (ref 0.3–6.2)
ERYTHROCYTE [DISTWIDTH] IN BLOOD BY AUTOMATED COUNT: 14.6 % (ref 12.3–15.4)
GLOBULIN SER CALC-MCNC: 3.1 GM/DL
GLUCOSE SERPL-MCNC: 91 MG/DL (ref 65–99)
HCT VFR BLD AUTO: 38.4 % (ref 34–46.6)
HGB BLD-MCNC: 12.9 G/DL (ref 12–15.9)
LYMPHOCYTES # BLD MANUAL: 3.11 10*3/MM3 (ref 0.7–3.1)
LYMPHOCYTES NFR BLD MANUAL: 38 % (ref 19.6–45.3)
MCH RBC QN AUTO: 29.2 PG (ref 26.6–33)
MCHC RBC AUTO-ENTMCNC: 33.6 G/DL (ref 31.5–35.7)
MCV RBC AUTO: 86.9 FL (ref 79–97)
MONOCYTES # BLD MANUAL: 0.27 10*3/MM3 (ref 0.1–0.9)
MONOCYTES NFR BLD MANUAL: 3.3 % (ref 5–12)
NEUTROPHILS # BLD MANUAL: 4.63 10*3/MM3 (ref 1.7–7)
NEUTROPHILS NFR BLD MANUAL: 56.5 % (ref 42.7–76)
NRBC BLD AUTO-RTO: 0.1 /100 WBC (ref 0–0.2)
PLATELET # BLD AUTO: 349 10*3/MM3 (ref 140–450)
PLATELET BLD QL SMEAR: ABNORMAL
POTASSIUM SERPL-SCNC: 4.2 MMOL/L (ref 3.5–5.2)
PROT SERPL-MCNC: 7.8 G/DL (ref 6–8.5)
RBC # BLD AUTO: 4.42 10*6/MM3 (ref 3.77–5.28)
RBC MORPH BLD: ABNORMAL
SODIUM SERPL-SCNC: 139 MMOL/L (ref 136–145)
TSH SERPL DL<=0.005 MIU/L-ACNC: 3.52 UIU/ML (ref 0.27–4.2)
WBC # BLD AUTO: 8.19 10*3/MM3 (ref 3.4–10.8)

## 2021-03-26 NOTE — PROGRESS NOTES
All labs essentially normal.  Continue with plan of care as discussed at last visit with me in the sleep lab; she must scheduled follow-up with cardiology soon as possible.  She needs to get the sleep study done as discussed as well.

## 2021-03-30 DIAGNOSIS — M25.562 CHRONIC PAIN OF BOTH KNEES: ICD-10-CM

## 2021-03-30 DIAGNOSIS — M17.0 OSTEOARTHRITIS OF BOTH KNEES, UNSPECIFIED OSTEOARTHRITIS TYPE: ICD-10-CM

## 2021-03-30 DIAGNOSIS — M25.561 CHRONIC PAIN OF BOTH KNEES: ICD-10-CM

## 2021-03-30 DIAGNOSIS — I10 ESSENTIAL HYPERTENSION: ICD-10-CM

## 2021-03-30 DIAGNOSIS — G89.29 CHRONIC PAIN OF BOTH KNEES: ICD-10-CM

## 2021-03-31 ENCOUNTER — TELEPHONE (OUTPATIENT)
Dept: FAMILY MEDICINE CLINIC | Facility: CLINIC | Age: 61
End: 2021-03-31

## 2021-03-31 RX ORDER — NAPROXEN 500 MG/1
TABLET ORAL
Qty: 30 TABLET | Refills: 0 | Status: SHIPPED | OUTPATIENT
Start: 2021-03-31 | End: 2021-04-16 | Stop reason: SDUPTHER

## 2021-03-31 RX ORDER — AMLODIPINE BESYLATE 5 MG/1
TABLET ORAL
Qty: 30 TABLET | Refills: 0 | Status: SHIPPED | OUTPATIENT
Start: 2021-03-31 | End: 2021-05-05

## 2021-03-31 NOTE — TELEPHONE ENCOUNTER
Pt has received 30 day supply per Dr. Parra           Refilled  amlodipine for 30 days only. Refilled naproxen only for 15 days.  She has uncontrolled hypertension.  She has not yet followed up with cardiologist despite being told to multiple times. She canceled her renal ultrasound yesterday.  She is not getting things done that we need her to get done so we can take care of her appropriately.  Naproxen is an anti-inflammatory which can increase risk for cardiovascular disease; given that her hypertension is uncontrolled and we are not sure why yet I want her to limit usage of this.     She needs to set up visit with cardiology as soon as possible and she needs to reschedule her renal ultrasound.  Per records she was also supposed to reschedule appointment with neurosurgery which I do not see done yet.

## 2021-04-02 ENCOUNTER — APPOINTMENT (OUTPATIENT)
Dept: ULTRASOUND IMAGING | Facility: HOSPITAL | Age: 61
End: 2021-04-02

## 2021-04-13 ENCOUNTER — HOSPITAL ENCOUNTER (OUTPATIENT)
Dept: ULTRASOUND IMAGING | Facility: HOSPITAL | Age: 61
Discharge: HOME | End: 2021-04-13

## 2021-04-13 ENCOUNTER — HOSPITAL ENCOUNTER (OUTPATIENT)
Dept: SLEEP MEDICINE | Facility: HOSPITAL | Age: 61
Discharge: HOME | End: 2021-04-13

## 2021-04-13 DIAGNOSIS — E66.9 OBESITY (BMI 30-39.9): ICD-10-CM

## 2021-04-13 DIAGNOSIS — I10 UNCONTROLLED HYPERTENSION: ICD-10-CM

## 2021-04-13 DIAGNOSIS — G47.8 NON-RESTORATIVE SLEEP: ICD-10-CM

## 2021-04-13 DIAGNOSIS — G47.10 HYPERSOMNIA: ICD-10-CM

## 2021-04-13 DIAGNOSIS — G47.30 SLEEP APNEA, UNSPECIFIED TYPE: ICD-10-CM

## 2021-04-13 PROCEDURE — 76775 US EXAM ABDO BACK WALL LIM: CPT

## 2021-04-16 ENCOUNTER — TELEPHONE (OUTPATIENT)
Dept: FAMILY MEDICINE CLINIC | Facility: CLINIC | Age: 61
End: 2021-04-16

## 2021-04-16 DIAGNOSIS — M17.0 OSTEOARTHRITIS OF BOTH KNEES, UNSPECIFIED OSTEOARTHRITIS TYPE: ICD-10-CM

## 2021-04-16 DIAGNOSIS — M25.562 CHRONIC PAIN OF BOTH KNEES: ICD-10-CM

## 2021-04-16 DIAGNOSIS — G89.29 CHRONIC PAIN OF BOTH KNEES: ICD-10-CM

## 2021-04-16 DIAGNOSIS — M25.561 CHRONIC PAIN OF BOTH KNEES: ICD-10-CM

## 2021-04-16 RX ORDER — NAPROXEN 500 MG/1
500 TABLET ORAL 2 TIMES DAILY PRN
Qty: 30 TABLET | Refills: 0 | Status: SHIPPED | OUTPATIENT
Start: 2021-04-16 | End: 2021-06-07 | Stop reason: SDUPTHER

## 2021-04-16 NOTE — TELEPHONE ENCOUNTER
I will refill her naproxen 1 more time only for 15 days.  As she was told the last time she asked for a refill for this, she has uncontrolled hypertension.  She has not yet followed up with cardiologist despite being told to multiple times.   She is not getting things done that we need her to get done so we can take care of her appropriately.  Naproxen is an anti-inflammatory which can increase risk for cardiovascular disease; given that her hypertension is uncontrolled and we are not sure why yet, I want her to limit usage of this.     She needs to set up visit with cardiology as soon as possible. Per records she was also supposed to reschedule appointment with neurosurgery which I do not see done yet.

## 2021-04-16 NOTE — TELEPHONE ENCOUNTER
.    Caller: Henny James    Relationship: Self    Best call back number: [phone]*    Caller requesting test results: YES    What test was performed: ULTRASOUND    When was the test performed: 04/12/21    Where was the test performed: [facility]    Additional notes: PATIENT REQUESTING A CALLBACK WITH RESULTS OF ULTRASOUND

## 2021-04-16 NOTE — TELEPHONE ENCOUNTER
Caller: Henny James    Relationship: Self    Best call back number: [phone]*    Medication needed:   Requested Prescriptions     Pending Prescriptions Disp Refills   • naproxen (NAPROSYN) 500 MG tablet 30 tablet 0     Sig: Take 1 tablet by mouth 2 (Two) Times a Day As Needed for Moderate Pain .       When do you need the refill by: 04/19/21    What additional details did the patient provide when requesting the medication: PATIENT STATES THAT SHE ONLY HAS 6 TABLETS LEFT AND WAS ONLY DISPENSED 30 TABLETS FOR LAST REFILL AND SHE TAKES THE NAPROXEN TWICE A DAY. THE PATIENT IS REQUESTING ENOUGH MEDICATION TO BE ABLE TO TAKE IT TWICE A DAY WITHOUT RUNNING OUT OF MEDICATION.    Does the patient have less than a 3 day supply:  [] Yes  [] No    What is the patient's preferred pharmacy: [facility] [address] [place] [address] [phone] [facility] [phone] FX

## 2021-04-16 NOTE — TELEPHONE ENCOUNTER
THE PATIENT CALLED AND STATED THAT THE HOME SLEEP STUDY IS NOT WORKING FOR HER. SHE STATED THAT SHE TRIED IT AND WILL NOT TRY IT AGAIN. THE PATIENT STATES THAT SHE WANTS AN IN-PATIENT SLEEP STUDY.    PATIENT CALLBACK: [phone]

## 2021-04-19 ENCOUNTER — TELEPHONE (OUTPATIENT)
Dept: SLEEP MEDICINE | Facility: HOSPITAL | Age: 61
End: 2021-04-19

## 2021-04-19 NOTE — TELEPHONE ENCOUNTER
Patient sent epic message stating that she did not want to repeat HST , would prefer inlab, scheduled [facility] 5/9/2021, sent Dr Parra a message to order test

## 2021-04-20 DIAGNOSIS — G47.10 HYPERSOMNIA: Primary | ICD-10-CM

## 2021-04-20 DIAGNOSIS — G47.8 NON-RESTORATIVE SLEEP: ICD-10-CM

## 2021-04-20 DIAGNOSIS — E66.9 OBESITY (BMI 30-39.9): ICD-10-CM

## 2021-04-20 DIAGNOSIS — G47.30 SLEEP APNEA, UNSPECIFIED TYPE: ICD-10-CM

## 2021-04-21 ENCOUNTER — TRANSCRIBE ORDERS (OUTPATIENT)
Dept: SLEEP MEDICINE | Facility: HOSPITAL | Age: 61
End: 2021-04-21

## 2021-04-21 DIAGNOSIS — Z01.818 OTHER SPECIFIED PRE-OPERATIVE EXAMINATION: Primary | ICD-10-CM

## 2021-05-05 DIAGNOSIS — M17.0 OSTEOARTHRITIS OF BOTH KNEES, UNSPECIFIED OSTEOARTHRITIS TYPE: ICD-10-CM

## 2021-05-05 DIAGNOSIS — M25.562 CHRONIC PAIN OF BOTH KNEES: ICD-10-CM

## 2021-05-05 DIAGNOSIS — M25.561 CHRONIC PAIN OF BOTH KNEES: ICD-10-CM

## 2021-05-05 DIAGNOSIS — G89.29 CHRONIC PAIN OF BOTH KNEES: ICD-10-CM

## 2021-05-05 DIAGNOSIS — I10 ESSENTIAL HYPERTENSION: ICD-10-CM

## 2021-05-05 RX ORDER — NAPROXEN 500 MG/1
TABLET ORAL
Qty: 30 TABLET | Refills: 0 | OUTPATIENT
Start: 2021-05-05

## 2021-05-05 RX ORDER — LOSARTAN POTASSIUM 100 MG/1
TABLET ORAL
Qty: 90 TABLET | Refills: 0 | Status: SHIPPED | OUTPATIENT
Start: 2021-05-05 | End: 2021-09-27 | Stop reason: ALTCHOICE

## 2021-05-05 RX ORDER — AMLODIPINE BESYLATE 5 MG/1
TABLET ORAL
Qty: 30 TABLET | Refills: 0 | Status: SHIPPED | OUTPATIENT
Start: 2021-05-05 | End: 2021-06-28

## 2021-05-07 ENCOUNTER — LAB (OUTPATIENT)
Dept: LAB | Facility: HOSPITAL | Age: 61
End: 2021-05-07

## 2021-05-07 DIAGNOSIS — Z01.818 OTHER SPECIFIED PRE-OPERATIVE EXAMINATION: ICD-10-CM

## 2021-05-07 PROCEDURE — C9803 HOPD COVID-19 SPEC COLLECT: HCPCS

## 2021-05-07 PROCEDURE — U0004 COV-19 TEST NON-CDC HGH THRU: HCPCS

## 2021-05-08 LAB — SARS-COV-2 ORF1AB RESP QL NAA+PROBE: NOT DETECTED

## 2021-05-09 ENCOUNTER — HOSPITAL ENCOUNTER (OUTPATIENT)
Dept: SLEEP MEDICINE | Facility: HOSPITAL | Age: 61
Discharge: HOME | End: 2021-05-09
Admitting: FAMILY MEDICINE

## 2021-05-09 DIAGNOSIS — G47.30 SLEEP APNEA, UNSPECIFIED TYPE: ICD-10-CM

## 2021-05-09 DIAGNOSIS — G47.8 NON-RESTORATIVE SLEEP: ICD-10-CM

## 2021-05-09 DIAGNOSIS — E66.9 OBESITY (BMI 30-39.9): ICD-10-CM

## 2021-05-09 DIAGNOSIS — G47.10 HYPERSOMNIA: ICD-10-CM

## 2021-05-09 PROCEDURE — 95810 POLYSOM 6/> YRS 4/> PARAM: CPT | Performed by: FAMILY MEDICINE

## 2021-05-09 PROCEDURE — 95810 POLYSOM 6/> YRS 4/> PARAM: CPT

## 2021-05-25 ENCOUNTER — HOSPITAL ENCOUNTER (OUTPATIENT)
Dept: CARDIOLOGY | Facility: HOSPITAL | Age: 61
Discharge: HOME | End: 2021-05-25
Admitting: NURSE PRACTITIONER

## 2021-05-25 ENCOUNTER — TELEPHONE (OUTPATIENT)
Dept: CARDIOLOGY | Facility: CLINIC | Age: 61
End: 2021-05-25

## 2021-05-25 ENCOUNTER — OFFICE VISIT (OUTPATIENT)
Dept: CARDIOLOGY | Facility: CLINIC | Age: 61
End: 2021-05-25

## 2021-05-25 VITALS
HEART RATE: 56 BPM | WEIGHT: 245 LBS | BODY MASS INDEX: 40.82 KG/M2 | SYSTOLIC BLOOD PRESSURE: 140 MMHG | HEIGHT: 65 IN | DIASTOLIC BLOOD PRESSURE: 70 MMHG

## 2021-05-25 DIAGNOSIS — Z51.81 ENCOUNTER FOR THERAPEUTIC DRUG LEVEL MONITORING: ICD-10-CM

## 2021-05-25 DIAGNOSIS — I10 UNCONTROLLED HYPERTENSION: Primary | ICD-10-CM

## 2021-05-25 DIAGNOSIS — R06.09 DOE (DYSPNEA ON EXERTION): ICD-10-CM

## 2021-05-25 DIAGNOSIS — I10 UNCONTROLLED HYPERTENSION: ICD-10-CM

## 2021-05-25 DIAGNOSIS — I10 ESSENTIAL HYPERTENSION: ICD-10-CM

## 2021-05-25 DIAGNOSIS — E78.5 HYPERLIPIDEMIA, UNSPECIFIED HYPERLIPIDEMIA TYPE: ICD-10-CM

## 2021-05-25 DIAGNOSIS — I25.10 CORONARY ARTERY DISEASE INVOLVING NATIVE CORONARY ARTERY OF NATIVE HEART WITHOUT ANGINA PECTORIS: ICD-10-CM

## 2021-05-25 LAB
ALBUMIN SERPL-MCNC: 4.2 G/DL (ref 3.5–5.2)
ALBUMIN/GLOB SERPL: 1.2 G/DL
ALP SERPL-CCNC: 93 U/L (ref 39–117)
ALT SERPL W P-5'-P-CCNC: 19 U/L (ref 1–33)
ANION GAP SERPL CALCULATED.3IONS-SCNC: 10.5 MMOL/L (ref 5–15)
AST SERPL-CCNC: 16 U/L (ref 1–32)
BILIRUB SERPL-MCNC: 0.2 MG/DL (ref 0–1.2)
BUN SERPL-MCNC: 12 MG/DL (ref 6–20)
BUN/CREAT SERPL: 22.6 (ref 7–25)
CALCIUM SPEC-SCNC: 9.1 MG/DL (ref 8.6–10.5)
CHLORIDE SERPL-SCNC: 104 MMOL/L (ref 98–107)
CO2 SERPL-SCNC: 25.5 MMOL/L (ref 22–29)
CREAT SERPL-MCNC: 0.53 MG/DL (ref 0.57–1)
GFR SERPL CREATININE-BSD FRML MDRD: 144 ML/MIN/1.73
GLOBULIN UR ELPH-MCNC: 3.4 GM/DL
GLUCOSE SERPL-MCNC: 90 MG/DL (ref 65–99)
NT-PROBNP SERPL-MCNC: 187.4 PG/ML (ref 0–900)
POTASSIUM SERPL-SCNC: 4.1 MMOL/L (ref 3.5–5.2)
PROT SERPL-MCNC: 7.6 G/DL (ref 6–8.5)
SODIUM SERPL-SCNC: 140 MMOL/L (ref 136–145)

## 2021-05-25 PROCEDURE — 80053 COMPREHEN METABOLIC PANEL: CPT | Performed by: NURSE PRACTITIONER

## 2021-05-25 PROCEDURE — 83880 ASSAY OF NATRIURETIC PEPTIDE: CPT | Performed by: NURSE PRACTITIONER

## 2021-05-25 PROCEDURE — 93000 ELECTROCARDIOGRAM COMPLETE: CPT | Performed by: NURSE PRACTITIONER

## 2021-05-25 PROCEDURE — 36415 COLL VENOUS BLD VENIPUNCTURE: CPT

## 2021-05-25 PROCEDURE — 99214 OFFICE O/P EST MOD 30 MIN: CPT | Performed by: NURSE PRACTITIONER

## 2021-05-25 NOTE — TELEPHONE ENCOUNTER
Please call Mohit to obtain copy of prior holter monitor/ 30 day event monitor. The report is not available in care everywhere

## 2021-05-25 NOTE — TELEPHONE ENCOUNTER
Results and recommendations reviewed with the patient. Patient verbalized understanding. Denied further questions at this time.    Maureen Winston RN  Triage [facility]

## 2021-05-25 NOTE — PROGRESS NOTES
"Date of Office Visit: 21  Encounter Provider: ANASTASIIA Heredia  Place of Service: [facility] CARDIOLOGY  Patient Name: Henny James  :1960    Chief Complaint   Patient presents with   • Coronary artery disease involving native coronary artery of    • Hyperlipidemia, unspecified hyperlipidemia type   • Follow-up   :     HPI: Henny James is a 60 y.o. female  with history of hypertensive urgency, hyperlipidemia, palpitation, stroke, paroxysmal atrial fibrillation, and is post resection of gastrointestinal stromal tumor, and nonobstructive coronary artery disease.  Intolerance to Lipitor.      She is followed by Dr. Rodriguez. I will visit with her for the first time today and have reviewed her medical record.   She reports history of a blood clot in her heart.  She was told she had atrial fibrillation and was on warfarin temporarily but requested to come off of it due to frequent fingersticks so she agreed to stay on aspirin.      She has history of hypertensive urgency/resistant hypertension.  In 2020 she presented with strokelike symptoms and was evaluated by neurology who favored mild hypertension encephalopathy versus migraine. Per neurology \"Chart review reflects that patient was noncompliant with hypertensive medications recently due to insurance coverage.  Initial CT of the head was negative for acute findings.  There was evidence of extensive small vessel ischemic disease noted along with remote cerebellar infarcts seen.  CTA of the head and neck showed no acute infarct/hemorrhage/dissection; unremarkable. Patient declined MRI due to claustrophobia. \"  In 2020 she was started on spironolactone 25 mg daily in addition to amlodipine 10 mg/day, losartan 100 mg/day, metoprolol succinate 100 mg/day.  In 2020 hydralazine was added to her regimen.    She now presents with multiple complaints.  Since she had Covid in 2020 she has had " "increased weight gain, heart fluttering and fatigue.  She does not like the metoprolol or spironolactone she is on.  Metoprolol was previously decreased due to reported weight gain.  She recently saw her PCP who did some labs due to severe fatigue.  She had a neck sleep study.  This did not show obstructive sleep apnea however she had mild REM related apneic events.  CPAP or BiPAP was not recommended.  She occasionally ride stationary bike and does some strengthening exercises but that is very infrequent.  She denies near-syncope or syncope.    Allergies   Allergen Reactions   • Spironolactone Other (See Comments)     fatigue           Family and social history reviewed.     ROS  All other systems were reviewed and are negative          Objective:     Vitals:    05/25/21 0849   BP: 140/70   BP Location: Left arm   Patient Position: Sitting   Pulse: 56   Weight: 111 kg (245 lb)   Height: 165.1 cm (65\")     Body mass index is 40.77 kg/m².    PHYSICAL EXAM:  Constitutional:       General: Not in acute distress.     Appearance: Well-developed. Not diaphoretic.   HENT:      Head: Normocephalic.   Pulmonary:      Effort: Pulmonary effort is normal. No respiratory distress.      Breath sounds: Normal breath sounds. No wheezing. No rhonchi. No rales.   Cardiovascular:      Normal rate. Regular rhythm.   Pulses:     Radial: 2+ bilaterally.  Skin:     General: Skin is warm and dry. There is no cyanosis.      Findings: No rash.   Neurological:      Mental Status: Alert and oriented to person, place, and time.   Psychiatric:         Behavior: Behavior normal.         Thought Content: Thought content normal.         Judgment: Judgment normal.           ECG 12 Lead    Date/Time: 5/25/2021 9:13 AM  Performed by: Danya Baptiste APRN  Authorized by: Danya Baptiste APRN   Comparison: compared with previous ECG   Similar to previous ECG  Rhythm: sinus rhythm  Rate: normal  QRS axis: normal  Comments: No ischemic " jayesh              Current Outpatient Medications   Medication Sig Dispense Refill   • ALPRAZolam (XANAX) 1 MG tablet Take 1 mg by mouth 3 (Three) Times a Day As Needed for Anxiety.     • amLODIPine (NORVASC) 10 MG tablet Take 1 tablet by mouth Daily. 30 tablet 1   • amLODIPine (NORVASC) 5 MG tablet TAKE ONE TABLET BY MOUTH DAILY 30 tablet 0   • ARIPiprazole (ABILIFY) 10 MG tablet Take 10 mg by mouth Every Night.     • aspirin 81 MG chewable tablet Chew 81 mg Daily.     • Cyanocobalamin (B-12 PO) Take  by mouth.     • hydrALAZINE (APRESOLINE) 25 MG tablet Take 1 tablet by mouth 3 (Three) Times a Day. 90 tablet 3   • lamoTRIgine (LaMICtal) 200 MG tablet Take 200 mg by mouth Every Night.     • losartan (COZAAR) 100 MG tablet TAKE ONE TABLET BY MOUTH DAILY 90 tablet 0   • MELATONIN PO Take  by mouth As Needed.     • metoprolol succinate XL (TOPROL-XL) 100 MG 24 hr tablet Take 1 tablet by mouth Daily. 90 tablet 1   • naproxen (NAPROSYN) 500 MG tablet Take 1 tablet by mouth 2 (Two) Times a Day As Needed for Moderate Pain . 30 tablet 0   • VITAMIN E PO Take  by mouth.       No current facility-administered medications for this visit.     Assessment:       Diagnosis Plan   1. Uncontrolled hypertension  ECG 12 Lead    Comprehensive Metabolic Panel   2. Coronary artery disease involving native coronary artery of native heart without angina pectoris     3. Essential hypertension     4. Hyperlipidemia, unspecified hyperlipidemia type     5. RAMOS (dyspnea on exertion)  proBNP    Adult Transthoracic Echo Complete W/ Cont if Necessary Per Protocol   6. Encounter for therapeutic drug level monitoring  Comprehensive Metabolic Panel        Orders Placed This Encounter   Procedures   • Comprehensive Metabolic Panel     Standing Status:   Future     Number of Occurrences:   1     Standing Expiration Date:   5/26/2022     Order Specific Question:   Release to patient     Answer:   Immediate   • proBNP     Order Specific Question:    "Release to patient     Answer:   Immediate   • ECG 12 Lead     This order was created via procedure documentation     Order Specific Question:   Release to patient     Answer:   Immediate   • Adult Transthoracic Echo Complete W/ Cont if Necessary Per Protocol     Standing Status:   Future     Standing Expiration Date:   5/25/2022     Order Specific Question:   Reason for exam?     Answer:   Dyspnea         Plan:   1.  60-year-old female with resistant hypertension-blood pressure today actually is not bad however she is very adamant she wants to come off spironolactone due to severe fatigue from that.  Ambien added to her medication list and for now she will stay on losartan 100 mg daily, amlodipine 10 mg daily, hydralazine 25 mg 3 times a day and metoprolol succinate 100 mg a day.  She is concerned metoprolol is still causing fatigue and weight gain.  She understands at this time to continue  2.  Hyperlipidemia.  She did not tolerate atorvastatin in the past.  She was treated try Crestor.  3. Prediabetes  4.  History of left atrial appendage thrombus 2013 which was treated with with warfarin for 1 year.  5.  History of remote cerebellar infarct and small vessel cerebral artery disease on cerebral imaging August 2020  6.  Reported history of paroxysmal atrial fibrillation previously on warfarin as above but now only on aspirin-reviewed care everywhere but report to 30-day event monitor in 2019 not available to me.  I will call to get those records  7.  History of \"stomach cancer\" she reports having a resection 9 or 10 years ago  8.  COVID-19 infection October 2020-she has some symptoms of fluttering and weight gain and fatigue-reviewed most recent labs.  I am going to obtain proBNP and CMP today.  She is to return for echocardiogram.  9.  Reported history of nonobstructive coronary artery disease on prior CAT-I do not have those records available at this time        It has been a pleasure to participate in this " patient's care.      Thank you,  ANASTASIIA Heredia      **I used Dragon to dictate this note:**

## 2021-05-25 NOTE — TELEPHONE ENCOUNTER
Please let patient know her labs today look good.  She is not in heart failure and kidney function looks good.  Please remind her to check her blood pressure once daily between now and her echo.  Let her know I will call her within a few days after her echo.

## 2021-05-25 NOTE — TELEPHONE ENCOUNTER
Called and left a message asking patient to call be back with which [place] Location she was seen. /PRASANNA

## 2021-05-31 DIAGNOSIS — G89.29 CHRONIC PAIN OF BOTH KNEES: ICD-10-CM

## 2021-05-31 DIAGNOSIS — M25.562 CHRONIC PAIN OF BOTH KNEES: ICD-10-CM

## 2021-05-31 DIAGNOSIS — M17.0 OSTEOARTHRITIS OF BOTH KNEES, UNSPECIFIED OSTEOARTHRITIS TYPE: ICD-10-CM

## 2021-05-31 DIAGNOSIS — M25.561 CHRONIC PAIN OF BOTH KNEES: ICD-10-CM

## 2021-06-04 RX ORDER — NAPROXEN 500 MG/1
TABLET ORAL
Qty: 30 TABLET | Refills: 0 | OUTPATIENT
Start: 2021-06-04

## 2021-06-07 ENCOUNTER — HOSPITAL ENCOUNTER (OUTPATIENT)
Dept: CARDIOLOGY | Facility: HOSPITAL | Age: 61
Discharge: HOME | End: 2021-06-07
Admitting: NURSE PRACTITIONER

## 2021-06-07 ENCOUNTER — TELEPHONE (OUTPATIENT)
Dept: FAMILY MEDICINE CLINIC | Facility: CLINIC | Age: 61
End: 2021-06-07

## 2021-06-07 VITALS
DIASTOLIC BLOOD PRESSURE: 70 MMHG | SYSTOLIC BLOOD PRESSURE: 120 MMHG | HEART RATE: 67 BPM | WEIGHT: 245 LBS | HEIGHT: 65 IN | BODY MASS INDEX: 40.82 KG/M2

## 2021-06-07 DIAGNOSIS — M25.562 CHRONIC PAIN OF BOTH KNEES: ICD-10-CM

## 2021-06-07 DIAGNOSIS — R06.09 DOE (DYSPNEA ON EXERTION): ICD-10-CM

## 2021-06-07 DIAGNOSIS — M25.561 CHRONIC PAIN OF BOTH KNEES: ICD-10-CM

## 2021-06-07 DIAGNOSIS — M17.0 OSTEOARTHRITIS OF BOTH KNEES, UNSPECIFIED OSTEOARTHRITIS TYPE: ICD-10-CM

## 2021-06-07 DIAGNOSIS — G89.29 CHRONIC PAIN OF BOTH KNEES: ICD-10-CM

## 2021-06-07 LAB
AORTIC ARCH: 2.1 CM
ASCENDING AORTA: 3.1 CM
BH CV ECHO MEAS - ACS: 1.8 CM
BH CV ECHO MEAS - AI DEC SLOPE: 189 CM/SEC^2
BH CV ECHO MEAS - AI MAX PG: 109 MMHG
BH CV ECHO MEAS - AI MAX VEL: 522 CM/SEC
BH CV ECHO MEAS - AI P1/2T: 808.9 MSEC
BH CV ECHO MEAS - AO MAX PG (FULL): 8.1 MMHG
BH CV ECHO MEAS - AO MAX PG: 10.8 MMHG
BH CV ECHO MEAS - AO MEAN PG (FULL): 4 MMHG
BH CV ECHO MEAS - AO MEAN PG: 6 MMHG
BH CV ECHO MEAS - AO ROOT AREA (BSA CORRECTED): 1.4
BH CV ECHO MEAS - AO ROOT AREA: 7.5 CM^2
BH CV ECHO MEAS - AO ROOT DIAM: 3.1 CM
BH CV ECHO MEAS - AO V2 MAX: 164 CM/SEC
BH CV ECHO MEAS - AO V2 MEAN: 118 CM/SEC
BH CV ECHO MEAS - AO V2 VTI: 32.4 CM
BH CV ECHO MEAS - ASC AORTA: 3.1 CM
BH CV ECHO MEAS - AVA(I,A): 1.6 CM^2
BH CV ECHO MEAS - AVA(I,D): 1.6 CM^2
BH CV ECHO MEAS - AVA(V,A): 1.6 CM^2
BH CV ECHO MEAS - AVA(V,D): 1.6 CM^2
BH CV ECHO MEAS - BSA(HAYCOCK): 2.3 M^2
BH CV ECHO MEAS - BSA: 2.2 M^2
BH CV ECHO MEAS - BZI_BMI: 40.8 KILOGRAMS/M^2
BH CV ECHO MEAS - BZI_METRIC_HEIGHT: 165.1 CM
BH CV ECHO MEAS - BZI_METRIC_WEIGHT: 111.1 KG
BH CV ECHO MEAS - EDV(MOD-SP2): 121 ML
BH CV ECHO MEAS - EDV(MOD-SP4): 76 ML
BH CV ECHO MEAS - EDV(TEICH): 83.1 ML
BH CV ECHO MEAS - EF(CUBED): 66 %
BH CV ECHO MEAS - EF(MOD-BP): 62 %
BH CV ECHO MEAS - EF(MOD-SP2): 65.3 %
BH CV ECHO MEAS - EF(MOD-SP4): 61.8 %
BH CV ECHO MEAS - EF(TEICH): 57.9 %
BH CV ECHO MEAS - ESV(MOD-SP2): 42 ML
BH CV ECHO MEAS - ESV(MOD-SP4): 29 ML
BH CV ECHO MEAS - ESV(TEICH): 35 ML
BH CV ECHO MEAS - FS: 30.2 %
BH CV ECHO MEAS - IVS/LVPW: 1.1
BH CV ECHO MEAS - IVSD: 1.4 CM
BH CV ECHO MEAS - LAT PEAK E' VEL: 6.3 CM/SEC
BH CV ECHO MEAS - LV DIASTOLIC VOL/BSA (35-75): 35.2 ML/M^2
BH CV ECHO MEAS - LV MASS(C)D: 219.8 GRAMS
BH CV ECHO MEAS - LV MASS(C)DI: 101.9 GRAMS/M^2
BH CV ECHO MEAS - LV MAX PG: 2.7 MMHG
BH CV ECHO MEAS - LV MEAN PG: 2 MMHG
BH CV ECHO MEAS - LV SYSTOLIC VOL/BSA (12-30): 13.4 ML/M^2
BH CV ECHO MEAS - LV V1 MAX: 82.2 CM/SEC
BH CV ECHO MEAS - LV V1 MEAN: 59.8 CM/SEC
BH CV ECHO MEAS - LV V1 VTI: 17 CM
BH CV ECHO MEAS - LVIDD: 4.3 CM
BH CV ECHO MEAS - LVIDS: 3 CM
BH CV ECHO MEAS - LVLD AP2: 8.3 CM
BH CV ECHO MEAS - LVLD AP4: 7.9 CM
BH CV ECHO MEAS - LVLS AP2: 7 CM
BH CV ECHO MEAS - LVLS AP4: 6.2 CM
BH CV ECHO MEAS - LVOT AREA (M): 3.1 CM^2
BH CV ECHO MEAS - LVOT AREA: 3.1 CM^2
BH CV ECHO MEAS - LVOT DIAM: 2 CM
BH CV ECHO MEAS - LVPWD: 1.3 CM
BH CV ECHO MEAS - MED PEAK E' VEL: 5 CM/SEC
BH CV ECHO MEAS - MR MAX PG: 85.7 MMHG
BH CV ECHO MEAS - MR MAX VEL: 463 CM/SEC
BH CV ECHO MEAS - MV A DUR: 0.15 SEC
BH CV ECHO MEAS - MV A MAX VEL: 95.1 CM/SEC
BH CV ECHO MEAS - MV DEC SLOPE: 298 CM/SEC^2
BH CV ECHO MEAS - MV DEC TIME: 0.34 SEC
BH CV ECHO MEAS - MV E MAX VEL: 58.3 CM/SEC
BH CV ECHO MEAS - MV E/A: 0.61
BH CV ECHO MEAS - MV MAX PG: 3.6 MMHG
BH CV ECHO MEAS - MV MEAN PG: 1 MMHG
BH CV ECHO MEAS - MV P1/2T MAX VEL: 77.4 CM/SEC
BH CV ECHO MEAS - MV P1/2T: 76.1 MSEC
BH CV ECHO MEAS - MV V2 MAX: 94.7 CM/SEC
BH CV ECHO MEAS - MV V2 MEAN: 46.9 CM/SEC
BH CV ECHO MEAS - MV V2 VTI: 25.3 CM
BH CV ECHO MEAS - MVA P1/2T LCG: 2.8 CM^2
BH CV ECHO MEAS - MVA(P1/2T): 2.9 CM^2
BH CV ECHO MEAS - MVA(VTI): 2.1 CM^2
BH CV ECHO MEAS - PA ACC TIME: 0.16 SEC
BH CV ECHO MEAS - PA MAX PG (FULL): 1.2 MMHG
BH CV ECHO MEAS - PA MAX PG: 2.2 MMHG
BH CV ECHO MEAS - PA PR(ACCEL): 6.6 MMHG
BH CV ECHO MEAS - PA V2 MAX: 74.7 CM/SEC
BH CV ECHO MEAS - PULM A REVS DUR: 0.11 SEC
BH CV ECHO MEAS - PULM A REVS VEL: 27 CM/SEC
BH CV ECHO MEAS - PULM DIAS VEL: 48.8 CM/SEC
BH CV ECHO MEAS - PULM S/D: 0.73
BH CV ECHO MEAS - PULM SYS VEL: 35.6 CM/SEC
BH CV ECHO MEAS - PVA(V,A): 2.2 CM^2
BH CV ECHO MEAS - PVA(V,D): 2.2 CM^2
BH CV ECHO MEAS - QP/QS: 0.75
BH CV ECHO MEAS - RAP SYSTOLE: 3 MMHG
BH CV ECHO MEAS - RV MAX PG: 1.1 MMHG
BH CV ECHO MEAS - RV MEAN PG: 1 MMHG
BH CV ECHO MEAS - RV V1 MAX: 51.6 CM/SEC
BH CV ECHO MEAS - RV V1 MEAN: 39.7 CM/SEC
BH CV ECHO MEAS - RV V1 VTI: 12.8 CM
BH CV ECHO MEAS - RVOT AREA: 3.1 CM^2
BH CV ECHO MEAS - RVOT DIAM: 2 CM
BH CV ECHO MEAS - RVSP: 21.3 MMHG
BH CV ECHO MEAS - SI(AO): 113.4 ML/M^2
BH CV ECHO MEAS - SI(CUBED): 24.4 ML/M^2
BH CV ECHO MEAS - SI(LVOT): 24.8 ML/M^2
BH CV ECHO MEAS - SI(MOD-SP2): 36.6 ML/M^2
BH CV ECHO MEAS - SI(MOD-SP4): 21.8 ML/M^2
BH CV ECHO MEAS - SI(TEICH): 22.3 ML/M^2
BH CV ECHO MEAS - SUP REN AO DIAM: 2 CM
BH CV ECHO MEAS - SV(AO): 244.5 ML
BH CV ECHO MEAS - SV(CUBED): 52.5 ML
BH CV ECHO MEAS - SV(LVOT): 53.4 ML
BH CV ECHO MEAS - SV(MOD-SP2): 79 ML
BH CV ECHO MEAS - SV(MOD-SP4): 47 ML
BH CV ECHO MEAS - SV(RVOT): 40.2 ML
BH CV ECHO MEAS - SV(TEICH): 48.1 ML
BH CV ECHO MEAS - TAPSE (>1.6): 2.1 CM
BH CV ECHO MEAS - TR MAX VEL: 214 CM/SEC
BH CV ECHO MEASUREMENTS AVERAGE E/E' RATIO: 10.32
BH CV XLRA - RV BASE: 3 CM
BH CV XLRA - RV LENGTH: 7.3 CM
BH CV XLRA - RV MID: 2.2 CM
BH CV XLRA - TDI S': 15.1 CM/SEC
LEFT ATRIUM VOLUME INDEX: 23 ML/M2
LV EF 2D ECHO EST: 65 %
MAXIMAL PREDICTED HEART RATE: 162 BPM
SINUS: 2.8 CM
STJ: 2.3 CM
STRESS TARGET HR: 138 BPM

## 2021-06-07 PROCEDURE — 93356 MYOCRD STRAIN IMG SPCKL TRCK: CPT

## 2021-06-07 PROCEDURE — 93306 TTE W/DOPPLER COMPLETE: CPT | Performed by: INTERNAL MEDICINE

## 2021-06-07 PROCEDURE — 93306 TTE W/DOPPLER COMPLETE: CPT

## 2021-06-07 PROCEDURE — 93356 MYOCRD STRAIN IMG SPCKL TRCK: CPT | Performed by: INTERNAL MEDICINE

## 2021-06-07 NOTE — TELEPHONE ENCOUNTER
Caller: Henny James    Relationship: Self    Best call back number: [phone]    Medication needed:   Requested Prescriptions     Pending Prescriptions Disp Refills   • naproxen (NAPROSYN) 500 MG tablet 30 tablet 0     Sig: Take 1 tablet by mouth 2 (Two) Times a Day As Needed for Moderate Pain .       When do you need the refill by: ASAP     What additional details did the patient provide when requesting the medication: PATIENT IS OUT OF MEDICATION   Does the patient have less than a 3 day supply:  [x] Yes  [] No    What is the patient's preferred pharmacy: [facility] [address] [place] [address] AT [facility] [phone] [facility] [phone] FX

## 2021-06-07 NOTE — TELEPHONE ENCOUNTER
She needs okay from cardiologist to be on this medication.  She has uncontrolled hypertension.  NSAIDs like naproxen would increase her cardiovascular risk.  Therefore yes advise her to get an okay to be taking this from cardiologist; needs to be noted in her record.

## 2021-06-07 NOTE — TELEPHONE ENCOUNTER
Pt states she saw her cardiologist today, and her BP was 120/70.  States she has been on this medicine for 5 years, and has not had any cardiac problems with this, and her cardiologist reviews her medicines with her when she goes as they did today.  Wants to know if this can be refilled otherwise she will have to find her a new provider.

## 2021-06-08 RX ORDER — NAPROXEN 500 MG/1
500 TABLET ORAL 2 TIMES DAILY PRN
Qty: 30 TABLET | Refills: 0 | Status: SHIPPED | OUTPATIENT
Start: 2021-06-08 | End: 2021-08-10

## 2021-06-08 NOTE — TELEPHONE ENCOUNTER
Now that her blood pressure is doing better it is safer for her to take naproxen.  However please remind patient it is my job to make sure medications which I prescribed to her are safe for her to take especially with a cardiac history such as hers.  Also remind her as she gets older NSAIDs will not be considered safe for her especially if she is taking them daily.  I will now place refill order.

## 2021-06-28 DIAGNOSIS — I10 ESSENTIAL HYPERTENSION: ICD-10-CM

## 2021-06-28 RX ORDER — AMLODIPINE BESYLATE 5 MG/1
TABLET ORAL
Qty: 30 TABLET | Refills: 0 | Status: SHIPPED | OUTPATIENT
Start: 2021-06-28 | End: 2021-08-10 | Stop reason: SDUPTHER

## 2021-07-28 DIAGNOSIS — I10 ESSENTIAL HYPERTENSION: ICD-10-CM

## 2021-07-28 RX ORDER — METOPROLOL SUCCINATE 100 MG/1
100 TABLET, EXTENDED RELEASE ORAL DAILY
Qty: 60 TABLET | Refills: 2 | Status: SHIPPED | OUTPATIENT
Start: 2021-07-28 | End: 2021-08-10

## 2021-08-04 ENCOUNTER — TELEPHONE (OUTPATIENT)
Dept: FAMILY MEDICINE CLINIC | Facility: CLINIC | Age: 61
End: 2021-08-04

## 2021-08-04 RX ORDER — AMLODIPINE BESYLATE 10 MG/1
10 TABLET ORAL DAILY
Qty: 15 TABLET | Refills: 0 | Status: SHIPPED | OUTPATIENT
Start: 2021-08-04 | End: 2022-02-28

## 2021-08-04 NOTE — TELEPHONE ENCOUNTER
Caller: Henny James    Relationship: Self    Best call back number: [phone]    Medication needed:   amLODIPine (NORVASC) 10 MG tablet    When do you need the refill by: 8/4/21    What additional details did the patient provide when requesting the medication: PATIENT IS OUT OF MEDICATION    Does the patient have less than a 3 day supply:  [x] Yes  [] No    What is the patient's preferred pharmacy: [facility] [address] [place] [address] [phone] [facility] [phone]

## 2021-08-10 ENCOUNTER — OFFICE VISIT (OUTPATIENT)
Dept: FAMILY MEDICINE CLINIC | Facility: CLINIC | Age: 61
End: 2021-08-10

## 2021-08-10 ENCOUNTER — TELEPHONE (OUTPATIENT)
Dept: CARDIOLOGY | Facility: CLINIC | Age: 61
End: 2021-08-10

## 2021-08-10 DIAGNOSIS — R41.3 MEMORY CHANGES: ICD-10-CM

## 2021-08-10 DIAGNOSIS — Z86.16 HISTORY OF COVID-19: ICD-10-CM

## 2021-08-10 DIAGNOSIS — I10 UNCONTROLLED HYPERTENSION: Primary | ICD-10-CM

## 2021-08-10 DIAGNOSIS — N93.9 ABNORMAL UTERINE BLEEDING: ICD-10-CM

## 2021-08-10 DIAGNOSIS — I10 ESSENTIAL HYPERTENSION: ICD-10-CM

## 2021-08-10 PROCEDURE — 99442 PR PHYS/QHP TELEPHONE EVALUATION 11-20 MIN: CPT | Performed by: FAMILY MEDICINE

## 2021-08-10 RX ORDER — AMLODIPINE BESYLATE 5 MG/1
5 TABLET ORAL DAILY
Qty: 30 TABLET | Refills: 5 | Status: SHIPPED | OUTPATIENT
Start: 2021-08-10 | End: 2021-10-06 | Stop reason: DRUGHIGH

## 2021-08-10 RX ORDER — MELOXICAM 15 MG/1
TABLET ORAL
COMMUNITY
Start: 2021-08-04 | End: 2021-10-06

## 2021-08-10 RX ORDER — TRAZODONE HYDROCHLORIDE 50 MG/1
TABLET ORAL
COMMUNITY
Start: 2021-08-06

## 2021-08-10 RX ORDER — CARVEDILOL 25 MG/1
25 TABLET ORAL 2 TIMES DAILY
Qty: 60 TABLET | Refills: 8 | Status: SHIPPED | OUTPATIENT
Start: 2021-08-10 | End: 2022-11-03

## 2021-08-10 NOTE — TELEPHONE ENCOUNTER
The patient called and would like to see if there is something other than metoprolol she can take that does not cause weight gain?

## 2021-08-10 NOTE — TELEPHONE ENCOUNTER
Called and spoke with patient.  Will discontinue metoprolol succinate due to weight gain side effect.  Discussed trying alternative beta-blocker such as carvedilol that may have less of that side effect.  She is agreeable.

## 2021-08-10 NOTE — PROGRESS NOTES
Henny PEREZ Daisetta    1960  [id number]  Time spent reviewing E-Visit Questionnaire-5-10 minutes.  I have reviewed the e-Visit questionnaire and patient's answers, my assessment and plan documented below.    HPI    This visit was completed via telephone due to the restriction of the COVID-19 pandemic.  All issues as below were discussed and addressed but no physical exam was performed.  It was felt that the patient should be evaluated in clinic and they were directed there.  The patient verbally consented to visit.    61-year-old female presents today to follow-up on hypertension uncontrolled; needs refill on amlodipine.    Last seen by cardiology nurse practitioner at the end of May.  Advised to stay on losartan 100 mg a day amlodipine 10 mg a day hydralazine 25 mg 3 times daily metoprolol succinate 100 mg a day.  Advised to return for echocardiogram which was normal.  Wants to discuss getting off of metoprolol; advise she is to follow-up with her cardiologist about this.    Memory changes: Reports continued brain fog since COVID-19 infection in 2020.  Discussed this can take months or longer to improve; would like to see a neurologist.    Abnormal uterine bleedin week of spotting.  Reports that 2 to 3 months ago she was seen by her gynecologist at [facility]; was told to come back if she had recurring of spotting.  Would like to see [facility] gynecologist.  Does not recall getting any transvaginal ultrasound.  I told patient last record I see if her seeing a gynecologist was in .  Patient was not sure.  Amenable to transvaginal ultrasound and further work-up with gynecology.    Diagnoses and all orders for this visit:    1. Uncontrolled hypertension (Primary)    2. Abnormal uterine bleeding  -     US Non-ob Transvaginal; Future  -     Ambulatory Referral to Gynecology    3. Essential hypertension  -     amLODIPine (NORVASC) 5 MG tablet; Take 1 tablet by mouth Daily.  Dispense: 30 tablet; Refill: 5    4.  Memory changes  -     Ambulatory Referral to Neurology    5. History of COVID-19  -     Ambulatory Referral to Neurology    Continue care per cardiology.    Time spent during visit: 15 minutes.    Any medications prescribed have been sent electronically to   [address] - [address] [place] [address] AT [facility] - [phone]  - [phone]   [address] [place] [id number]  Phone: [phone] Fax: [phone]        Cheng Parra MD  08/10/21  10:01 EDT

## 2021-08-25 ENCOUNTER — TELEPHONE (OUTPATIENT)
Dept: OBSTETRICS AND GYNECOLOGY | Facility: CLINIC | Age: 61
End: 2021-08-25

## 2021-08-25 NOTE — TELEPHONE ENCOUNTER
----- Message from Eusebia Carlos sent at 8/25/2021 10:35 AM EDT -----  I just called and left her a message to call me back. I am going to try to get her to come in at 12:30 to scan her before you see her, if she doesn't call back or can't come earlier we have a 3:00 ultrasound opening and can do her then. Hopefully she can come in early so you can have the results when you see her.    The only thing I need for you to do since she hasn't been seen here before is to put the order in for the ultrasound.    Thanks!  Eusebia      ----- Message -----  From: Kayla Renteria MD  Sent: 8/25/2021   7:59 AM EDT  To: Shannan Hicks,  This patient is seeing me today as a new GYN. It appears they ordered and scheduled her for an US with Radiology on 8/30 for AUB- possibly postmenopausal bleeding.  Would you all mind to ultrasound her today? I'm happy to see her before if you prefer due to the policy, but wanted to see if there is space. Thanks!

## 2021-08-26 DIAGNOSIS — N93.9 ABNORMAL UTERINE BLEEDING: Primary | ICD-10-CM

## 2021-08-27 ENCOUNTER — TELEPHONE (OUTPATIENT)
Dept: OBSTETRICS AND GYNECOLOGY | Facility: CLINIC | Age: 61
End: 2021-08-27

## 2021-08-27 ENCOUNTER — TELEPHONE (OUTPATIENT)
Dept: NEUROLOGY | Facility: OTHER | Age: 61
End: 2021-08-27

## 2021-08-27 NOTE — TELEPHONE ENCOUNTER
PATIENT CALLED TO CANCEL 8-30-21 APPT. I LET HER KNOW SHE WOULD NEED TO CALL MAIN HOSPITAL LINE FOR THAT AS I WORK IN NEUROLOGY AND CAN'T CANCEL ULTRASOUND APPTS

## 2021-09-20 ENCOUNTER — TELEPHONE (OUTPATIENT)
Dept: CARDIOLOGY | Facility: CLINIC | Age: 61
End: 2021-09-20

## 2021-09-20 NOTE — TELEPHONE ENCOUNTER
"Pt is calling in requesting an apt for her high bp.    She doesn't think the recent med change is helping her, however she is not checking her bp at home \"her wrist cuff broke\"  She is explaining to me she has had a headache for about a week and she saw some floaters in her eyes earlier but they have since resolved.    She would feel better to just be seen to discuss this further with dr Rodriguez.  I have scheduled her for tomorrow   Thanks  Klarissa Gamez RN  Triage nurse    "

## 2021-09-21 ENCOUNTER — OFFICE VISIT (OUTPATIENT)
Dept: CARDIOLOGY | Facility: CLINIC | Age: 61
End: 2021-09-21

## 2021-09-21 ENCOUNTER — HOSPITAL ENCOUNTER (OUTPATIENT)
Dept: CARDIOLOGY | Facility: HOSPITAL | Age: 61
Discharge: HOME | End: 2021-09-21
Admitting: INTERNAL MEDICINE

## 2021-09-21 VITALS
BODY MASS INDEX: 39.15 KG/M2 | WEIGHT: 235 LBS | HEART RATE: 61 BPM | SYSTOLIC BLOOD PRESSURE: 180 MMHG | DIASTOLIC BLOOD PRESSURE: 115 MMHG | OXYGEN SATURATION: 99 % | HEIGHT: 65 IN

## 2021-09-21 VITALS — SYSTOLIC BLOOD PRESSURE: 150 MMHG | DIASTOLIC BLOOD PRESSURE: 84 MMHG

## 2021-09-21 DIAGNOSIS — I16.0 HYPERTENSIVE URGENCY: Primary | ICD-10-CM

## 2021-09-21 DIAGNOSIS — I25.10 CORONARY ARTERY DISEASE INVOLVING NATIVE CORONARY ARTERY OF NATIVE HEART WITHOUT ANGINA PECTORIS: ICD-10-CM

## 2021-09-21 DIAGNOSIS — I10 UNCONTROLLED HYPERTENSION: ICD-10-CM

## 2021-09-21 DIAGNOSIS — I48.21 PERMANENT ATRIAL FIBRILLATION (HCC): Primary | ICD-10-CM

## 2021-09-21 PROCEDURE — 36415 COLL VENOUS BLD VENIPUNCTURE: CPT

## 2021-09-21 PROCEDURE — 96374 THER/PROPH/DIAG INJ IV PUSH: CPT

## 2021-09-21 PROCEDURE — 25010000002 HYDRALAZINE PER 20 MG: Performed by: INTERNAL MEDICINE

## 2021-09-21 PROCEDURE — 99215 OFFICE O/P EST HI 40 MIN: CPT | Performed by: INTERNAL MEDICINE

## 2021-09-21 RX ORDER — HYDRALAZINE HYDROCHLORIDE 20 MG/ML
10 INJECTION INTRAMUSCULAR; INTRAVENOUS ONCE
Status: COMPLETED | OUTPATIENT
Start: 2021-09-21 | End: 2021-09-21

## 2021-09-21 RX ORDER — HYDRALAZINE HYDROCHLORIDE 50 MG/1
50 TABLET, FILM COATED ORAL 3 TIMES DAILY
Qty: 90 TABLET | Refills: 11 | Status: SHIPPED | OUTPATIENT
Start: 2021-09-21 | End: 2021-10-18 | Stop reason: DRUGHIGH

## 2021-09-21 RX ADMIN — HYDRALAZINE HYDROCHLORIDE 10 MG: 20 INJECTION INTRAMUSCULAR; INTRAVENOUS at 12:41

## 2021-09-21 NOTE — PROGRESS NOTES
"      CARDIOLOGY    Jorge Alberto Rodriguez MD    ENCOUNTER DATE:  09/21/2021    Henny James / 61 y.o. / female        CHIEF COMPLAINT / REASON FOR OFFICE VISIT     Hypertension  Coronary artery disease  Atrial fibrillation    HISTORY OF PRESENT ILLNESS       HPI  Henny James is a 61 y.o. female who presents today for evaluation.  Patient had COVID-19 back in March and April 2020.  Since then her blood pressure has been fluctuating significantly.  Patient was taken off some blood pressure medicine is supposed to have caused cancer.  Patient blood pressure has been very volatile since then.  Patient says that on Monday she was having a lot of issues.  She was having headaches and at one point she had some floaters in her eyes and for a minute or 2 she had some black spots.  She has not had that in the last 24 hours but she still feels poorly.  She did get a blood pressure machine brought in with her today.  Her blood pressure on her machine read 179/107 manually it was 180/115.  This morning it was as low as 148/94 she said yesterday it was running 203/103.      The following portions of the patient's history were reviewed and updated as appropriate: allergies, current medications, past family history, past medical history, past social history, past surgical history and problem list.      VITAL SIGNS     Visit Vitals  BP (!) 180/115 (BP Location: Left arm)   Pulse 61   Ht 165.1 cm (65\")   Wt 107 kg (235 lb)   SpO2 99%   BMI 39.11 kg/m²         Wt Readings from Last 3 Encounters:   09/21/21 107 kg (235 lb)   06/07/21 111 kg (245 lb)   05/25/21 111 kg (245 lb)     Body mass index is 39.11 kg/m².      REVIEW OF SYSTEMS   Review of Systems   All other systems reviewed and are negative.          PHYSICAL EXAMINATION     Vitals reviewed.   Constitutional:       Appearance: Not in distress.   Pulmonary:      Breath sounds: Normal breath sounds.   Cardiovascular:      Normal rate. Regular rhythm. Normal S1. Normal S2.      " Murmurs: There is no murmur.      No gallop. No click. No rub.   Pulses:     Intact distal pulses.   Edema:     Peripheral edema absent.   Neurological:      Mental Status: Alert and oriented to person, place and time.           REVIEWED DATA     Procedures    Cardiac Procedures:  1.           ASSESSMENT & PLAN      Diagnosis Plan   1. Permanent atrial fibrillation (CMS/HCC)     2. Uncontrolled hypertension  hydrALAZINE (APRESOLINE) 50 MG tablet   3. Coronary artery disease involving native coronary artery of native heart without angina pectoris           SUMMARY/DISCUSSION  1. Chronic atrial fibrillation.  2. Coronary artery disease stable  3. Hypertension this is actually very concerning because she has hypertensive emergency.  I placed her back on her CEC gave her 10 mg IV of hydralazine.  This brought her blood pressure down to 150/84 and she said she felt significantly better although she was just tired.  I increased her hydralazine to 50  3 times a day.  Patient will monitor her CEC clinically until she felt better.  4. Patient to be reassessed by my nurse practitioner on Monday if she has any further issues she is told to contact me as soon as possible.    Total time spent 42 minutes  MEDICATIONS         Discharge Medications          Accurate as of September 21, 2021  1:50 PM. If you have any questions, ask your nurse or doctor.            Changes to Medications      Instructions Start Date   hydrALAZINE 50 MG tablet  Commonly known as: APRESOLINE  What changed:   · medication strength  · how much to take  Changed by: Jorge Alberto Rodriguez MD   50 mg, Oral, 3 Times Daily         Continue These Medications      Instructions Start Date   amLODIPine 10 MG tablet  Commonly known as: NORVASC   10 mg, Oral, Daily      amLODIPine 5 MG tablet  Commonly known as: NORVASC   5 mg, Oral, Daily      ARIPiprazole 10 MG tablet  Commonly known as: ABILIFY   10 mg, Oral, Nightly      aspirin 81 MG chewable tablet   81 mg,  Oral, Daily      B-12 PO   Oral      carvedilol 25 MG tablet  Commonly known as: COREG   25 mg, Oral, 2 Times Daily      lamoTRIgine 200 MG tablet  Commonly known as: LaMICtal   200 mg, Oral, Nightly      losartan 100 MG tablet  Commonly known as: COZAAR   TAKE ONE TABLET BY MOUTH DAILY      meloxicam 15 MG tablet  Commonly known as: MOBIC   No dose, route, or frequency recorded.      traZODone 50 MG tablet  Commonly known as: DESYREL   No dose, route, or frequency recorded.      Xanax 1 MG tablet  Generic drug: ALPRAZolam   1 mg, Oral, 3 Times Daily PRN                 **Dragon Disclaimer:   Much of this encounter note is an electronic transcription/translation of spoken language to printed text. The electronic translation of spoken language may permit erroneous, or at times, nonsensical words or phrases to be inadvertently transcribed. Although I have reviewed the note for such errors, some may still exist.

## 2021-09-27 ENCOUNTER — HOSPITAL ENCOUNTER (OUTPATIENT)
Dept: CARDIOLOGY | Facility: HOSPITAL | Age: 61
Discharge: HOME | End: 2021-09-27
Admitting: NURSE PRACTITIONER

## 2021-09-27 ENCOUNTER — OFFICE VISIT (OUTPATIENT)
Dept: CARDIOLOGY | Facility: CLINIC | Age: 61
End: 2021-09-27

## 2021-09-27 VITALS
OXYGEN SATURATION: 99 % | SYSTOLIC BLOOD PRESSURE: 184 MMHG | HEART RATE: 66 BPM | BODY MASS INDEX: 39.15 KG/M2 | WEIGHT: 235 LBS | HEIGHT: 65 IN | DIASTOLIC BLOOD PRESSURE: 106 MMHG

## 2021-09-27 VITALS — OXYGEN SATURATION: 98 % | HEART RATE: 63 BPM | DIASTOLIC BLOOD PRESSURE: 88 MMHG | SYSTOLIC BLOOD PRESSURE: 157 MMHG

## 2021-09-27 DIAGNOSIS — I25.10 CORONARY ARTERY DISEASE INVOLVING NATIVE CORONARY ARTERY OF NATIVE HEART WITHOUT ANGINA PECTORIS: ICD-10-CM

## 2021-09-27 DIAGNOSIS — I16.0 HYPERTENSIVE URGENCY: Primary | ICD-10-CM

## 2021-09-27 PROCEDURE — 25010000002 HYDRALAZINE PER 20 MG: Performed by: NURSE PRACTITIONER

## 2021-09-27 PROCEDURE — 96374 THER/PROPH/DIAG INJ IV PUSH: CPT

## 2021-09-27 PROCEDURE — 36415 COLL VENOUS BLD VENIPUNCTURE: CPT

## 2021-09-27 PROCEDURE — 99214 OFFICE O/P EST MOD 30 MIN: CPT | Performed by: NURSE PRACTITIONER

## 2021-09-27 RX ORDER — HYDRALAZINE HYDROCHLORIDE 20 MG/ML
10 INJECTION INTRAMUSCULAR; INTRAVENOUS ONCE
Status: COMPLETED | OUTPATIENT
Start: 2021-09-27 | End: 2021-09-27

## 2021-09-27 RX ADMIN — HYDRALAZINE HYDROCHLORIDE 10 MG: 20 INJECTION INTRAMUSCULAR; INTRAVENOUS at 11:28

## 2021-09-27 NOTE — PROGRESS NOTES
"    CARDIOLOGY        Patient Name: Henny James  :1960  Age: 61 y.o.  Primary Cardiologist: Jorge Alberto Rodriguez MD  Encounter Provider:  ANASTASIIA Salcido    Date of Service: 21          CHIEF COMPLAINT / REASON FOR OFFICE VISIT     Hypertension (f/u per BH for HTN)      HISTORY OF PRESENT ILLNESS       HPI  Henny James is a 61 y.o. female who presents today for reevaluation of hypertension.     Pt has a  history significant for CAD, atrial fibrillation, hypertension, history of stroke.    Patient has been following with Dr. Rodriguez for uncontrolled hypertension.  At last evaluation her hydralazine dosing was increased.  Since that time patient's blood pressure has been averaging in the 180s/110s.  She reports that the best readings she has gotten is 157/85.  She does experience intermittent episodes of fatigue, lightheadedness, headache with elevations in her blood pressure.  She has tried spironolactone in the past and it has caused fatigue.  She has weight gain with beta-blockers.      The following portions of the patient's history were reviewed and updated as appropriate: allergies, current medications, past family history, past medical history, past social history, past surgical history and problem list.      VITAL SIGNS     Visit Vitals  BP (!) 184/106 (BP Location: Left arm, Patient Position: Sitting, Cuff Size: Large Adult)   Pulse 66   Ht 165.1 cm (65\")   Wt 107 kg (235 lb)   SpO2 99%   BMI 39.11 kg/m²         Wt Readings from Last 3 Encounters:   21 107 kg (235 lb)   21 107 kg (235 lb)   21 111 kg (245 lb)     Body mass index is 39.11 kg/m².      REVIEW OF SYSTEMS   Review of Systems   Constitutional: Positive for malaise/fatigue. Negative for chills, fever, weight gain and weight loss.   Cardiovascular: Negative for leg swelling.   Respiratory: Negative for cough, snoring and wheezing.    Hematologic/Lymphatic: Negative for bleeding problem. Does not bruise/bleed " easily.   Skin: Negative for color change.   Musculoskeletal: Negative for falls, joint pain and myalgias.   Gastrointestinal: Negative for melena.   Genitourinary: Negative for hematuria.   Neurological: Positive for headaches and light-headedness. Negative for excessive daytime sleepiness.   Psychiatric/Behavioral: Negative for depression. The patient is not nervous/anxious.            PHYSICAL EXAMINATION     Vitals and nursing note reviewed.   Constitutional:       Appearance: Normal appearance. Well-developed.   Eyes:      Conjunctiva/sclera: Conjunctivae normal.   Neck:      Vascular: No carotid bruit.   Pulmonary:      Breath sounds: Normal breath sounds.   Cardiovascular:      Normal rate. Regular rhythm. Normal S1 with normal intensity. Normal S2 with normal intensity.      Murmurs: There is no murmur.      No gallop. No click. No rub.   Edema:     Peripheral edema absent.   Musculoskeletal: Normal range of motion. Skin:     General: Skin is warm and dry.   Neurological:      Mental Status: Alert and oriented to person, place, and time.      GCS: GCS eye subscore is 4. GCS verbal subscore is 5. GCS motor subscore is 6.   Psychiatric:         Speech: Speech normal.         Behavior: Behavior normal.         Thought Content: Thought content normal.         Judgment: Judgment normal.           REVIEWED DATA     Procedures    Cardiac Procedures:  1. Echocardiogram 6/7/2021.  LVEF 65%.  Mild LVH.  Mild aortic valve regurgitation.  Grade 1 diastolic dysfunction.          ASSESSMENT & PLAN      Diagnosis Plan   1. Hypertensive urgency  hydrALAZINE (APRESOLINE) injection 10 mg   2. Coronary artery disease involving native coronary artery of native heart without angina pectoris           SUMMARY/DISCUSSION  1. Hypertensive urgency.  Patient's blood pressure is still not at goal and is markedly elevated today at 184/106.  Patient will go to the [facility] for a dose of IV hydralazine 10 mg x 1.  She will continue amlodipine  10 mg/day, carvedilol 25 mg twice per day, hydralazine 75 mg three times per day.  I recommended that we discontinue losartan and start patient on Edarbi 80 mg/day.  Patient has intolerance to spironolactone and beta-blockers in the past.  I think that Edarbi is the best option for this patient.  If this does not get her blood pressure at goal would consider adding chlorthalidone to her regimen.  2. History of CAD.  Currently denies any angina.  Continue aspirin.  3. Follow-up with me in 1 week for reevaluation of hypertension.        MEDICATIONS         Discharge Medications          Accurate as of September 27, 2021  3:43 PM. If you have any questions, ask your nurse or doctor.            New Medications      Instructions Start Date   azilsartan medoxomil 80 MG tablet tablet  Commonly known as: Edarbi  Started by: Kristi Powell APRN   80 mg, Oral, Daily         Changes to Medications      Instructions Start Date   hydrALAZINE 50 MG tablet  Commonly known as: APRESOLINE  What changed: how much to take   50 mg, Oral, 3 Times Daily         Continue These Medications      Instructions Start Date   amLODIPine 10 MG tablet  Commonly known as: NORVASC   10 mg, Oral, Daily      amLODIPine 5 MG tablet  Commonly known as: NORVASC   5 mg, Oral, Daily      ARIPiprazole 10 MG tablet  Commonly known as: ABILIFY   10 mg, Oral, Nightly      aspirin 81 MG chewable tablet   81 mg, Oral, Daily      B-12 PO   Oral      carvedilol 25 MG tablet  Commonly known as: COREG   25 mg, Oral, 2 Times Daily      lamoTRIgine 200 MG tablet  Commonly known as: LaMICtal   200 mg, Oral, Nightly      meloxicam 15 MG tablet  Commonly known as: MOBIC   No dose, route, or frequency recorded.      traZODone 50 MG tablet  Commonly known as: DESYREL   No dose, route, or frequency recorded.      Xanax 1 MG tablet  Generic drug: ALPRAZolam   1 mg, Oral, 3 Times Daily PRN         Stop These Medications    losartan 100 MG tablet  Commonly known as:  BERTHA  Stopped by: ANASTASIIA Salcido                **Dragon Disclaimer:   Much of this encounter note is an electronic transcription/translation of spoken language to printed text. The electronic translation of spoken language may permit erroneous, or at times, nonsensical words or phrases to be inadvertently transcribed. Although I have reviewed the note for such errors, some may still exist.

## 2021-10-05 ENCOUNTER — OFFICE VISIT (OUTPATIENT)
Dept: FAMILY MEDICINE CLINIC | Facility: CLINIC | Age: 61
End: 2021-10-05

## 2021-10-05 VITALS
HEIGHT: 65 IN | DIASTOLIC BLOOD PRESSURE: 94 MMHG | WEIGHT: 235 LBS | TEMPERATURE: 95.7 F | BODY MASS INDEX: 39.15 KG/M2 | RESPIRATION RATE: 16 BRPM | HEART RATE: 56 BPM | SYSTOLIC BLOOD PRESSURE: 182 MMHG | OXYGEN SATURATION: 98 %

## 2021-10-05 DIAGNOSIS — I10 UNCONTROLLED HYPERTENSION: Primary | ICD-10-CM

## 2021-10-05 PROCEDURE — 99213 OFFICE O/P EST LOW 20 MIN: CPT

## 2021-10-05 NOTE — PATIENT INSTRUCTIONS
"Hypertension, Adult  High blood pressure (hypertension) is when the force of blood pumping through the arteries is too strong. The arteries are the blood vessels that carry blood from the heart throughout the body. Hypertension forces the heart to work harder to pump blood and may cause arteries to become narrow or stiff. Untreated or uncontrolled hypertension can cause a heart attack, heart failure, a stroke, kidney disease, and other problems.  A blood pressure reading consists of a higher number over a lower number. Ideally, your blood pressure should be below 120/80. The first (\"top\") number is called the systolic pressure. It is a measure of the pressure in your arteries as your heart beats. The second (\"bottom\") number is called the diastolic pressure. It is a measure of the pressure in your arteries as the heart relaxes.  What are the causes?  The exact cause of this condition is not known. There are some conditions that result in or are related to high blood pressure.  What increases the risk?  Some risk factors for high blood pressure are under your control. The following factors may make you more likely to develop this condition:  · Smoking.  · Having type 2 diabetes mellitus, high cholesterol, or both.  · Not getting enough exercise or physical activity.  · Being overweight.  · Having too much fat, sugar, calories, or salt (sodium) in your diet.  · Drinking too much alcohol.  Some risk factors for high blood pressure may be difficult or impossible to change. Some of these factors include:  · Having chronic kidney disease.  · Having a family history of high blood pressure.  · Age. Risk increases with age.  · Race. You may be at higher risk if you are .  · Gender. Men are at higher risk than women before age 45. After age 65, women are at higher risk than men.  · Having obstructive sleep apnea.  · Stress.  What are the signs or symptoms?  High blood pressure may not cause symptoms. Very high " blood pressure (hypertensive crisis) may cause:  · Headache.  · Anxiety.  · Shortness of breath.  · Nosebleed.  · Nausea and vomiting.  · Vision changes.  · Severe chest pain.  · Seizures.  How is this diagnosed?  This condition is diagnosed by measuring your blood pressure while you are seated, with your arm resting on a flat surface, your legs uncrossed, and your feet flat on the floor. The cuff of the blood pressure monitor will be placed directly against the skin of your upper arm at the level of your heart. It should be measured at least twice using the same arm. Certain conditions can cause a difference in blood pressure between your right and left arms.  Certain factors can cause blood pressure readings to be lower or higher than normal for a short period of time:  · When your blood pressure is higher when you are in a health care provider's office than when you are at home, this is called white coat hypertension. Most people with this condition do not need medicines.  · When your blood pressure is higher at home than when you are in a health care provider's office, this is called masked hypertension. Most people with this condition may need medicines to control blood pressure.  If you have a high blood pressure reading during one visit or you have normal blood pressure with other risk factors, you may be asked to:  · Return on a different day to have your blood pressure checked again.  · Monitor your blood pressure at home for 1 week or longer.  If you are diagnosed with hypertension, you may have other blood or imaging tests to help your health care provider understand your overall risk for other conditions.  How is this treated?  This condition is treated by making healthy lifestyle changes, such as eating healthy foods, exercising more, and reducing your alcohol intake. Your health care provider may prescribe medicine if lifestyle changes are not enough to get your blood pressure under control, and  if:  · Your systolic blood pressure is above 130.  · Your diastolic blood pressure is above 80.  Your personal target blood pressure may vary depending on your medical conditions, your age, and other factors.  Follow these instructions at home:  Eating and drinking    · Eat a diet that is high in fiber and potassium, and low in sodium, added sugar, and fat. An example eating plan is called the DASH (Dietary Approaches to Stop Hypertension) diet. To eat this way:  ? Eat plenty of fresh fruits and vegetables. Try to fill one half of your plate at each meal with fruits and vegetables.  ? Eat whole grains, such as whole-wheat pasta, brown rice, or whole-grain bread. Fill about one fourth of your plate with whole grains.  ? Eat or drink low-fat dairy products, such as skim milk or low-fat yogurt.  ? Avoid fatty cuts of meat, processed or cured meats, and poultry with skin. Fill about one fourth of your plate with lean proteins, such as fish, chicken without skin, beans, eggs, or tofu.  ? Avoid pre-made and processed foods. These tend to be higher in sodium, added sugar, and fat.  · Reduce your daily sodium intake. Most people with hypertension should eat less than 1,500 mg of sodium a day.  · Do not drink alcohol if:  ? Your health care provider tells you not to drink.  ? You are pregnant, may be pregnant, or are planning to become pregnant.  · If you drink alcohol:  ? Limit how much you use to:  § 0-1 drink a day for women.  § 0-2 drinks a day for men.  ? Be aware of how much alcohol is in your drink. In the [place] one drink equals one 12 oz bottle of beer (355 mL), one 5 oz glass of wine (148 mL), or one 1½ oz glass of hard liquor (44 mL).    Lifestyle    · Work with your health care provider to maintain a healthy body weight or to lose weight. Ask what an ideal weight is for you.  · Get at least 30 minutes of exercise most days of the week. Activities may include walking, swimming, or biking.  · Include exercise to  strengthen your muscles (resistance exercise), such as Pilates or lifting weights, as part of your weekly exercise routine. Try to do these types of exercises for 30 minutes at least 3 days a week.  · Do not use any products that contain nicotine or tobacco, such as cigarettes, e-cigarettes, and chewing tobacco. If you need help quitting, ask your health care provider.  · Monitor your blood pressure at home as told by your health care provider.  · Keep all follow-up visits as told by your health care provider. This is important.    Medicines  · Take over-the-counter and prescription medicines only as told by your health care provider. Follow directions carefully. Blood pressure medicines must be taken as prescribed.  · Do not skip doses of blood pressure medicine. Doing this puts you at risk for problems and can make the medicine less effective.  · Ask your health care provider about side effects or reactions to medicines that you should watch for.  Contact a health care provider if you:  · Think you are having a reaction to a medicine you are taking.  · Have headaches that keep coming back (recurring).  · Feel dizzy.  · Have swelling in your ankles.  · Have trouble with your vision.  Get help right away if you:  · Develop a severe headache or confusion.  · Have unusual weakness or numbness.  · Feel faint.  · Have severe pain in your chest or abdomen.  · Vomit repeatedly.  · Have trouble breathing.  Summary  · Hypertension is when the force of blood pumping through your arteries is too strong. If this condition is not controlled, it may put you at risk for serious complications.  · Your personal target blood pressure may vary depending on your medical conditions, your age, and other factors. For most people, a normal blood pressure is less than 120/80.  · Hypertension is treated with lifestyle changes, medicines, or a combination of both. Lifestyle changes include losing weight, eating a healthy, low-sodium diet,  "exercising more, and limiting alcohol.  This information is not intended to replace advice given to you by your health care provider. Make sure you discuss any questions you have with your health care provider.  Document Revised: 08/28/2019 Document Reviewed: 08/28/2019  [employer] Patient Education © 2021 [employer]    https://www.nhlbi.nih.gov/files/docs/public/heart/dash_brief.pdf\">   DASH Eating Plan  DASH stands for Dietary Approaches to Stop Hypertension. The DASH eating plan is a healthy eating plan that has been shown to:  · Reduce high blood pressure (hypertension).  · Reduce your risk for type 2 diabetes, heart disease, and stroke.  · Help with weight loss.  What are tips for following this plan?  Reading food labels  · Check food labels for the amount of salt (sodium) per serving. Choose foods with less than 5 percent of the Daily Value of sodium. Generally, foods with less than 300 milligrams (mg) of sodium per serving fit into this eating plan.  · To find whole grains, look for the word \"whole\" as the first word in the ingredient list.  Shopping  · Buy products labeled as \"low-sodium\" or \"no salt added.\"  · Buy fresh foods. Avoid canned foods and pre-made or frozen meals.  Cooking  · Avoid adding salt when cooking. Use salt-free seasonings or herbs instead of table salt or sea salt. Check with your health care provider or pharmacist before using salt substitutes.  · Do not renee foods. Cook foods using healthy methods such as baking, boiling, grilling, roasting, and broiling instead.  · Cook with heart-healthy oils, such as olive, canola, avocado, soybean, or sunflower oil.  Meal planning    · Eat a balanced diet that includes:  ? 4 or more servings of fruits and 4 or more servings of vegetables each day. Try to fill one-half of your plate with fruits and vegetables.  ? 6-8 servings of whole grains each day.  ? Less than 6 oz (170 g) of lean meat, poultry, or fish each day. A 3-oz (85-g) serving of meat " is about the same size as a deck of cards. One egg equals 1 oz (28 g).  ? 2-3 servings of low-fat dairy each day. One serving is 1 cup (237 mL).  ? 1 serving of nuts, seeds, or beans 5 times each week.  ? 2-3 servings of heart-healthy fats. Healthy fats called omega-3 fatty acids are found in foods such as walnuts, flaxseeds, fortified milks, and eggs. These fats are also found in cold-water fish, such as sardines, salmon, and mackerel.  · Limit how much you eat of:  ? Canned or prepackaged foods.  ? Food that is high in trans fat, such as some fried foods.  ? Food that is high in saturated fat, such as fatty meat.  ? Desserts and other sweets, sugary drinks, and other foods with added sugar.  ? Full-fat dairy products.  · Do not salt foods before eating.  · Do not eat more than 4 egg yolks a week.  · Try to eat at least 2 vegetarian meals a week.  · Eat more home-cooked food and less restaurant, buffet, and fast food.    Lifestyle  · When eating at a restaurant, ask that your food be prepared with less salt or no salt, if possible.  · If you drink alcohol:  ? Limit how much you use to:  § 0-1 drink a day for women who are not pregnant.  § 0-2 drinks a day for men.  ? Be aware of how much alcohol is in your drink. In the [place] one drink equals one 12 oz bottle of beer (355 mL), one 5 oz glass of wine (148 mL), or one 1½ oz glass of hard liquor (44 mL).  General information  · Avoid eating more than 2,300 mg of salt a day. If you have hypertension, you may need to reduce your sodium intake to 1,500 mg a day.  · Work with your health care provider to maintain a healthy body weight or to lose weight. Ask what an ideal weight is for you.  · Get at least 30 minutes of exercise that causes your heart to beat faster (aerobic exercise) most days of the week. Activities may include walking, swimming, or biking.  · Work with your health care provider or dietitian to adjust your eating plan to your individual calorie  needs.  What foods should I eat?  Fruits  All fresh, dried, or frozen fruit. Canned fruit in natural juice (without added sugar).  Vegetables  Fresh or frozen vegetables (raw, steamed, roasted, or grilled). Low-sodium or reduced-sodium tomato and vegetable juice. Low-sodium or reduced-sodium tomato sauce and tomato paste. Low-sodium or reduced-sodium canned vegetables.  Grains  Whole-grain or whole-wheat bread. Whole-grain or whole-wheat pasta. Brown rice. Oatmeal. Quinoa. Bulgur. Whole-grain and low-sodium cereals. Nusrat bread. Low-fat, low-sodium crackers. Whole-wheat flour tortillas.  Meats and other proteins  Skinless chicken or turkey. Ground chicken or turkey. Pork with fat trimmed off. Fish and seafood. Egg whites. Dried beans, peas, or lentils. Unsalted nuts, nut butters, and seeds. Unsalted canned beans. Lean cuts of beef with fat trimmed off. Low-sodium, lean precooked or cured meat, such as sausages or meat loaves.  Dairy  Low-fat (1%) or fat-free (skim) milk. Reduced-fat, low-fat, or fat-free cheeses. Nonfat, low-sodium ricotta or cottage cheese. Low-fat or nonfat yogurt. Low-fat, low-sodium cheese.  Fats and oils  Soft margarine without trans fats. Vegetable oil. Reduced-fat, low-fat, or light mayonnaise and salad dressings (reduced-sodium). Canola, safflower, olive, avocado, soybean, and sunflower oils. Avocado.  Seasonings and condiments  Herbs. Spices. Seasoning mixes without salt.  Other foods  Unsalted popcorn and pretzels. Fat-free sweets.  The items listed above may not be a complete list of foods and beverages you can eat. Contact a dietitian for more information.  What foods should I avoid?  Fruits  Canned fruit in a light or heavy syrup. Fried fruit. Fruit in cream or butter sauce.  Vegetables  Creamed or fried vegetables. Vegetables in a cheese sauce. Regular canned vegetables (not low-sodium or reduced-sodium). Regular canned tomato sauce and paste (not low-sodium or reduced-sodium). Regular  tomato and vegetable juice (not low-sodium or reduced-sodium). Pickles. Olives.  Grains  Baked goods made with fat, such as croissants, muffins, or some breads. Dry pasta or rice meal packs.  Meats and other proteins  Fatty cuts of meat. Ribs. Fried meat. Petty. Bologna, salami, and other precooked or cured meats, such as sausages or meat loaves. Fat from the back of a pig (fatback). Bratwurst. Salted nuts and seeds. Canned beans with added salt. Canned or smoked fish. Whole eggs or egg yolks. Chicken or turkey with skin.  Dairy  Whole or 2% milk, cream, and half-and-half. Whole or full-fat cream cheese. Whole-fat or sweetened yogurt. Full-fat cheese. Nondairy creamers. Whipped toppings. Processed cheese and cheese spreads.  Fats and oils  Butter. Stick margarine. Lard. Shortening. Ghee. Petty fat. Tropical oils, such as coconut, palm kernel, or palm oil.  Seasonings and condiments  Onion salt, garlic salt, seasoned salt, table salt, and sea salt. Worcestershire sauce. Tartar sauce. Barbecue sauce. Teriyaki sauce. Soy sauce, including reduced-sodium. Steak sauce. Canned and packaged gravies. Fish sauce. Oyster sauce. Cocktail sauce. Store-bought horseradish. Ketchup. Mustard. Meat flavorings and tenderizers. Bouillon cubes. Hot sauces. Pre-made or packaged marinades. Pre-made or packaged taco seasonings. Relishes. Regular salad dressings.  Other foods  Salted popcorn and pretzels.  The items listed above may not be a complete list of foods and beverages you should avoid. Contact a dietitian for more information.  Where to find more information  · National Heart, Lung, and [employer] [place]: www.nhlbi.nih.gov  · American Heart Association: www.heart.org  · Academy of Nutrition and Dietetics: www.eatright.org  · National Kidney Foundation: www.kidney.org  Summary  · The DASH eating plan is a healthy eating plan that has been shown to reduce high blood pressure (hypertension). It may also reduce your risk for type 2  diabetes, heart disease, and stroke.  · When on the DASH eating plan, aim to eat more fresh fruits and vegetables, whole grains, lean proteins, low-fat dairy, and heart-healthy fats.  · With the DASH eating plan, you should limit salt (sodium) intake to 2,300 mg a day. If you have hypertension, you may need to reduce your sodium intake to 1,500 mg a day.  · Work with your health care provider or dietitian to adjust your eating plan to your individual calorie needs.  This information is not intended to replace advice given to you by your health care provider. Make sure you discuss any questions you have with your health care provider.  Document Revised: 11/20/2020 Document Reviewed: 11/20/2020  [employer] Patient Education © 2021 [employer]

## 2021-10-05 NOTE — PROGRESS NOTES
Chief Complaint  Wellness Check    Subjective          Henny James presents to [facility] PRIMARY CARE  History of Present Illness    Henny James 61 y.o. female who presents today to transition care from Dr. Parra. The patient has a history of uncontroolled hypertension and sees Dr. Rodriguez, Cardiology weekly for blood pressure management. She has had blood pressure medication administered IV in-office the last two visits. She has decreased her sodium intake. She occasionally misses doses of her blood pressure medication. She did not take Hydralazine 75 mg last night because she feel asleep. She has taken all of her medication today. She has an appointment with Dr. Rodriguez tomorrow at 9:00 am. No headaches, blurred vision, dizziness, weakness, chest pain, lower extremity edema, palpitations, or syncope.    She drinks 4 ginger ale's per day. She drinks decaffeinated coffee and 3 glasses of sweet tea per week. She has gained 15 pounds over the last year. She does not drink water daily.    She has a problem list of the following:   Patient Active Problem List   Diagnosis   • Abnormal urine finding   • Anemia   • Atrial fibrillation (HCC)   • CAD (coronary artery disease), native coronary artery   • Chest pain syndrome   • Chronic left hip pain   • Dizziness   • Fatigue   • HLD (hyperlipidemia)   • Hypertensive urgency   • Intermittent claudication (HCC)   • Long term (current) use of anticoagulants   • Lower abdominal pain   • Neoplasm of uncertain behavior of connective and other soft tissue   • Palpitations   • Personal history of other specified conditions presenting hazards to health   • Personal history of tobacco use, presenting hazards to health   • Thrombocytosis   • Transient ischemic attack (TIA), and cerebral infarction without residual deficits(V12.54)   • Uncontrolled hypertension   • Hypokalemia   • Bipolar 1 disorder (HCC)   • Prediabetes   • Psychophysiological insomnia   •  "Hypertensive encephalopathy   .        Since the last visit, She has overall felt well.        She has been compliant with the following medications:   Current Outpatient Medications:   •  ALPRAZolam (XANAX) 1 MG tablet, Take 1 mg by mouth 3 (Three) Times a Day As Needed for Anxiety., Disp: , Rfl:   •  amLODIPine (NORVASC) 10 MG tablet, Take 1 tablet by mouth Daily., Disp: 15 tablet, Rfl: 0  •  amLODIPine (NORVASC) 5 MG tablet, Take 1 tablet by mouth Daily., Disp: 30 tablet, Rfl: 5  •  ARIPiprazole (ABILIFY) 10 MG tablet, Take 10 mg by mouth Every Night., Disp: , Rfl:   •  aspirin 81 MG chewable tablet, Chew 81 mg Daily., Disp: , Rfl:   •  azilsartan medoxomil (Edarbi) 80 MG tablet tablet, Take 1 tablet by mouth Daily., Disp: 30 tablet, Rfl: 11  •  carvedilol (COREG) 25 MG tablet, Take 1 tablet by mouth 2 (Two) Times a Day., Disp: 60 tablet, Rfl: 8  •  Cyanocobalamin (B-12 PO), Take  by mouth., Disp: , Rfl:   •  hydrALAZINE (APRESOLINE) 50 MG tablet, Take 1 tablet by mouth 3 (Three) Times a Day. (Patient taking differently: Take 75 mg by mouth 3 (Three) Times a Day.), Disp: 90 tablet, Rfl: 11  •  lamoTRIgine (LaMICtal) 200 MG tablet, Take 200 mg by mouth Every Night., Disp: , Rfl:   •  meloxicam (MOBIC) 15 MG tablet, , Disp: , Rfl:   •  traZODone (DESYREL) 50 MG tablet, , Disp: , Rfl: .        She  denies medication side effects.      All of the other chronic condition(s) listed above are stable w/o issues.    BP (!) 182/94   Pulse 56   Temp 95.7 °F (35.4 °C)   Resp 16   Ht 165.1 cm (65\")   Wt 107 kg (235 lb)   SpO2 98%   BMI 39.11 kg/m²     Results for orders placed or performed during the hospital encounter of 06/07/21   Adult Transthoracic Echo Complete W/ Cont if Necessary Per Protocol   Result Value Ref Range    BSA 2.2 m^2    IVSd 1.4 cm    LVIDd 4.3 cm    LVIDs 3.0 cm    LVPWd 1.3 cm    IVS/LVPW 1.1     FS 30.2 %    EDV(Teich) 83.1 ml    ESV(Teich) 35.0 ml    EF(Teich) 57.9 %    EF(cubed) 66.0 %    LV " mass(C)d 219.8 grams    LV mass(C)dI 101.9 grams/m^2    SV(Teich) 48.1 ml    SI(Teich) 22.3 ml/m^2    SV(cubed) 52.5 ml    SI(cubed) 24.4 ml/m^2    Ao root diam 3.1 cm    Ao root area 7.5 cm^2    ACS 1.8 cm    asc Aorta Diam 3.1 cm    LVOT diam 2.0 cm    LVOT area 3.1 cm^2    LVOT area(traced) 3.1 cm^2    RVOT diam 2.0 cm    RVOT area 3.1 cm^2    LVLd ap4 7.9 cm    EDV(MOD-sp4) 76.0 ml    LVLs ap4 6.2 cm    ESV(MOD-sp4) 29.0 ml    EF(MOD-sp4) 61.8 %    LVLd ap2 8.3 cm    EDV(MOD-sp2) 121.0 ml    LVLs ap2 7.0 cm    ESV(MOD-sp2) 42.0 ml    EF(MOD-sp2) 65.3 %    SV(MOD-sp4) 47.0 ml    SI(MOD-sp4) 21.8 ml/m^2    SV(MOD-sp2) 79.0 ml    SI(MOD-sp2) 36.6 ml/m^2    Ao root area (BSA corrected) 1.4     LV [place]ieira Vol (BSA corrected) 35.2 ml/m^2    LV Sys Vol (BSA corrected) 13.4 ml/m^2    TAPSE (>1.6) 2.1 cm    EF(MOD-bp) 62.0 %    MV A dur 0.15 sec    MV E max nino 58.3 cm/sec    MV A max nino 95.1 cm/sec    MV E/A 0.61     MV V2 max 94.7 cm/sec    MV max PG 3.6 mmHg    MV V2 mean 46.9 cm/sec    MV mean PG 1.0 mmHg    MV V2 VTI 25.3 cm    MVA(VTI) 2.1 cm^2    MV P1/2t max nino 77.4 cm/sec    MV P1/2t 76.1 msec    MVA(P1/2t) 2.9 cm^2    MV dec slope 298.0 cm/sec^2    MV dec time 0.34 sec    Ao pk nino 164.0 cm/sec    Ao max PG 10.8 mmHg    Ao max PG (full) 8.1 mmHg    Ao V2 mean 118.0 cm/sec    Ao mean PG 6.0 mmHg    Ao mean PG (full) 4.0 mmHg    Ao V2 VTI 32.4 cm    MOHAN(I,A) 1.6 cm^2    MOHAN(I,D) 1.6 cm^2    MOHAN(V,A) 1.6 cm^2    MOHAN(V,D) 1.6 cm^2    AI max nino 522.0 cm/sec    AI max .0 mmHg    AI dec slope 189.0 cm/sec^2    AI P1/2t 808.9 msec    LV V1 max PG 2.7 mmHg    LV V1 mean PG 2.0 mmHg    LV V1 max 82.2 cm/sec    LV V1 mean 59.8 cm/sec    LV V1 VTI 17.0 cm    MR max nino 463.0 cm/sec    MR max PG 85.7 mmHg    SV(Ao) 244.5 ml    SI(Ao) 113.4 ml/m^2    SV(LVOT) 53.4 ml    SV(RVOT) 40.2 ml    SI(LVOT) 24.8 ml/m^2    PA V2 max 74.7 cm/sec    PA max PG 2.2 mmHg    PA max PG (full) 1.2 mmHg    BH CV ECHO JESUS - PVA(V,A) 2.2  "cm^2     CV ECHO JESUS - PVA(V,D) 2.2 cm^2    PA acc time 0.16 sec    RV V1 max PG 1.1 mmHg    RV V1 mean PG 1.0 mmHg    RV V1 max 51.6 cm/sec    RV V1 mean 39.7 cm/sec    RV V1 VTI 12.8 cm    TR max john 214.0 cm/sec    RVSP(TR) 21.3 mmHg    RAP systole 3.0 mmHg    PA pr(Accel) 6.6 mmHg    Pulm Sys John 35.6 cm/sec    Pulm Vieira John 48.8 cm/sec    Pulm S/D 0.73     Qp/Qs 0.75     Pulm A Revs Dur 0.11 sec    Pulm A Revs John 27.0 cm/sec    MVA P1/2T LCG 2.8 cm^2    RV Base 3.0 cm    RV Length 7.3 cm    RV Mid 2.2 cm    Lat Peak E' John 6.3 cm/sec    Med Peak E' John 5.0 cm/sec    RV S' 15.1 cm/sec     CV ECHO JESUS - BZI_BMI 40.8 kilograms/m^2     CV ECHO JESUS - BSA([facility]) 2.3 m^2     CV ECHO JESUS - BZI_METRIC_WEIGHT 111.1 kg     CV ECHO JESUS - BZI_METRIC_HEIGHT 165.1 cm    Avg E/e' ratio 10.32     Target HR (85%) 138 bpm    Max. Pred. HR (100%) 162 bpm    Sinus 2.8 cm    STJ 2.3 cm    Ascending aorta 3.1 cm    Aortic arch 2.1 cm    LA Volume Index 23.0 mL/m2    Abdo Ao Diam 2.0 cm    Echo EF Estimated 65 %                  Objective     Vital Signs:   BP (!) 182/94   Pulse 56   Temp 95.7 °F (35.4 °C)   Resp 16   Ht 165.1 cm (65\")   Wt 107 kg (235 lb)   SpO2 98%   BMI 39.11 kg/m²      Review of Systems   Constitutional: Negative for appetite change, fatigue and fever.   HENT: Negative for nosebleeds and trouble swallowing.    Eyes: Negative for blurred vision, double vision and visual disturbance.   Respiratory: Negative for choking, chest tightness, shortness of breath and wheezing.    Cardiovascular: Negative for chest pain, palpitations and leg swelling.   Gastrointestinal: Negative for abdominal pain and blood in stool.   Genitourinary: Negative.  Negative for dysuria, frequency and urgency.   Musculoskeletal: Negative.  Negative for myalgias.   Skin: Negative.    Allergic/Immunologic: Negative for immunocompromised state.   Neurological: Negative for dizziness, tremors, seizures, syncope, facial " asymmetry, speech difficulty, weakness, light-headedness, numbness, headache, memory problem and confusion.   Hematological: Negative for adenopathy.   Psychiatric/Behavioral: Negative for dysphoric mood, self-injury and suicidal ideas.         Physical Exam  Vitals and nursing note reviewed.   Constitutional:       General: She is not in acute distress.     Appearance: She is well-developed. She is obese. She is not ill-appearing, toxic-appearing or diaphoretic.   HENT:      Head: Normocephalic.      Right Ear: External ear normal.      Left Ear: External ear normal.   Eyes:      General: No scleral icterus.     Pupils: Pupils are equal, round, and reactive to light.   Neck:      Thyroid: No thyromegaly.      Vascular: No carotid bruit.   Cardiovascular:      Rate and Rhythm: Normal rate and regular rhythm.      Pulses: Normal pulses.      Heart sounds: Normal heart sounds.   Pulmonary:      Effort: Pulmonary effort is normal. No respiratory distress.      Breath sounds: Normal breath sounds. No stridor.   Musculoskeletal:         General: No swelling or deformity.      Cervical back: Normal range of motion and neck supple.      Right lower leg: No edema.      Left lower leg: No edema.   Skin:     General: Skin is warm.      Coloration: Skin is not jaundiced.   Neurological:      General: No focal deficit present.      Mental Status: She is alert and oriented to person, place, and time.      Sensory: No sensory deficit.      Motor: No weakness.      Coordination: Coordination normal.      Gait: Gait normal.   Psychiatric:         Mood and Affect: Mood normal.         Behavior: Behavior normal.         Thought Content: Thought content normal.         Judgment: Judgment normal.          Result Review :                Assessment and Plan      Diagnoses and all orders for this visit:    1. Uncontrolled hypertension (Primary)  -     Comprehensive metabolic panel  -     Lipid panel  -     CBC and Differential  -      TSH  -     Hemoglobin A1c            Follow Up     Return in about 6 months (around 4/5/2022) for Next scheduled follow up.    Patient was given instructions and counseling regarding her condition or for health maintenance advice. Please see specific information pulled into the AVS if appropriate.  The patient was given information in her after visit summary today regarding hypertension and DASH eating plan.    Preventative counseling provided during visit regarding low-sodium diet, daily exercise, and the following:  Low glycemic index diet  Exercise 30 minutes most days of the week  Make sure you get results on any labs or tests we ordered today  We discussed medications and how to take them as prescribed  Sleep 6-8 hours each night if possible  If you have not signed up for [employer], please activate your code ASAP from your After Visit Summary today    LDL goal <100  HDL goal >60  Triglyceride goal <150  BP goal =<130/80  Fasting glucose <100    Summary:   -The patient was instructed to call Dr. Rodriguez immediately after today's visit to report blood pressure readings (due to Dr. Rodriguez currently manages the patient's blood pressure and all blood pressure medications). The patient verbally stated she will call Dr. Rodriguez's office to report blood pressure readings from today's visit.  -The patient has a scheduled appointment with Dr. Rodriguez tomorrow at 9:00 AM  -The patient was instructed to take all medication as prescribed and not miss any doses  -The patient was instructed to adhere to a strict low-sodium diet, daily exercise, decrease daily caffeine intake, work on weight loss, and increase daily water intake  -The patient was instructed to seek immediate medical attention for consistent headaches, blurred vision or visual disturbances, weakness, speech difficulty, chest pain, palpitations, lower extremity edema, dizziness, or syncope.  -Lab orders were placed today  -Will follow up with this patient in 6  months, and sooner if necessary  -The patient verbalized understanding of all instructions given today.

## 2021-10-06 ENCOUNTER — OFFICE VISIT (OUTPATIENT)
Dept: CARDIOLOGY | Facility: CLINIC | Age: 61
End: 2021-10-06

## 2021-10-06 ENCOUNTER — HOSPITAL ENCOUNTER (OUTPATIENT)
Dept: CARDIOLOGY | Facility: HOSPITAL | Age: 61
Discharge: HOME | End: 2021-10-06
Admitting: NURSE PRACTITIONER

## 2021-10-06 VITALS
DIASTOLIC BLOOD PRESSURE: 96 MMHG | WEIGHT: 232.6 LBS | BODY MASS INDEX: 38.75 KG/M2 | HEIGHT: 65 IN | OXYGEN SATURATION: 98 % | HEART RATE: 75 BPM | SYSTOLIC BLOOD PRESSURE: 168 MMHG

## 2021-10-06 VITALS — DIASTOLIC BLOOD PRESSURE: 85 MMHG | HEART RATE: 64 BPM | SYSTOLIC BLOOD PRESSURE: 149 MMHG

## 2021-10-06 DIAGNOSIS — I10 UNCONTROLLED HYPERTENSION: Primary | ICD-10-CM

## 2021-10-06 DIAGNOSIS — I25.10 CORONARY ARTERY DISEASE INVOLVING NATIVE CORONARY ARTERY OF NATIVE HEART WITHOUT ANGINA PECTORIS: ICD-10-CM

## 2021-10-06 PROCEDURE — 36415 COLL VENOUS BLD VENIPUNCTURE: CPT

## 2021-10-06 PROCEDURE — 99214 OFFICE O/P EST MOD 30 MIN: CPT | Performed by: NURSE PRACTITIONER

## 2021-10-06 PROCEDURE — 25010000002 HYDRALAZINE PER 20 MG: Performed by: NURSE PRACTITIONER

## 2021-10-06 PROCEDURE — 96374 THER/PROPH/DIAG INJ IV PUSH: CPT

## 2021-10-06 RX ORDER — HYDRALAZINE HYDROCHLORIDE 20 MG/ML
10 INJECTION INTRAMUSCULAR; INTRAVENOUS ONCE
Status: COMPLETED | OUTPATIENT
Start: 2021-10-06 | End: 2021-10-06

## 2021-10-06 RX ADMIN — HYDRALAZINE HYDROCHLORIDE 10 MG: 20 INJECTION INTRAMUSCULAR; INTRAVENOUS at 10:44

## 2021-10-06 NOTE — PROGRESS NOTES
"    CARDIOLOGY        Patient Name: Henny James  :1960  Age: 61 y.o.  Primary Cardiologist: Jorge Alberto Rdoriguez MD  Encounter Provider:  ANASTASIIA Salcido    Date of Service: 21          CHIEF COMPLAINT / REASON FOR OFFICE VISIT     Hypertension      HISTORY OF PRESENT ILLNESS       Hypertension  Associated symptoms include headaches and malaise/fatigue.     Henny James is a 61 y.o. female who presents today for reevaluation of hypertension.     Pt has a  history significant for CAD, atrial fibrillation, hypertension, history of stroke.    Patient has been followed by myself and Dr. Rodriguez for hypertensive urgency and uncontrolled hypertension.  At last assessment Edarbi was added to her regimen.  She still has uncontrolled hypertension with blood pressure reading of 168/94 today.  She states that she was started on Mobic by a orthopedic doctor and reports that she had adverse effects and that it elevated her blood pressure even more.  She has since discontinued that medication.  She has had adverse effects to spironolactone and metoprolol in the past.  Denies chest pain, shortness of breath, palpitations but does report extreme fatigue and lower extremity edema.  She has tolerated a Torrie without adverse effects but blood pressure is just not controlled.    The following portions of the patient's history were reviewed and updated as appropriate: allergies, current medications, past family history, past medical history, past social history, past surgical history and problem list.      VITAL SIGNS     Visit Vitals  /96 (BP Location: Right arm, Patient Position: Sitting, Cuff Size: Large Adult)   Pulse 75   Ht 165.1 cm (65\")   Wt 106 kg (232 lb 9.6 oz)   SpO2 98%   BMI 38.71 kg/m²         Wt Readings from Last 3 Encounters:   10/06/21 106 kg (232 lb 9.6 oz)   10/05/21 107 kg (235 lb)   21 107 kg (235 lb)     Body mass index is 38.71 kg/m².      REVIEW OF SYSTEMS   Review of Systems "   Constitutional: Positive for malaise/fatigue. Negative for chills, fever, weight gain and weight loss.   Cardiovascular: Negative for leg swelling.   Respiratory: Negative for cough, snoring and wheezing.    Hematologic/Lymphatic: Negative for bleeding problem. Does not bruise/bleed easily.   Skin: Negative for color change.   Musculoskeletal: Negative for falls, joint pain and myalgias.   Gastrointestinal: Negative for melena.   Genitourinary: Negative for hematuria.   Neurological: Positive for headaches and light-headedness. Negative for excessive daytime sleepiness.   Psychiatric/Behavioral: Negative for depression. The patient is not nervous/anxious.            PHYSICAL EXAMINATION     Vitals and nursing note reviewed.   Constitutional:       Appearance: Normal appearance. Well-developed.   Eyes:      Conjunctiva/sclera: Conjunctivae normal.   Neck:      Vascular: No carotid bruit.   Pulmonary:      Breath sounds: Normal breath sounds.   Cardiovascular:      Normal rate. Regular rhythm. Normal S1 with normal intensity. Normal S2 with normal intensity.      Murmurs: There is no murmur.      No gallop. No click. No rub.   Edema:     Peripheral edema absent.   Musculoskeletal: Normal range of motion. Skin:     General: Skin is warm and dry.   Neurological:      Mental Status: Alert and oriented to person, place, and time.      GCS: GCS eye subscore is 4. GCS verbal subscore is 5. GCS motor subscore is 6.   Psychiatric:         Speech: Speech normal.         Behavior: Behavior normal.         Thought Content: Thought content normal.         Judgment: Judgment normal.           REVIEWED DATA     Procedures    Cardiac Procedures:  1. Echocardiogram 6/7/2021.  LVEF 65%.  Mild LVH.  Mild aortic valve regurgitation.  Grade 1 diastolic dysfunction.          ASSESSMENT & PLAN      Diagnosis Plan   1. Uncontrolled hypertension  Duplex Renal Artery - Bilateral Complete CAR    hydrALAZINE (APRESOLINE) injection 10 mg   2.  Coronary artery disease involving native coronary artery of native heart without angina pectoris           SUMMARY/DISCUSSION  1. Hypertensive urgency.  Patient's blood pressure is still not at goal and is markedly elevated today at 164/94.  Unfortunately patient was recently started on an NSAID which she feels made her blood pressure more elevated.  She has since discontinued this.  She states that she does have symptoms of headache and extreme fatigue with elevated blood pressure readings.  She has had a negative stress test in 2019 which was done for uncontrolled hypertension, renal ultrasound was negative in April 2021 however this was not a duplex study.  She is also had a normal echocardiogram in June 2021.  Patient has adverse effects with spironolactone experiencing extreme fatigue and weight gain with metoprolol.  At this point I have recommended patient starting a [place]/chlorthalidone 40/12.5 mg twice per day.  She will continue amlodipine 10 mg/day, carvedilol 25 mg twice per day, hydralazine 75 mg 3 times per day.  She will go to the [facility] today for hydralazine 10 mg IV x1 to try to improve her blood pressure reading.  I have recommended a renal artery duplex to evaluate for renal artery stenosis.  2. History of CAD.  Currently denies any angina.  Continue aspirin.  3. Follow-up with me in 1 week for reevaluation of hypertension.        MEDICATIONS         Discharge Medications          Accurate as of October 6, 2021 12:37 PM. If you have any questions, ask your nurse or doctor.            New Medications      Instructions Start Date   Azilsartan-Chlorthalidone 40-12.5 MG tablet  Started by: ANASTASIIA Salcido   1 tablet, Oral, 2 times daily         Changes to Medications      Instructions Start Date   amLODIPine 10 MG tablet  Commonly known as: NORVASC  What changed: Another medication with the same name was removed. Continue taking this medication, and follow the directions you see here.  Changed by:  ANASTASIIA Salcido   10 mg, Oral, Daily      hydrALAZINE 50 MG tablet  Commonly known as: APRESOLINE  What changed: how much to take   50 mg, Oral, 3 Times Daily         Continue These Medications      Instructions Start Date   ARIPiprazole 10 MG tablet  Commonly known as: ABILIFY   10 mg, Oral, Nightly      aspirin 81 MG chewable tablet   81 mg, Oral, Daily      B-12 PO   Oral      carvedilol 25 MG tablet  Commonly known as: COREG   25 mg, Oral, 2 Times Daily      lamoTRIgine 200 MG tablet  Commonly known as: LaMICtal   200 mg, Oral, Nightly      traZODone 50 MG tablet  Commonly known as: DESYREL   No dose, route, or frequency recorded.      Xanax 1 MG tablet  Generic drug: ALPRAZolam   1 mg, Oral, 3 Times Daily PRN         Stop These Medications    azilsartan medoxomil 80 MG tablet tablet  Commonly known as: Edarbi  Stopped by: ANASTASIIA Salcido     meloxicam 15 MG tablet  Commonly known as: MOBIC  Stopped by: ANASTASIIA Salcido                **Dragon Disclaimer:   Much of this encounter note is an electronic transcription/translation of spoken language to printed text. The electronic translation of spoken language may permit erroneous, or at times, nonsensical words or phrases to be inadvertently transcribed. Although I have reviewed the note for such errors, some may still exist.

## 2021-10-08 ENCOUNTER — HOSPITAL ENCOUNTER (EMERGENCY)
Facility: HOSPITAL | Age: 61
Discharge: HOME | End: 2021-10-08
Attending: EMERGENCY MEDICINE | Admitting: EMERGENCY MEDICINE

## 2021-10-08 VITALS
TEMPERATURE: 97.8 F | HEART RATE: 55 BPM | OXYGEN SATURATION: 97 % | RESPIRATION RATE: 18 BRPM | SYSTOLIC BLOOD PRESSURE: 155 MMHG | DIASTOLIC BLOOD PRESSURE: 82 MMHG

## 2021-10-08 DIAGNOSIS — I10 PRIMARY HYPERTENSION: ICD-10-CM

## 2021-10-08 DIAGNOSIS — N93.8 DYSFUNCTIONAL UTERINE BLEEDING: Primary | ICD-10-CM

## 2021-10-08 LAB
ABO GROUP BLD: NORMAL
ALBUMIN SERPL-MCNC: 4.7 G/DL (ref 3.5–5.2)
ALBUMIN/GLOB SERPL: 1.3 G/DL
ALP SERPL-CCNC: 103 U/L (ref 39–117)
ALT SERPL W P-5'-P-CCNC: 19 U/L (ref 1–33)
ANION GAP SERPL CALCULATED.3IONS-SCNC: 10 MMOL/L (ref 5–15)
APTT PPP: 31.2 SECONDS (ref 22.7–35.4)
AST SERPL-CCNC: 15 U/L (ref 1–32)
BASOPHILS # BLD AUTO: 0.04 10*3/MM3 (ref 0–0.2)
BASOPHILS NFR BLD AUTO: 0.6 % (ref 0–1.5)
BILIRUB SERPL-MCNC: 0.2 MG/DL (ref 0–1.2)
BLD GP AB SCN SERPL QL: NEGATIVE
BUN SERPL-MCNC: 11 MG/DL (ref 6–20)
BUN/CREAT SERPL: 14.9 (ref 7–25)
CALCIUM SPEC-SCNC: 10.3 MG/DL (ref 8.6–10.5)
CHLORIDE SERPL-SCNC: 100 MMOL/L (ref 98–107)
CO2 SERPL-SCNC: 27 MMOL/L (ref 22–29)
CREAT SERPL-MCNC: 0.74 MG/DL (ref 0.57–1)
DEPRECATED RDW RBC AUTO: 44.7 FL (ref 37–54)
EOSINOPHIL # BLD AUTO: 0.02 10*3/MM3 (ref 0–0.4)
EOSINOPHIL NFR BLD AUTO: 0.3 % (ref 0.3–6.2)
ERYTHROCYTE [DISTWIDTH] IN BLOOD BY AUTOMATED COUNT: 14.4 % (ref 12.3–15.4)
GFR SERPL CREATININE-BSD FRML MDRD: 97 ML/MIN/1.73
GLOBULIN UR ELPH-MCNC: 3.5 GM/DL
GLUCOSE SERPL-MCNC: 91 MG/DL (ref 65–99)
HCT VFR BLD AUTO: 36.4 % (ref 34–46.6)
HGB BLD-MCNC: 12.2 G/DL (ref 12–15.9)
IMM GRANULOCYTES # BLD AUTO: 0.02 10*3/MM3 (ref 0–0.05)
IMM GRANULOCYTES NFR BLD AUTO: 0.3 % (ref 0–0.5)
INR PPP: 1.16 (ref 0.9–1.1)
LYMPHOCYTES # BLD AUTO: 1.79 10*3/MM3 (ref 0.7–3.1)
LYMPHOCYTES NFR BLD AUTO: 28.9 % (ref 19.6–45.3)
MCH RBC QN AUTO: 28.8 PG (ref 26.6–33)
MCHC RBC AUTO-ENTMCNC: 33.5 G/DL (ref 31.5–35.7)
MCV RBC AUTO: 86.1 FL (ref 79–97)
MONOCYTES # BLD AUTO: 0.58 10*3/MM3 (ref 0.1–0.9)
MONOCYTES NFR BLD AUTO: 9.4 % (ref 5–12)
NEUTROPHILS NFR BLD AUTO: 3.74 10*3/MM3 (ref 1.7–7)
NEUTROPHILS NFR BLD AUTO: 60.5 % (ref 42.7–76)
NRBC BLD AUTO-RTO: 0 /100 WBC (ref 0–0.2)
PLATELET # BLD AUTO: 544 10*3/MM3 (ref 140–450)
PMV BLD AUTO: 10 FL (ref 6–12)
POTASSIUM SERPL-SCNC: 3.5 MMOL/L (ref 3.5–5.2)
PROT SERPL-MCNC: 8.2 G/DL (ref 6–8.5)
PROTHROMBIN TIME: 14.6 SECONDS (ref 11.7–14.2)
RBC # BLD AUTO: 4.23 10*6/MM3 (ref 3.77–5.28)
RH BLD: POSITIVE
SODIUM SERPL-SCNC: 137 MMOL/L (ref 136–145)
T&S EXPIRATION DATE: NORMAL
WBC # BLD AUTO: 6.19 10*3/MM3 (ref 3.4–10.8)

## 2021-10-08 PROCEDURE — 85610 PROTHROMBIN TIME: CPT | Performed by: EMERGENCY MEDICINE

## 2021-10-08 PROCEDURE — 80053 COMPREHEN METABOLIC PANEL: CPT | Performed by: EMERGENCY MEDICINE

## 2021-10-08 PROCEDURE — 85730 THROMBOPLASTIN TIME PARTIAL: CPT | Performed by: EMERGENCY MEDICINE

## 2021-10-08 PROCEDURE — 86901 BLOOD TYPING SEROLOGIC RH(D): CPT | Performed by: EMERGENCY MEDICINE

## 2021-10-08 PROCEDURE — 86850 RBC ANTIBODY SCREEN: CPT | Performed by: EMERGENCY MEDICINE

## 2021-10-08 PROCEDURE — 85025 COMPLETE CBC W/AUTO DIFF WBC: CPT | Performed by: EMERGENCY MEDICINE

## 2021-10-08 PROCEDURE — 86900 BLOOD TYPING SEROLOGIC ABO: CPT | Performed by: EMERGENCY MEDICINE

## 2021-10-08 PROCEDURE — 99283 EMERGENCY DEPT VISIT LOW MDM: CPT

## 2021-10-08 RX ORDER — SODIUM CHLORIDE 0.9 % (FLUSH) 0.9 %
10 SYRINGE (ML) INJECTION AS NEEDED
Status: DISCONTINUED | OUTPATIENT
Start: 2021-10-08 | End: 2021-10-08 | Stop reason: HOSPADM

## 2021-10-08 NOTE — ED PROVIDER NOTES
EMERGENCY DEPARTMENT ENCOUNTER    Room Number:  38/38  Date of encounter:  10/8/2021  PCP: Rowena Valerio APRN  Historian: Patient      HPI:  Chief Complaint: Vaginal bleeding  A complete HPI/ROS/PMH/PSH/SH/FH are unobtainable due to: None    Context: Henny James is a 61 y.o. female who presents to the ED c/o vaginal bleeding.  Onset 1 month ago.  This is daily.  She is changing pads or time she was the restroom.  She describes as a spotting.  Nothing makes this better or worse.    She also complains of having poorly controlled hypertension for which she has been followed closely by cardiology.  They have tried multiple medications and recently added her on accommodation pill.  She has a renal duplex artery ultrasound scheduled      PAST MEDICAL HISTORY  Active Ambulatory Problems     Diagnosis Date Noted   • Abnormal urine finding 10/23/2018   • Anemia 08/09/2013   • Atrial fibrillation (HCC) 05/23/2018   • CAD (coronary artery disease), native coronary artery 12/13/2019   • Chest pain syndrome 06/04/2015   • Chronic left hip pain 10/23/2018   • Dizziness 10/23/2018   • Fatigue 10/23/2018   • HLD (hyperlipidemia) 12/13/2019   • Hypertensive urgency 04/17/2018   • Intermittent claudication (HCC) 10/23/2013   • Long term (current) use of anticoagulants 08/09/2013   • Lower abdominal pain 02/05/2019   • Neoplasm of uncertain behavior of connective and other soft tissue 08/09/2013   • Palpitations 10/18/2019   • Personal history of other specified conditions presenting hazards to health 08/09/2013   • Personal history of tobacco use, presenting hazards to health 08/09/2013   • Thrombocytosis 07/24/2015   • Transient ischemic attack (TIA), and cerebral infarction without residual deficits(V12.54) 08/09/2013   • Uncontrolled hypertension 08/09/2013   • Hypokalemia 08/24/2020   • Bipolar 1 disorder (HCC) 08/24/2020   • Prediabetes 08/25/2020   • Psychophysiological insomnia 08/31/2020   • Hypertensive encephalopathy  2020     Resolved Ambulatory Problems     Diagnosis Date Noted   • Stroke-like symptoms 2018   • Vision disturbance 2020     Past Medical History:   Diagnosis Date   • Anxiety    • Arthritis    • CAD (coronary artery disease)    • COVID-19    • Depression    • Heart palpitations    • History of cocaine abuse (HCC)    • History of gastric cancer 10/2011   • History of gastrointestinal bleeding    • History of kidney surgery    • History of myocardial infarction    • History of suicide attempt    • History of thrombocytosis 2015   • Hypercholesteremia    • Hypersomnia    • Hypertension    • Obesity (BMI 30-39.9)    • Stroke (HCC)    • Thrombus of left atrial appendage 10/2011   • TIA (transient ischemic attack)          PAST SURGICAL HISTORY  Past Surgical History:   Procedure Laterality Date   • CARDIAC CATHETERIZATION     •  SECTION     • ENDOMETRIAL BIOPSY N/A 01/10/2020    Negative for Atypia, Negative for Malignancy - Mohit Fay   • EXPLORATORY LAPAROTOMY N/A 10/17/2011    w/ Resection of Gastrointestinal Stromal Tumor   • SPLENECTOMY     • TONSILLECTOMY     • TUBAL ABDOMINAL LIGATION           FAMILY HISTORY  Family History   Problem Relation Age of Onset   • Bipolar disorder Daughter    • Hypertension Son    • Alcohol abuse Son    • Hypertension Father         Weight loss - helped no longer hypertensive   • Hypertension Paternal Uncle    • Stroke Paternal Uncle          SOCIAL HISTORY  Social History     Socioeconomic History   • Marital status:      Spouse name: Not on file   • Number of children: 5   • Years of education: 13   • Highest education level: Not on file   Tobacco Use   • Smoking status: Never Smoker   • Smokeless tobacco: Never Used   • Tobacco comment: decaf coffee, 1 ginger ale   Substance and Sexual Activity   • Alcohol use: Yes     Comment: SOCIALLY   • Drug use: Yes     Types: Marijuana     Comment: STOPPED COCAINE 8 YEARS AGO   •  Sexual activity: Defer         ALLERGIES  Metoprolol succinate [metoprolol] and Spironolactone        REVIEW OF SYSTEMS  Review of Systems     All systems reviewed and negative except for those discussed in HPI.       PHYSICAL EXAM    I have reviewed the triage vital signs and nursing notes.    ED Triage Vitals   Temp Heart Rate Resp BP SpO2   10/08/21 0925 10/08/21 0925 10/08/21 0925 10/08/21 0950 10/08/21 0925   97.8 °F (36.6 °C) 64 18 146/83 97 %      Temp src Heart Rate Source Patient Position BP Location FiO2 (%)   -- 10/08/21 0925 -- -- --    Monitor          Physical Exam  GENERAL: not distressed  HENT: nares patent  EYES: no scleral icterus  CV: regular rhythm, regular rate  RESPIRATORY: normal effort, clear to auscultation bilaterally  ABDOMEN: soft, nontender  : Scant blood in the vaginal vault, no active bleeding through the cervical os  MUSCULOSKELETAL: no deformity  NEURO: alert, moves all extremities, follows commands, cranial nerves II through XII are grossly intact, normal speech  SKIN: warm, dry        LAB RESULTS  Recent Results (from the past 24 hour(s))   Comprehensive Metabolic Panel    Collection Time: 10/08/21 11:19 AM    Specimen: Blood   Result Value Ref Range    Glucose 91 65 - 99 mg/dL    BUN 11 6 - 20 mg/dL    Creatinine 0.74 0.57 - 1.00 mg/dL    Sodium 137 136 - 145 mmol/L    Potassium 3.5 3.5 - 5.2 mmol/L    Chloride 100 98 - 107 mmol/L    CO2 27.0 22.0 - 29.0 mmol/L    Calcium 10.3 8.6 - 10.5 mg/dL    Total Protein 8.2 6.0 - 8.5 g/dL    Albumin 4.70 3.50 - 5.20 g/dL    ALT (SGPT) 19 1 - 33 U/L    AST (SGOT) 15 1 - 32 U/L    Alkaline Phosphatase 103 39 - 117 U/L    Total Bilirubin 0.2 0.0 - 1.2 mg/dL    eGFR  African Amer 97 >60 mL/min/1.73    Globulin 3.5 gm/dL    A/G Ratio 1.3 g/dL    BUN/Creatinine Ratio 14.9 7.0 - 25.0    Anion Gap 10.0 5.0 - 15.0 mmol/L   Protime-INR    Collection Time: 10/08/21 11:19 AM    Specimen: Blood   Result Value Ref Range    Protime 14.6 (H) 11.7 - 14.2  Seconds    INR 1.16 (H) 0.90 - 1.10   aPTT    Collection Time: 10/08/21 11:19 AM    Specimen: Blood   Result Value Ref Range    PTT 31.2 22.7 - 35.4 seconds   Type & Screen    Collection Time: 10/08/21 11:19 AM    Specimen: Blood   Result Value Ref Range    ABO Type O     RH type Positive     Antibody Screen Negative     T&S Expiration Date 10/11/2021 11:59:59 PM    CBC Auto Differential    Collection Time: 10/08/21 11:19 AM    Specimen: Blood   Result Value Ref Range    WBC 6.19 3.40 - 10.80 10*3/mm3    RBC 4.23 3.77 - 5.28 10*6/mm3    Hemoglobin 12.2 12.0 - 15.9 g/dL    Hematocrit 36.4 34.0 - 46.6 %    MCV 86.1 79.0 - 97.0 fL    MCH 28.8 26.6 - 33.0 pg    MCHC 33.5 31.5 - 35.7 g/dL    RDW 14.4 12.3 - 15.4 %    RDW-SD 44.7 37.0 - 54.0 fl    MPV 10.0 6.0 - 12.0 fL    Platelets 544 (H) 140 - 450 10*3/mm3    Neutrophil % 60.5 42.7 - 76.0 %    Lymphocyte % 28.9 19.6 - 45.3 %    Monocyte % 9.4 5.0 - 12.0 %    Eosinophil % 0.3 0.3 - 6.2 %    Basophil % 0.6 0.0 - 1.5 %    Immature Grans % 0.3 0.0 - 0.5 %    Neutrophils, Absolute 3.74 1.70 - 7.00 10*3/mm3    Lymphocytes, Absolute 1.79 0.70 - 3.10 10*3/mm3    Monocytes, Absolute 0.58 0.10 - 0.90 10*3/mm3    Eosinophils, Absolute 0.02 0.00 - 0.40 10*3/mm3    Basophils, Absolute 0.04 0.00 - 0.20 10*3/mm3    Immature Grans, Absolute 0.02 0.00 - 0.05 10*3/mm3    nRBC 0.0 0.0 - 0.2 /100 WBC       Ordered the above labs and independently reviewed the results.        RADIOLOGY  No Radiology Exams Resulted Within Past 24 Hours    I ordered the above noted radiological studies. Reviewed by me and discussed with radiologist.  See dictation for official radiology interpretation.      PROCEDURES    Procedures      MEDICATIONS GIVEN IN ER    Medications   sodium chloride 0.9 % flush 10 mL (has no administration in time range)         PROGRESS, DATA ANALYSIS, CONSULTS, AND MEDICAL DECISION MAKING    All labs have been independently reviewed by me.  All radiology studies have been  reviewed by me and discussed with radiologist dictating the report.   EKG's independently viewed and interpreted by me.  Discussion below represents my analysis of pertinent findings related to patient's condition, differential diagnosis, treatment plan and final disposition.    Differential diagnosis includes dysfunctional uterine bleeding, malignancy, local irritation.  Her hemoglobin is normal.  Other that she is appropriate for outpatient management.    Her blood pressure is remained in a very reassuring range.  She has a reassuring neurological examination.  This is a chronic issue for her.  She has appropriate follow-up already scheduled.    ED Course as of Oct 08 1222   Fri Oct 08, 2021   1138 WBC: 6.19 [TD]   1139 Hemoglobin: 12.2 [TD]   1221 Creatinine: 0.74 [TD]   1221 Sodium: 137 [TD]   1221 Potassium: 3.5 [TD]   1221 I performed a pelvic exam.  Patient has no active bleeding through the cervical os.  There is some pooled blood in the vaginal vault.  Number that she is appropriate for outpatient management with gynecology.  I gave her good return precautions.    [TD]      ED Course User Index  [TD] Winston Haddad II, MD           PPE: The patient wore a surgical mask throughout the entire patient encounter. I wore an N95.    AS OF 12:22 EDT VITALS:    BP - 149/83  HR - 55  TEMP - 97.8 °F (36.6 °C)  O2 SATS - 97%        DIAGNOSIS  Final diagnoses:   Dysfunctional uterine bleeding   Primary hypertension         DISPOSITION  DISCHARGE    FOLLOW-UP  Rowena Valerio APRN  [address] [facility] [address]  [facility] [id number] [phone]    Schedule an appointment as soon as possible for a visit   As needed    Kristi Powell, APRN  [address] [facility] [address]  [facility] [id number] [phone]    Schedule an appointment as soon as possible for a visit in 2 weeks      Romain Capps MD  [address] [facility] [address] [phone]    Schedule an appointment as soon as possible for a visit in 1  week  for your vaginal bleeding         Medication List      Changed    hydrALAZINE 50 MG tablet  Commonly known as: APRESOLINE  Take 1 tablet by mouth 3 (Three) Times a Day.  What changed: how much to take                     Winston Haddad II, MD  10/08/21 [id number]

## 2021-10-08 NOTE — ED NOTES
Pt presents to ED with complaints of vaginal bleeding and blood pressure problems. Pt reports her blood pressure has been elevated and she has been seeing her doctor for this. PT reports this has been going on for the last three weeks. Pt is A&OX4, able to ambulate, and in a mask at this time.    Patient was placed in face mask during first look triage.  Patient was wearing a face mask throughout encounter.  I wore personal protective equipment throughout the encounter.  Hand hygiene was performed before and after patient encounter.       Andrew Haddad, RN  10/08/21 [id number]

## 2021-10-13 DIAGNOSIS — E78.5 HYPERLIPIDEMIA, UNSPECIFIED HYPERLIPIDEMIA TYPE: ICD-10-CM

## 2021-10-13 DIAGNOSIS — D75.839 THROMBOCYTOSIS: Primary | ICD-10-CM

## 2021-10-13 LAB
ALBUMIN SERPL-MCNC: 4.5 G/DL (ref 3.5–5.2)
ALBUMIN/GLOB SERPL: 1.5 G/DL
ALP SERPL-CCNC: 104 U/L (ref 39–117)
ALT SERPL-CCNC: 19 U/L (ref 1–33)
AST SERPL-CCNC: 16 U/L (ref 1–32)
BASOPHILS # BLD AUTO: 0.05 10*3/MM3 (ref 0–0.2)
BASOPHILS NFR BLD AUTO: 0.9 % (ref 0–1.5)
BILIRUB SERPL-MCNC: 0.2 MG/DL (ref 0–1.2)
BUN SERPL-MCNC: 22 MG/DL (ref 6–20)
BUN/CREAT SERPL: 28.9 (ref 7–25)
CALCIUM SERPL-MCNC: 9.6 MG/DL (ref 8.6–10.5)
CHLORIDE SERPL-SCNC: 100 MMOL/L (ref 98–107)
CHOLEST SERPL-MCNC: 222 MG/DL (ref 0–200)
CO2 SERPL-SCNC: 27 MMOL/L (ref 22–29)
CREAT SERPL-MCNC: 0.76 MG/DL (ref 0.57–1)
EOSINOPHIL # BLD AUTO: 0.03 10*3/MM3 (ref 0–0.4)
EOSINOPHIL NFR BLD AUTO: 0.5 % (ref 0.3–6.2)
ERYTHROCYTE [DISTWIDTH] IN BLOOD BY AUTOMATED COUNT: 14.2 % (ref 12.3–15.4)
GLOBULIN SER CALC-MCNC: 3 GM/DL
GLUCOSE SERPL-MCNC: 77 MG/DL (ref 65–99)
HBA1C MFR BLD: 5.8 % (ref 4.8–5.6)
HCT VFR BLD AUTO: 35.1 % (ref 34–46.6)
HDLC SERPL-MCNC: 54 MG/DL (ref 40–60)
HGB BLD-MCNC: 11.8 G/DL (ref 12–15.9)
IMM GRANULOCYTES # BLD AUTO: 0.01 10*3/MM3 (ref 0–0.05)
IMM GRANULOCYTES NFR BLD AUTO: 0.2 % (ref 0–0.5)
LDLC SERPL CALC-MCNC: 151 MG/DL (ref 0–100)
LYMPHOCYTES # BLD AUTO: 2.36 10*3/MM3 (ref 0.7–3.1)
LYMPHOCYTES NFR BLD AUTO: 43 % (ref 19.6–45.3)
MCH RBC QN AUTO: 28.9 PG (ref 26.6–33)
MCHC RBC AUTO-ENTMCNC: 33.6 G/DL (ref 31.5–35.7)
MCV RBC AUTO: 85.8 FL (ref 79–97)
MONOCYTES # BLD AUTO: 0.72 10*3/MM3 (ref 0.1–0.9)
MONOCYTES NFR BLD AUTO: 13.1 % (ref 5–12)
NEUTROPHILS # BLD AUTO: 2.32 10*3/MM3 (ref 1.7–7)
NEUTROPHILS NFR BLD AUTO: 42.3 % (ref 42.7–76)
NRBC BLD AUTO-RTO: 0 /100 WBC (ref 0–0.2)
PLATELET # BLD AUTO: 505 10*3/MM3 (ref 140–450)
POTASSIUM SERPL-SCNC: 3.8 MMOL/L (ref 3.5–5.2)
PROT SERPL-MCNC: 7.5 G/DL (ref 6–8.5)
RBC # BLD AUTO: 4.09 10*6/MM3 (ref 3.77–5.28)
SODIUM SERPL-SCNC: 137 MMOL/L (ref 136–145)
TRIGL SERPL-MCNC: 98 MG/DL (ref 0–150)
TSH SERPL DL<=0.005 MIU/L-ACNC: 1.57 UIU/ML (ref 0.27–4.2)
VLDLC SERPL CALC-MCNC: 17 MG/DL (ref 5–40)
WBC # BLD AUTO: 5.49 10*3/MM3 (ref 3.4–10.8)

## 2021-10-13 NOTE — PROGRESS NOTES
Her total cholesterol and LDL have improved but they are still elevated. I need her to keep working on a low-cholesterol diet and daily exercise. I want to re-check a lipid profile in 3 months. Please let the patient know she needs to make a lab appointment for three months.     Her platelet level is still high and there are a few labs that are abnormal on her blood count, so I will order a referral to Hematology.     Her hemoglobin A1C improved and is still in the pre-diabetes range, so she needs to work on a low-carbohydrate diet and avoid sweets. I will re-check this at her next appointment.

## 2021-10-14 ENCOUNTER — OFFICE VISIT (OUTPATIENT)
Dept: CARDIOLOGY | Facility: CLINIC | Age: 61
End: 2021-10-14

## 2021-10-14 VITALS
HEART RATE: 62 BPM | BODY MASS INDEX: 38.32 KG/M2 | HEIGHT: 65 IN | SYSTOLIC BLOOD PRESSURE: 138 MMHG | WEIGHT: 230 LBS | OXYGEN SATURATION: 98 % | DIASTOLIC BLOOD PRESSURE: 80 MMHG

## 2021-10-14 DIAGNOSIS — Z86.73 HISTORY OF STROKE: ICD-10-CM

## 2021-10-14 DIAGNOSIS — I10 HYPERTENSION, UNSPECIFIED TYPE: Primary | ICD-10-CM

## 2021-10-14 DIAGNOSIS — I25.10 CORONARY ARTERY DISEASE INVOLVING NATIVE CORONARY ARTERY OF NATIVE HEART WITHOUT ANGINA PECTORIS: ICD-10-CM

## 2021-10-14 PROCEDURE — 99214 OFFICE O/P EST MOD 30 MIN: CPT | Performed by: NURSE PRACTITIONER

## 2021-10-14 NOTE — PROGRESS NOTES
CARDIOLOGY        Patient Name: Henny James  :1960  Age: 61 y.o.  Primary Cardiologist: Jorge Alberto Rodriguez MD  Encounter Provider:  ANASTASIIA Salcido    Date of Service: 21          CHIEF COMPLAINT / REASON FOR OFFICE VISIT     Hypertension (1 wk f/u )      HISTORY OF PRESENT ILLNESS       Hypertension  Pertinent negatives include no headaches or malaise/fatigue.     Henny James is a 61 y.o. female who presents today for reevaluation of hypertension.     Pt has a  history significant for CAD, atrial fibrillation, hypertension, history of stroke.    Patient has been followed by myself and Dr. Rodriguez for hypertensive urgency and uncontrolled hypertension.  We have had a very difficult time getting her blood pressure to goal.  Patient has intolerance to spironolactone as it caused her extreme fatigue, so bad that she could not complete ADLs.  She had weight gain with metoprolol.  At last visit Edarbyclor was added to her regimen.  Patient states that since last week she has noted improvement of her symptoms.  She is very symptomatic with episodes of lightheadedness, headache and extreme fatigue when her systolic blood pressure is greater than 150.  She reports that over the past week she has felt well.  She admits that she stopped taking her blood pressure because she got so disappointed when the readings were still elevated.  She states that this is the best that she has felt in several months now that her blood pressure is more controlled and on this medication.  Patient did have outpatient laboratory studies dated 10/12 that revealed normal creatinine and normal potassium levels.  She is currently asymptomatic and denies chest pain, shortness of breath at rest or with exertion, palpitations, lightheadedness, lower extremity edema, fatigue.    The following portions of the patient's history were reviewed and updated as appropriate: allergies, current medications, past family history, past  "medical history, past social history, past surgical history and problem list.      VITAL SIGNS     Visit Vitals  /80 (BP Location: Left arm, Patient Position: Sitting, Cuff Size: Large Adult)   Pulse 62   Ht 165.1 cm (65\")   Wt 104 kg (230 lb)   SpO2 98%   BMI 38.27 kg/m²         Wt Readings from Last 3 Encounters:   10/14/21 104 kg (230 lb)   10/06/21 106 kg (232 lb 9.6 oz)   10/05/21 107 kg (235 lb)     Body mass index is 38.27 kg/m².      REVIEW OF SYSTEMS   Review of Systems   Constitutional: Negative for chills, fever, malaise/fatigue, weight gain and weight loss.   Cardiovascular: Negative for leg swelling.   Respiratory: Negative for cough, snoring and wheezing.    Hematologic/Lymphatic: Negative for bleeding problem. Does not bruise/bleed easily.   Skin: Negative for color change.   Musculoskeletal: Negative for falls, joint pain and myalgias.   Gastrointestinal: Negative for melena.   Genitourinary: Negative for hematuria.   Neurological: Negative for excessive daytime sleepiness, headaches and light-headedness.   Psychiatric/Behavioral: Negative for depression. The patient is not nervous/anxious.            PHYSICAL EXAMINATION     Vitals and nursing note reviewed.   Constitutional:       Appearance: Normal appearance. Well-developed.   Eyes:      Conjunctiva/sclera: Conjunctivae normal.   Neck:      Vascular: No carotid bruit.   Pulmonary:      Breath sounds: Normal breath sounds.   Cardiovascular:      Normal rate. Regular rhythm. Normal S1 with normal intensity. Normal S2 with normal intensity.      Murmurs: There is no murmur.      No gallop. No click. No rub.   Edema:     Peripheral edema absent.   Musculoskeletal: Normal range of motion. Skin:     General: Skin is warm and dry.   Neurological:      Mental Status: Alert and oriented to person, place, and time.      GCS: GCS eye subscore is 4. GCS verbal subscore is 5. GCS motor subscore is 6.   Psychiatric:         Speech: Speech normal.         " Behavior: Behavior normal.         Thought Content: Thought content normal.         Judgment: Judgment normal.           REVIEWED DATA     Procedures    Cardiac Procedures:  1. Echocardiogram 6/7/2021.  LVEF 65%.  Mild LVH.  Mild aortic valve regurgitation.  Grade 1 diastolic dysfunction.    Lipid Panel    Lipid Panel 10/12/21   Total Cholesterol 222 (A)   Triglycerides 98   HDL Cholesterol 54   VLDL Cholesterol 17   LDL Cholesterol  151 (A)   (A) Abnormal value       Comments are available for some flowsheets but are not being displayed.               ASSESSMENT & PLAN      Diagnosis Plan   1. Hypertension, unspecified type     2. Coronary artery disease involving native coronary artery of native heart without angina pectoris     3. History of stroke           SUMMARY/DISCUSSION  1. Hypertension.   Patient has been following both Dr. Rodriguez and myself for hypertensive urgency and very uncontrolled high blood pressure.  Patient has symptoms of headache, extreme fatigue and lightheadedness when blood pressure is greater than 150 systolic.  She has had multiple occasions where she is need to go to to our urgent clinic to receive IV antihypertensives.  It is been very difficult to get patient on a regimen that she can tolerate and control her blood pressure.  She had extreme fatigue that interrupted daily living activities on spironolactone and weight gain with metoprolol.  She will continue amlodipine 15 mg/day, carvedilol 25 mg twice per day, hydralazine 50 mg 3 times per day,edarbyclor 40/12.5 mg twice per day.  Patient has previously been on max dose of losartan without adequate blood pressure control.  She was placed on azlisartan as it has been proven more effective in lowering systolic blood pressure, specifically in -American patients compared to other ARB's.  She has tolerated this medication very well and actually states that this is the best that she has felt in some time.  She did not have adequate  control with azlisartan alone, but did reach goal blood pressure when this was combined with chlorthalidone.  I think that it is imperative that patient continue this medication at the current dose of 40/12.5 mg twice per day.  She has history of coronary disease as well as history of stroke so adequate blood pressure control is imperative to reduce further cardiovascular episodes.  2. History of CAD.  Currently denies any angina.  Continue aspirin.  3. History of stroke.  4. Follow-up with Dr. Rodriguez in 2 months.        MEDICATIONS         Discharge Medications          Accurate as of October 14, 2021 11:26 AM. If you have any questions, ask your nurse or doctor.            Changes to Medications      Instructions Start Date   hydrALAZINE 50 MG tablet  Commonly known as: APRESOLINE  What changed: how much to take   50 mg, Oral, 3 Times Daily         Continue These Medications      Instructions Start Date   amLODIPine 10 MG tablet  Commonly known as: NORVASC   10 mg, Oral, Daily      ARIPiprazole 10 MG tablet  Commonly known as: ABILIFY   10 mg, Oral, Nightly      aspirin 81 MG chewable tablet   81 mg, Oral, Daily      Azilsartan-Chlorthalidone 40-12.5 MG tablet   1 tablet, Oral, 2 times daily      B-12 PO   Oral      carvedilol 25 MG tablet  Commonly known as: COREG   25 mg, Oral, 2 Times Daily      lamoTRIgine 200 MG tablet  Commonly known as: LaMICtal   200 mg, Oral, Nightly      traZODone 50 MG tablet  Commonly known as: DESYREL   No dose, route, or frequency recorded.      Xanax 1 MG tablet  Generic drug: ALPRAZolam   1 mg, Oral, 3 Times Daily PRN                 **Dragon Disclaimer:   Much of this encounter note is an electronic transcription/translation of spoken language to printed text. The electronic translation of spoken language may permit erroneous, or at times, nonsensical words or phrases to be inadvertently transcribed. Although I have reviewed the note for such errors, some may still exist.

## 2021-10-18 RX ORDER — HYDRALAZINE HYDROCHLORIDE 50 MG/1
75 TABLET, FILM COATED ORAL 3 TIMES DAILY
Qty: 405 TABLET | Refills: 3 | Status: SHIPPED | OUTPATIENT
Start: 2021-10-18 | End: 2022-11-09 | Stop reason: SDUPTHER

## 2021-10-18 RX ORDER — LOSARTAN POTASSIUM 100 MG/1
TABLET ORAL
Qty: 30 TABLET | OUTPATIENT
Start: 2021-10-18

## 2021-10-18 NOTE — TELEPHONE ENCOUNTER
Pt called LVM, she needs a refill on her HCTZ since it was recently increased. Will send refill will the corrected dose for pt. //HG

## 2022-02-24 RX ORDER — AMLODIPINE BESYLATE 10 MG/1
TABLET ORAL
Qty: 15 TABLET | Refills: 0 | OUTPATIENT
Start: 2022-02-24

## 2022-02-28 RX ORDER — AMLODIPINE BESYLATE 10 MG/1
TABLET ORAL
Qty: 30 TABLET | Refills: 2 | Status: SHIPPED | OUTPATIENT
Start: 2022-02-28 | End: 2022-06-28 | Stop reason: SDUPTHER

## 2022-06-28 ENCOUNTER — TELEPHONE (OUTPATIENT)
Dept: FAMILY MEDICINE CLINIC | Facility: CLINIC | Age: 62
End: 2022-06-28

## 2022-06-28 RX ORDER — AMLODIPINE BESYLATE 10 MG/1
10 TABLET ORAL DAILY
Qty: 30 TABLET | Refills: 0 | Status: SHIPPED | OUTPATIENT
Start: 2022-06-28 | End: 2022-11-09 | Stop reason: SDUPTHER

## 2022-08-02 ENCOUNTER — TRANSCRIBE ORDERS (OUTPATIENT)
Dept: LAB | Facility: HOSPITAL | Age: 62
End: 2022-08-02

## 2022-08-02 ENCOUNTER — HOSPITAL ENCOUNTER (OUTPATIENT)
Dept: CARDIOLOGY | Facility: HOSPITAL | Age: 62
Discharge: HOME | End: 2022-08-02

## 2022-08-02 ENCOUNTER — LAB (OUTPATIENT)
Dept: LAB | Facility: HOSPITAL | Age: 62
End: 2022-08-02

## 2022-08-02 DIAGNOSIS — I10 ESSENTIAL HYPERTENSION, MALIGNANT: Primary | ICD-10-CM

## 2022-08-02 DIAGNOSIS — I10 ESSENTIAL HYPERTENSION, MALIGNANT: ICD-10-CM

## 2022-08-02 LAB
ANION GAP SERPL CALCULATED.3IONS-SCNC: 16 MMOL/L (ref 5–15)
BUN SERPL-MCNC: 10 MG/DL (ref 8–23)
BUN/CREAT SERPL: 19.6 (ref 7–25)
CALCIUM SPEC-SCNC: 9.7 MG/DL (ref 8.6–10.5)
CHLORIDE SERPL-SCNC: 100 MMOL/L (ref 98–107)
CO2 SERPL-SCNC: 24 MMOL/L (ref 22–29)
CREAT SERPL-MCNC: 0.51 MG/DL (ref 0.57–1)
DEPRECATED RDW RBC AUTO: 45.6 FL (ref 37–54)
EGFRCR SERPLBLD CKD-EPI 2021: 107 ML/MIN/1.73
ERYTHROCYTE [DISTWIDTH] IN BLOOD BY AUTOMATED COUNT: 14.2 % (ref 12.3–15.4)
GLUCOSE SERPL-MCNC: 121 MG/DL (ref 65–99)
HCT VFR BLD AUTO: 37.4 % (ref 34–46.6)
HGB BLD-MCNC: 11.9 G/DL (ref 12–15.9)
MCH RBC QN AUTO: 28.3 PG (ref 26.6–33)
MCHC RBC AUTO-ENTMCNC: 31.8 G/DL (ref 31.5–35.7)
MCV RBC AUTO: 88.8 FL (ref 79–97)
PLATELET # BLD AUTO: 306 10*3/MM3 (ref 140–450)
PMV BLD AUTO: 11.1 FL (ref 6–12)
POTASSIUM SERPL-SCNC: 3.4 MMOL/L (ref 3.5–5.2)
QT INTERVAL: 446 MS
RBC # BLD AUTO: 4.21 10*6/MM3 (ref 3.77–5.28)
SODIUM SERPL-SCNC: 140 MMOL/L (ref 136–145)
WBC NRBC COR # BLD: 21.23 10*3/MM3 (ref 3.4–10.8)

## 2022-08-02 PROCEDURE — 85027 COMPLETE CBC AUTOMATED: CPT

## 2022-08-02 PROCEDURE — 93010 ELECTROCARDIOGRAM REPORT: CPT | Performed by: INTERNAL MEDICINE

## 2022-08-02 PROCEDURE — 93005 ELECTROCARDIOGRAM TRACING: CPT | Performed by: PODIATRIST

## 2022-08-02 PROCEDURE — 36415 COLL VENOUS BLD VENIPUNCTURE: CPT

## 2022-08-02 PROCEDURE — 80048 BASIC METABOLIC PNL TOTAL CA: CPT

## 2022-08-10 PROCEDURE — 88311 DECALCIFY TISSUE: CPT | Performed by: PODIATRIST

## 2022-08-10 PROCEDURE — 88305 TISSUE EXAM BY PATHOLOGIST: CPT | Performed by: PODIATRIST

## 2022-08-11 ENCOUNTER — LAB REQUISITION (OUTPATIENT)
Dept: LAB | Facility: HOSPITAL | Age: 62
End: 2022-08-11

## 2022-08-11 DIAGNOSIS — M77.31 CALCANEAL SPUR, RIGHT FOOT: ICD-10-CM

## 2022-08-11 DIAGNOSIS — M66.361 SPONTANEOUS RUPTURE OF FLEXOR TENDONS, RIGHT LOWER LEG: ICD-10-CM

## 2022-08-12 LAB
LAB AP CASE REPORT: NORMAL
PATH REPORT.FINAL DX SPEC: NORMAL
PATH REPORT.GROSS SPEC: NORMAL

## 2022-11-03 RX ORDER — CARVEDILOL 25 MG/1
TABLET ORAL
Qty: 60 TABLET | Refills: 0 | Status: SHIPPED | OUTPATIENT
Start: 2022-11-03 | End: 2022-11-09 | Stop reason: SDUPTHER

## 2022-11-08 NOTE — PROGRESS NOTES
Date of Office Visit: 2022  Encounter Provider: ANASTASIIA Berg  Place of Service: [facility] CARDIOLOGY  Patient Name: Henny James  :1960    No chief complaint on file.  : follow up hypertension    HPI: Henny James is a 62 y.o. female who is a patient of  Dr. Rodriguez.  She is new to me today presents for follow-up for hypertension.  She has a significant history for coronary artery disease, atrial fibrillation, hypertension, hyperlipidemia and history of stroke.  Patient has had a difficult time getting control of her blood pressure.  She has intolerance to spironolactone as it caused her extreme fatigue.  She had weight gain with metoprolol.  On recent visit, Edarbyclor was added to her regimen.  She noticed improvement in her symptoms.  She was last seen on 10/14/2021 and was currently asymptomatic and feeling well.    2D echocardiogram 2021 showed normal left ventricular systolic function, mild LVH, grade 1 diastolic dysfunction and mild aortic insufficiency. Walking lexiscan stress test 2019 showed normal perfusion with exception of mildly decreased perfusion a small area of distal inferior wall, EF 52%.  Left heart cath was performed on 10/3/2005 due to markedly elevated blood pressure and abnormal stress echo.  Cath showed nonobstructive coronary disease, left ventricular hypertrophy, abdominal aortic atherosclerosis and normal LV systolic function.    Presents today with no complaints of chest pain, pressure or dizziness.  She notes that she does not check her blood pressure at home because she is bipolar and it makes her depressed.  Her blood pressure is 178/110 today.  She has been out of her amlodipine for about 2 to 3 months.  She has been out of her Edarbyclor for about a month her carvedilol and hydralazine for few days.  We had lengthy discussion about how important it is to take her medications and check her blood pressure at home.   Tells me that the Edarbyclor is very expensive.  I gave her the option of starting her on chlorthalidone and another ARB in its place, and she declined.      Previous testing and notes have been reviewed by me.   Past Medical History:   Diagnosis Date   • Anxiety    • Arthritis    • Atrial fibrillation (HCC)    • Bipolar 1 disorder (HCC)    • CAD (coronary artery disease)    • COVID-19    • Depression    • Heart palpitations    • History of cocaine abuse (HCC)    • History of gastric cancer 10/2011    S/P Resection & pathology ([id number]) confirmed malignant GIST, measuring 7 cm in greast dimension. Treated w/ Adjuvant w/ Imatinib from 2011 - Oct 2012. Pt discontinued Imatinib in Sep/Oct 2012 by her own choosing citing the side-effects included distention of the stomach and absence of menses.   • History of gastrointestinal bleeding    • History of kidney surgery    • History of myocardial infarction    • History of suicide attempt    • History of thrombocytosis 2015    JAK2 V617F Mutation NOT DETECTED    • Hypercholesteremia    • Hypersomnia    • Hypertension    • Hypertensive urgency    • Obesity (BMI 30-39.9)    • Palpitations    • Psychophysiological insomnia    • Stroke (HCC)    • Thrombus of left atrial appendage 10/2011    She was on Coumadin but she discontinued on her own. Instead she started herself on 2 tablets of 81 milligrams aspirin daily regimen.   • TIA (transient ischemic attack)    • Uncontrolled hypertension    • Vision disturbance        Past Surgical History:   Procedure Laterality Date   • CARDIAC CATHETERIZATION     •  SECTION     • ENDOMETRIAL BIOPSY N/A 01/10/2020    Negative for Atypia, Negative for Malignancy - Mohit Fay   • EXPLORATORY LAPAROTOMY N/A 10/17/2011    w/ Resection of Gastrointestinal Stromal Tumor   • SPLENECTOMY     • TONSILLECTOMY     • TUBAL ABDOMINAL LIGATION         Social History     Socioeconomic History   • Marital status:     • Number of children: 5   • Years of education: 13   Tobacco Use   • Smoking status: Never   • Smokeless tobacco: Never   • Tobacco comments:     decaf coffee, 1 ginger ale   Substance and Sexual Activity   • Alcohol use: Yes     Comment: SOCIALLY   • Drug use: Yes     Types: Marijuana     Comment: STOPPED COCAINE 8 YEARS AGO   • Sexual activity: Defer       Family History   Problem Relation Age of Onset   • Bipolar disorder Daughter    • Hypertension Son    • Alcohol abuse Son    • Hypertension Father         Weight loss - helped no longer hypertensive   • Hypertension Paternal Uncle    • Stroke Paternal Uncle        Review of Systems   Constitutional: Negative.   HENT: Negative.    Eyes: Negative.    Cardiovascular: Negative.    Respiratory: Negative.    Endocrine: Negative.    Hematologic/Lymphatic: Negative.    Skin: Negative.    Musculoskeletal: Negative.    Psychiatric/Behavioral: Positive for depression. The patient is nervous/anxious.    Allergic/Immunologic: Negative.        Allergies   Allergen Reactions   • Metoprolol Succinate [Metoprolol] Other (See Comments)     Weight gain   • Spironolactone Other (See Comments)     fatigue         Current Outpatient Medications:   •  ALPRAZolam (XANAX) 1 MG tablet, Take 1 mg by mouth 3 (Three) Times a Day As Needed for Anxiety., Disp: , Rfl:   •  amLODIPine (NORVASC) 10 MG tablet, Take 1 tablet by mouth Daily., Disp: 30 tablet, Rfl: 5  •  ARIPiprazole (ABILIFY) 10 MG tablet, Take 10 mg by mouth Every Night., Disp: , Rfl:   •  aspirin 81 MG chewable tablet, Chew 81 mg Daily., Disp: , Rfl:   •  Azilsartan-Chlorthalidone 40-12.5 MG tablet, Take 1 tablet by mouth 2 (Two) Times a Day., Disp: 60 tablet, Rfl: 5  •  carvedilol (COREG) 25 MG tablet, Take 1 tablet by mouth 2 (Two) Times a Day., Disp: 60 tablet, Rfl: 5  •  Cyanocobalamin (B-12 PO), Take  by mouth., Disp: , Rfl:   •  hydrALAZINE (APRESOLINE) 50 MG tablet, Take 1.5 tablets by mouth 3 (Three) Times a  "Day., Disp: 405 tablet, Rfl: 3  •  lamoTRIgine (LaMICtal) 200 MG tablet, Take 200 mg by mouth Every Night., Disp: , Rfl:   •  traZODone (DESYREL) 50 MG tablet, , Disp: , Rfl:       Objective:     Vitals:    11/09/22 1053   BP: (!) 178/110   Pulse: 68   Weight: 9.988 kg (22 lb 0.3 oz)   Height: 165.1 cm (65\")     Body mass index is 3.66 kg/m².    PHYSICAL EXAM:    Constitutional:       Appearance: Healthy appearance. Not in distress.   Neck:      Vascular: No JVR. JVD normal.   Pulmonary:      Effort: Pulmonary effort is normal.      Breath sounds: Normal breath sounds. No wheezing. No rhonchi. No rales.   Chest:      Chest wall: Not tender to palpatation.   Cardiovascular:      PMI at left midclavicular line. Normal rate. Regular rhythm. Normal S1. Normal S2.      Murmurs: There is no murmur.      No gallop. No click. No rub.   Pulses:     Intact distal pulses.   Edema:     Peripheral edema absent.   Abdominal:      General: Bowel sounds are normal.      Palpations: Abdomen is soft.      Tenderness: There is no abdominal tenderness.   Musculoskeletal: Normal range of motion.         General: No tenderness. Skin:     General: Skin is warm and dry.   Neurological:      General: No focal deficit present.      Mental Status: Alert and oriented to person, place and time.           ECG 12 Lead    Date/Time: 11/9/2022 11:34 AM  Performed by: Amelia Zavaleta APRN  Authorized by: Amelia Zavaleta APRN   Comparison: compared with previous ECG from 8/2/2022  Similar to previous ECG  Rhythm: sinus rhythm  Rate: normal  BPM: 68  ST Segments: ST segments normal  T Waves: T waves normal  Other findings: left ventricular hypertrophy and left atrial abnormality              Assessment:       Diagnosis Plan   1. Coronary artery disease involving native coronary artery of native heart without angina pectoris  ECG 12 Lead      2. Hyperlipidemia, unspecified hyperlipidemia type  ECG 12 Lead      3. Uncontrolled hypertension  ECG " 12 Lead      4. Palpitations        5. Bipolar 1 disorder (HCC)        6. Noncompliance with medication regimen          Orders Placed This Encounter   Procedures   • ECG 12 Lead     This order was created via procedure documentation     Order Specific Question:   Release to patient     Answer:   Routine Release          Plan:       1. Hypertension: Controlled.  Patient has been out of her medications for quite some time.  All medications refilled.  I did give her the option of starting her on chlorthalidone and another ARB in the place of Edarbyclor due to cost.  Patient will let me know if it is too expensive when she goes to the pharmacy and I will do that.  At this time she wanted to continue on  Edarbyclor.  She will check her blood pressure at least 2-3 times a week.  2. Nonobstructive CAD: No angina.  On aspirin and beta-blocker  3. History of stroke: [place] to  4.  Hyperlipidemia: Panel 1 year ago shows total cholesterol 222, triglycerides 98, HDL 54 and .  5.  Bipolar disorder  6.  Medication noncompliance  7.  History of substance abuse: Mocks marijuana.  States that she stopped doing cocaine about 8 years ago    Ms. James will follow up with either ANASTASIIA Lagunas or Dr. Tran in 3 months.  She will call me if she is unable to get her Edarbyclor, we will change her to 2 separate medications.         Your medication list          Accurate as of November 9, 2022 11:38 AM. If you have any questions, ask your nurse or doctor.            CHANGE how you take these medications      Instructions Last Dose Given Next Dose Due   Azilsartan-Chlorthalidone 40-12.5 MG tablet  What changed: when to take this  Changed by: ANASTASIIA Berg      Take 1 tablet by mouth 2 (Two) Times a Day.          CONTINUE taking these medications      Instructions Last Dose Given Next Dose Due   ALPRAZolam 1 MG tablet  Commonly known as: XANAX      Take 1 mg by mouth 3 (Three) Times a Day As Needed for  Anxiety.       amLODIPine 10 MG tablet  Commonly known as: NORVASC      Take 1 tablet by mouth Daily.       ARIPiprazole 10 MG tablet  Commonly known as: ABILIFY      Take 10 mg by mouth Every Night.       aspirin 81 MG chewable tablet      Chew 81 mg Daily.       B-12 PO      Take  by mouth.       carvedilol 25 MG tablet  Commonly known as: COREG      Take 1 tablet by mouth 2 (Two) Times a Day.       hydrALAZINE 50 MG tablet  Commonly known as: APRESOLINE      Take 1.5 tablets by mouth 3 (Three) Times a Day.       lamoTRIgine 200 MG tablet  Commonly known as: LaMICtal      Take 200 mg by mouth Every Night.       traZODone 50 MG tablet  Commonly known as: DESYREL                 Where to Get Your Medications      These medications were sent to [id number] - [place], [place] - [address] [facility] [phone] [facility] [phone]   [address] , [facility] [address]    Phone: [phone]   · amLODIPine 10 MG tablet  · Azilsartan-Chlorthalidone 40-12.5 MG tablet  · carvedilol 25 MG tablet  · hydrALAZINE 50 MG tablet           As always, it has been a pleasure to participate in your patient's care.      Sincerely,       ANASTASIIA Crews

## 2022-11-09 ENCOUNTER — OFFICE VISIT (OUTPATIENT)
Dept: FAMILY MEDICINE CLINIC | Facility: CLINIC | Age: 62
End: 2022-11-09

## 2022-11-09 ENCOUNTER — OFFICE VISIT (OUTPATIENT)
Dept: CARDIOLOGY | Facility: CLINIC | Age: 62
End: 2022-11-09

## 2022-11-09 VITALS
TEMPERATURE: 97.3 F | HEART RATE: 72 BPM | DIASTOLIC BLOOD PRESSURE: 108 MMHG | SYSTOLIC BLOOD PRESSURE: 178 MMHG | WEIGHT: 221 LBS | BODY MASS INDEX: 36.82 KG/M2 | RESPIRATION RATE: 16 BRPM | HEIGHT: 65 IN | OXYGEN SATURATION: 97 %

## 2022-11-09 VITALS
WEIGHT: 22.02 LBS | DIASTOLIC BLOOD PRESSURE: 110 MMHG | HEART RATE: 68 BPM | HEIGHT: 65 IN | SYSTOLIC BLOOD PRESSURE: 178 MMHG | BODY MASS INDEX: 3.67 KG/M2

## 2022-11-09 DIAGNOSIS — Z12.11 SCREEN FOR COLON CANCER: ICD-10-CM

## 2022-11-09 DIAGNOSIS — R73.03 PREDIABETES: ICD-10-CM

## 2022-11-09 DIAGNOSIS — R00.2 PALPITATIONS: ICD-10-CM

## 2022-11-09 DIAGNOSIS — F31.9 BIPOLAR 1 DISORDER: ICD-10-CM

## 2022-11-09 DIAGNOSIS — I10 UNCONTROLLED HYPERTENSION: ICD-10-CM

## 2022-11-09 DIAGNOSIS — I25.10 CORONARY ARTERY DISEASE INVOLVING NATIVE CORONARY ARTERY OF NATIVE HEART WITHOUT ANGINA PECTORIS: Primary | ICD-10-CM

## 2022-11-09 DIAGNOSIS — E78.5 HYPERLIPIDEMIA, UNSPECIFIED HYPERLIPIDEMIA TYPE: ICD-10-CM

## 2022-11-09 DIAGNOSIS — Z91.14 NONCOMPLIANCE WITH MEDICATION REGIMEN: ICD-10-CM

## 2022-11-09 DIAGNOSIS — Z00.00 ANNUAL PHYSICAL EXAM: Primary | ICD-10-CM

## 2022-11-09 PROBLEM — Z91.148 NONCOMPLIANCE WITH MEDICATION REGIMEN: Status: ACTIVE | Noted: 2022-11-09

## 2022-11-09 PROCEDURE — 99396 PREV VISIT EST AGE 40-64: CPT

## 2022-11-09 PROCEDURE — 99214 OFFICE O/P EST MOD 30 MIN: CPT | Performed by: NURSE PRACTITIONER

## 2022-11-09 PROCEDURE — 93000 ELECTROCARDIOGRAM COMPLETE: CPT | Performed by: NURSE PRACTITIONER

## 2022-11-09 PROCEDURE — 99213 OFFICE O/P EST LOW 20 MIN: CPT

## 2022-11-09 PROCEDURE — 93000 ELECTROCARDIOGRAM COMPLETE: CPT

## 2022-11-09 RX ORDER — CARVEDILOL 25 MG/1
25 TABLET ORAL 2 TIMES DAILY
Qty: 60 TABLET | Refills: 5 | Status: SHIPPED | OUTPATIENT
Start: 2022-11-09 | End: 2023-02-13 | Stop reason: SDUPTHER

## 2022-11-09 RX ORDER — CLOTRIMAZOLE AND BETAMETHASONE DIPROPIONATE 10; .64 MG/G; MG/G
CREAM TOPICAL
COMMUNITY
Start: 2022-10-11

## 2022-11-09 RX ORDER — AMLODIPINE BESYLATE 10 MG/1
10 TABLET ORAL DAILY
Qty: 30 TABLET | Refills: 5 | Status: SHIPPED | OUTPATIENT
Start: 2022-11-09 | End: 2023-02-13 | Stop reason: SDUPTHER

## 2022-11-09 RX ORDER — HYDRALAZINE HYDROCHLORIDE 50 MG/1
75 TABLET, FILM COATED ORAL 3 TIMES DAILY
Qty: 405 TABLET | Refills: 3 | Status: SHIPPED | OUTPATIENT
Start: 2022-11-09 | End: 2023-02-13 | Stop reason: SDUPTHER

## 2022-11-09 NOTE — PROGRESS NOTES
Chief Complaint  Annual Exam    Subjective          History of Present Illness    Henny PEREZ Erika 62 y.o. female who presents for an Annual Wellness Visit.  She has a history of:   Patient Active Problem List   Diagnosis   • Abnormal urine finding   • Anemia   • Atrial fibrillation (HCC)   • CAD (coronary artery disease), native coronary artery   • Chest pain syndrome   • Chronic left hip pain   • Dizziness   • Fatigue   • HLD (hyperlipidemia)   • Hypertensive urgency   • Intermittent claudication (HCC)   • Long term (current) use of anticoagulants   • Lower abdominal pain   • Neoplasm of uncertain behavior of connective and other soft tissue   • Palpitations   • Personal history of other specified conditions presenting hazards to health   • Personal history of tobacco use, presenting hazards to health   • Thrombocytosis   • Transient ischemic attack (TIA), and cerebral infarction without residual deficits(V12.54)   • Uncontrolled hypertension   • Hypokalemia   • Bipolar 1 disorder (HCC)   • Prediabetes   • Psychophysiological insomnia   • Hypertensive encephalopathy   • Noncompliance with medication regimen   She has been feeling well.  I reviewed health maintenance with her as part of my preventative care plan.     Health Habits:  Vision exam: Up to date  Dental exam: Not up to date  Exercise: none  Alcohol:  socially  Tobacco: no, but she admits to smoking marijuana    The patient's blood pressure is elevated today.  She had an appointment with Cardiology this morning and blood pressure was 178/110 at that appointment as well.  The patient had not been taking her medication as directed.  The patient reports she was out of her medications for a while.  The patient did not contact me for blood pressure medication refills.  She does not monitor her blood pressure at home.  Cardiology is managing her blood pressure.  She is asymptomatic.  She denies chest pain, shortness of breath, palpitations, headaches, blurred  vision, dizziness, weakness, near-syncope, and syncope.    HPI from today's Cardiology appointment:  HPI: Henny James is a 62 y.o. female who is a patient of  Dr. Rodriguez.  She is new to me today presents for follow-up for hypertension.  She has a significant history for coronary artery disease, atrial fibrillation, hypertension, hyperlipidemia and history of stroke.  Patient has had a difficult time getting control of her blood pressure.  She has intolerance to spironolactone as it caused her extreme fatigue.  She had weight gain with metoprolol.  On recent visit, Edarbyclor was added to her regimen.  She noticed improvement in her symptoms.  She was last seen on 10/14/2021 and was currently asymptomatic and feeling well.     2D echocardiogram 6/7/2021 showed normal left ventricular systolic function, mild LVH, grade 1 diastolic dysfunction and mild aortic insufficiency. Walking lexiscan stress test 08/19/2019 showed normal perfusion with exception of mildly decreased perfusion a small area of distal inferior wall, EF 52%.  Left heart cath was performed on 10/3/2005 due to markedly elevated blood pressure and abnormal stress echo.  Cath showed nonobstructive coronary disease, left ventricular hypertrophy, abdominal aortic atherosclerosis and normal LV systolic function.     Presents today with no complaints of chest pain, pressure or dizziness.  She notes that she does not check her blood pressure at home because she is bipolar and it makes her depressed.  Her blood pressure is 178/110 today.  She has been out of her amlodipine for about 2 to 3 months.  She has been out of her Edarbyclor for about a month her carvedilol and hydralazine for few days.  We had lengthy discussion about how important it is to take her medications and check her blood pressure at home.  Tells me that the Edarbyclor is very expensive.  I gave her the option of starting her on chlorthalidone and another ARB in its place, and she declined.  "     ECG 12 Lead     Date/Time: 11/9/2022 11:34 AM  Performed by: Amelia Zavaleta APRN  Authorized by: Amelia Zavaleta APRN   Comparison: compared with previous ECG from 8/2/2022  Similar to previous ECG  Rhythm: sinus rhythm  Rate: normal  BPM: 68  ST Segments: ST segments normal  T Waves: T waves normal  Other findings: left ventricular hypertrophy and left atrial abnormality        She  denies medication side effects.    Review of Systems   Constitutional: Negative for chills, fatigue and fever.   HENT: Negative for congestion and sinus pressure.    Eyes: Negative for visual disturbance.   Respiratory: Negative for cough, chest tightness, shortness of breath and wheezing.    Cardiovascular: Negative for chest pain and palpitations.   Gastrointestinal: Negative for abdominal pain, constipation, diarrhea, nausea and vomiting.   Genitourinary: Negative for dysuria, frequency and urgency.   Musculoskeletal: Negative for back pain.   Skin: Negative for rash.   Neurological: Negative for dizziness, seizures, syncope, facial asymmetry, speech difficulty, weakness, light-headedness and numbness.   Psychiatric/Behavioral: Negative for behavioral problems and sleep disturbance.        Objective   Vital Signs:   BP (!) 178/108   Pulse 72   Temp 97.3 °F (36.3 °C) (Oral)   Resp 16   Ht 165.1 cm (65\")   Wt 100 kg (221 lb)   SpO2 97%   BMI 36.78 kg/m²      Class 2 Severe Obesity (BMI >=35 and <=39.9). Obesity-related health conditions include the following: hypertension, coronary heart disease, diabetes mellitus and dyslipidemias. Obesity is newly identified. BMI is is above average; BMI management plan is completed. We discussed portion control and increasing exercise.        Physical Exam  Vitals and nursing note reviewed.   Constitutional:       General: She is not in acute distress.     Appearance: Normal appearance. She is well-developed and well-groomed. She is obese. She is not ill-appearing, " toxic-appearing or diaphoretic.   HENT:      Head: Normocephalic and atraumatic. No right periorbital erythema or left periorbital erythema.      Right Ear: Tympanic membrane, ear canal and external ear normal. There is no impacted cerumen.      Left Ear: Tympanic membrane, ear canal and external ear normal. There is no impacted cerumen.      Nose: Nose normal. No congestion or rhinorrhea.      Mouth/Throat:      Mouth: Mucous membranes are moist.      Pharynx: Oropharynx is clear. No oropharyngeal exudate or posterior oropharyngeal erythema.   Eyes:      General: Vision grossly intact. No visual field deficit or scleral icterus.        Right eye: No discharge.         Left eye: No discharge.      Extraocular Movements: Extraocular movements intact.      Right eye: Normal extraocular motion and no nystagmus.      Left eye: Normal extraocular motion and no nystagmus.      Conjunctiva/sclera: Conjunctivae normal.      Right eye: Right conjunctiva is not injected. No exudate.     Left eye: Left conjunctiva is not injected. No exudate.     Pupils: Pupils are equal, round, and reactive to light.   Neck:      Thyroid: No thyromegaly.      Vascular: No carotid bruit.      Trachea: Trachea normal.   Cardiovascular:      Rate and Rhythm: Normal rate and regular rhythm.      Pulses: Normal pulses.           Carotid pulses are 2+ on the right side and 2+ on the left side.       Radial pulses are 2+ on the right side and 2+ on the left side.        Femoral pulses are 2+ on the right side and 2+ on the left side.       Popliteal pulses are 2+ on the right side and 2+ on the left side.        Dorsalis pedis pulses are 2+ on the right side and 2+ on the left side.        Posterior tibial pulses are 2+ on the right side and 2+ on the left side.      Heart sounds: Normal heart sounds, S1 normal and S2 normal. No murmur heard.  Pulmonary:      Effort: Pulmonary effort is normal. No respiratory distress.      Breath sounds: Normal  breath sounds. No stridor. No decreased breath sounds or wheezing.   Chest:      Chest wall: No tenderness or edema.   Abdominal:      General: Bowel sounds are normal. There is no distension.      Palpations: Abdomen is soft. There is no mass.      Tenderness: There is no abdominal tenderness. There is no guarding.   Musculoskeletal:         General: No swelling, tenderness, deformity or signs of injury. Normal range of motion.      Right shoulder: Normal. Normal range of motion.      Left shoulder: Normal. Normal range of motion.      Right upper arm: Normal. No edema.      Left upper arm: Normal. No edema.      Right elbow: Normal. Normal range of motion.      Left elbow: Normal. Normal range of motion.      Right forearm: Normal. No edema.      Left forearm: Normal. No edema.      Right wrist: Normal. Normal range of motion.      Left wrist: Normal. Normal range of motion.      Right hand: Normal. Normal range of motion.      Left hand: Normal. Normal range of motion.      Cervical back: Full passive range of motion without pain, normal range of motion and neck supple. No edema, erythema, rigidity or tenderness. Normal range of motion.      Right lower leg: No edema.      Left lower leg: No edema.   Lymphadenopathy:      Head:      Right side of head: No submental, submandibular, preauricular, posterior auricular or occipital adenopathy.      Left side of head: No submental, submandibular, preauricular, posterior auricular or occipital adenopathy.      Cervical: No cervical adenopathy.      Right cervical: No superficial, deep or posterior cervical adenopathy.     Left cervical: No superficial, deep or posterior cervical adenopathy.      Upper Body:      Right upper body: No supraclavicular adenopathy.      Left upper body: No supraclavicular adenopathy.   Skin:     General: Skin is warm and dry.      Capillary Refill: Capillary refill takes less than 2 seconds.      Coloration: Skin is not jaundiced.       Findings: No bruising, erythema or rash.   Neurological:      General: No focal deficit present.      Mental Status: She is alert and oriented to person, place, and time.      Cranial Nerves: No dysarthria or facial asymmetry.      Sensory: No sensory deficit.      Motor: No weakness or pronator drift.      Coordination: Romberg sign negative. Coordination normal. Finger-Nose-Finger Test normal.      Gait: Gait and tandem walk normal.      Deep Tendon Reflexes: Reflexes normal.      Reflex Scores:       Tricep reflexes are 2+ on the right side and 2+ on the left side.       Bicep reflexes are 2+ on the right side and 2+ on the left side.       Brachioradialis reflexes are 2+ on the right side and 2+ on the left side.       Patellar reflexes are 1+ on the right side and 1+ on the left side.       Achilles reflexes are 2+ on the right side and 2+ on the left side.  Psychiatric:         Attention and Perception: Attention normal.         Mood and Affect: Mood and affect normal. Mood is not anxious.         Speech: Speech normal.         Behavior: Behavior normal. Behavior is cooperative.         Thought Content: Thought content normal.         Cognition and Memory: Cognition normal. Cognition is not impaired.         Judgment: Judgment normal.                         Assessment and Plan      Diagnoses and all orders for this visit:    1. Annual physical exam (Primary)  Comments:  BP elevated, otherwise normal exam  Health maintenance reviewed  Labs ordered  Follow up in 3 months  Orders:  -     Comprehensive metabolic panel  -     Lipid panel  -     CBC and Differential  -     TSH  -     Hemoglobin A1c    2. Uncontrolled hypertension  Comments:  Elevated today, noncompliant, asymptomatic  Cardiology is managing BP  DASH diet, decrease caffeine/sodium, weight loss  Will follow-up with Cardiology    3. Prediabetes  Comments:  Work on low-carb/no concentrated sweets diet, exercise, weight loss  Hemoglobin A1c  ordered  Follow-up in 3 months  Orders:  -     Comprehensive metabolic panel  -     Hemoglobin A1c    4. Screen for colon cancer  Comments:  Referral for screening colonoscopy ordered  Orders:  -     Ambulatory Referral For Screening Colonoscopy            Follow Up     Return in about 3 months (around 2/9/2023) for Next scheduled follow up.    Patient was given instructions and counseling regarding her condition or for health maintenance advice. Please see specific information pulled into the AVS if appropriate.     Preventative counseling provided during visit regarding healthy diet, daily exercise, weight loss, and the following:  Low glycemic index diet  Exercise 30 minutes most days of the week  Make sure you get results on any labs or tests we ordered today  We discussed medications and how to take them as prescribed  Sleep 6-8 hours each night if possible  If you have not signed up for [employer], please activate your code ASAP from your After Visit Summary today    LDL goal <100  HDL goal >60  Triglyceride goal <150  BP goal =<130/80  Fasting glucose <100    -The patient will follow up with Cardiology regarding elevated blood pressure.  I stressed the importance of taking her blood pressure medication every day and not missing any doses in consideration of her history.  -Follow up in 3 months.

## 2022-11-10 ENCOUNTER — TELEPHONE (OUTPATIENT)
Dept: CARDIOLOGY | Facility: CLINIC | Age: 62
End: 2022-11-10

## 2022-11-10 NOTE — TELEPHONE ENCOUNTER
11/10/22   FAXED PA REQUEST FOR EDARBYCLOR 40-12.5 MG.   PA COMPLETED AND APPROVED from 10/11/22 to 12/31/2099.   ( See papers below)   Gerson RENDON

## 2023-02-13 ENCOUNTER — OFFICE VISIT (OUTPATIENT)
Dept: CARDIOLOGY | Facility: CLINIC | Age: 63
End: 2023-02-13
Payer: OTHER GOVERNMENT

## 2023-02-13 VITALS
WEIGHT: 223 LBS | BODY MASS INDEX: 37.15 KG/M2 | DIASTOLIC BLOOD PRESSURE: 84 MMHG | SYSTOLIC BLOOD PRESSURE: 128 MMHG | HEART RATE: 69 BPM | HEIGHT: 65 IN | OXYGEN SATURATION: 100 %

## 2023-02-13 DIAGNOSIS — I25.10 CORONARY ARTERY DISEASE INVOLVING NATIVE CORONARY ARTERY OF NATIVE HEART WITHOUT ANGINA PECTORIS: Primary | ICD-10-CM

## 2023-02-13 DIAGNOSIS — I10 PRIMARY HYPERTENSION: ICD-10-CM

## 2023-02-13 PROCEDURE — 99213 OFFICE O/P EST LOW 20 MIN: CPT | Performed by: INTERNAL MEDICINE

## 2023-02-13 RX ORDER — CARVEDILOL 25 MG/1
25 TABLET ORAL 2 TIMES DAILY
Qty: 60 TABLET | Refills: 11 | Status: SHIPPED | OUTPATIENT
Start: 2023-02-13 | End: 2023-03-06 | Stop reason: SDUPTHER

## 2023-02-13 RX ORDER — HYDRALAZINE HYDROCHLORIDE 50 MG/1
75 TABLET, FILM COATED ORAL 3 TIMES DAILY
Qty: 405 TABLET | Refills: 3 | Status: SHIPPED | OUTPATIENT
Start: 2023-02-13

## 2023-02-13 RX ORDER — AMLODIPINE BESYLATE 10 MG/1
10 TABLET ORAL DAILY
Qty: 30 TABLET | Refills: 11 | Status: SHIPPED | OUTPATIENT
Start: 2023-02-13 | End: 2023-03-06 | Stop reason: SDUPTHER

## 2023-02-13 NOTE — PROGRESS NOTES
"      CARDIOLOGY    Jorge Alberto Rodriguez MD    ENCOUNTER DATE:  02/13/2023    Henny James / 62 y.o. / female        CHIEF COMPLAINT / REASON FOR OFFICE VISIT     Coronary Artery Disease (11/09/2022 Follow up)  Hypertension    HISTORY OF PRESENT ILLNESS       HPI  Henny James is a 62 y.o. female who presents today for reevaluation.  She is actually doing great says she feels good.  She does occasionally have some palpitations but they last less than a second or 2.  Blood pressure has been doing excellent.      The following portions of the patient's history were reviewed and updated as appropriate: allergies, current medications, past family history, past medical history, past social history, past surgical history and problem list.      VITAL SIGNS     Visit Vitals  /84 (BP Location: Left arm)   Pulse 69   Ht 165.1 cm (65\")   Wt 101 kg (223 lb)   SpO2 100%   BMI 37.11 kg/m²         Wt Readings from Last 3 Encounters:   02/13/23 101 kg (223 lb)   11/09/22 100 kg (221 lb)   11/09/22 9.988 kg (22 lb 0.3 oz)     Body mass index is 37.11 kg/m².      REVIEW OF SYSTEMS   ROS        PHYSICAL EXAMINATION     Vitals reviewed.   Constitutional:       Appearance: Healthy appearance.   Pulmonary:      Effort: Pulmonary effort is normal.   Cardiovascular:      Normal rate. Regular rhythm. Normal S1. Normal S2.      Murmurs: There is no murmur.      No gallop. No click. No rub.   Pulses:     Intact distal pulses.   Edema:     Peripheral edema absent.   Neurological:      Mental Status: Alert and oriented to person, place and time.           REVIEWED DATA     Procedures    Cardiac Procedures:  1.           ASSESSMENT & PLAN      Diagnosis Plan   1. Coronary artery disease involving native coronary artery of native heart without angina pectoris        2. Primary hypertension              SUMMARY/DISCUSSION  1. Coronary artery disease stable  2. Hypertension blood pressures good  3. Continue the same follow-up in 1 year " sooner if issues occur.        MEDICATIONS         Discharge Medications          Accurate as of February 13, 2023  2:20 PM. If you have any questions, ask your nurse or doctor.            Continue These Medications      Instructions Start Date   ALPRAZolam 1 MG tablet  Commonly known as: XANAX   1 mg, Oral, 3 Times Daily PRN      amLODIPine 10 MG tablet  Commonly known as: NORVASC   10 mg, Oral, Daily      ARIPiprazole 10 MG tablet  Commonly known as: ABILIFY   10 mg, Oral, Nightly      aspirin 81 MG chewable tablet   81 mg, Oral, Daily      Azilsartan-Chlorthalidone 40-12.5 MG tablet   1 tablet, Oral, 2 Times Daily      B-12 PO   Oral      carvedilol 25 MG tablet  Commonly known as: COREG   25 mg, Oral, 2 Times Daily      clotrimazole-betamethasone 1-0.05 % cream  Commonly known as: LOTRISONE   No dose, route, or frequency recorded.      hydrALAZINE 50 MG tablet  Commonly known as: APRESOLINE   75 mg, Oral, 3 Times Daily      lamoTRIgine 200 MG tablet  Commonly known as: LaMICtal   200 mg, Oral, Nightly      traZODone 50 MG tablet  Commonly known as: DESYREL   No dose, route, or frequency recorded.                 **Dragon Disclaimer:   Much of this encounter note is an electronic transcription/translation of spoken language to printed text. The electronic translation of spoken language may permit erroneous, or at times, nonsensical words or phrases to be inadvertently transcribed. Although I have reviewed the note for such errors, some may still exist.

## 2023-03-06 RX ORDER — AMLODIPINE BESYLATE 10 MG/1
10 TABLET ORAL DAILY
Qty: 30 TABLET | Refills: 11 | Status: SHIPPED | OUTPATIENT
Start: 2023-03-06

## 2023-03-06 RX ORDER — CARVEDILOL 25 MG/1
25 TABLET ORAL 2 TIMES DAILY
Qty: 60 TABLET | Refills: 11 | Status: SHIPPED | OUTPATIENT
Start: 2023-03-06

## 2023-05-09 ENCOUNTER — OFFICE VISIT (OUTPATIENT)
Dept: FAMILY MEDICINE CLINIC | Facility: CLINIC | Age: 63
End: 2023-05-09
Payer: OTHER GOVERNMENT

## 2023-05-09 VITALS
DIASTOLIC BLOOD PRESSURE: 82 MMHG | BODY MASS INDEX: 36.65 KG/M2 | SYSTOLIC BLOOD PRESSURE: 110 MMHG | TEMPERATURE: 98.9 F | WEIGHT: 220 LBS | RESPIRATION RATE: 16 BRPM | HEIGHT: 65 IN

## 2023-05-09 DIAGNOSIS — G93.32 POST-COVID CHRONIC FATIGUE: Primary | ICD-10-CM

## 2023-05-09 DIAGNOSIS — U09.9 POST-COVID CHRONIC FATIGUE: Primary | ICD-10-CM

## 2023-05-09 DIAGNOSIS — R73.03 PREDIABETES: ICD-10-CM

## 2023-05-09 DIAGNOSIS — R41.3 MEMORY LOSS, SHORT TERM: ICD-10-CM

## 2023-05-09 PROBLEM — M54.12 BRACHIAL NEURITIS OR RADICULITIS: Status: ACTIVE | Noted: 2017-01-27

## 2023-05-09 PROBLEM — M76.61 ACHILLES TENDINITIS OF RIGHT LOWER EXTREMITY: Status: ACTIVE | Noted: 2021-12-06

## 2023-05-09 PROBLEM — I16.0 HYPERTENSIVE URGENCY: Status: ACTIVE | Noted: 2018-04-17

## 2023-05-09 PROBLEM — M25.551 PAIN IN RIGHT HIP: Status: ACTIVE | Noted: 2021-12-06

## 2023-05-09 PROBLEM — M17.9 OSTEOARTHRITIS OF KNEE: Status: ACTIVE | Noted: 2021-11-29

## 2023-05-09 PROBLEM — M25.519 PAIN IN JOINT INVOLVING SHOULDER REGION: Status: ACTIVE | Noted: 2018-03-19

## 2023-05-09 PROBLEM — M75.100 ROTATOR CUFF SYNDROME: Status: ACTIVE | Noted: 2017-01-27

## 2023-05-09 PROBLEM — E78.00 HYPERCHOLESTEREMIA: Status: ACTIVE | Noted: 2023-03-02

## 2023-05-09 PROBLEM — R29.90 STROKE-LIKE SYMPTOM: Status: ACTIVE | Noted: 2018-04-17

## 2023-05-09 NOTE — PROGRESS NOTES
"Chief Complaint  Follow-up, Fatigue (Pt states she is a long hauler for covid. Since covid pt states she has had memory loss, fatigue shortness of breath.), and Memory Loss    Subjective          History of Present Illness    Henny PEREZ Erika 62 y.o. female presents today reporting chronic fatigue and memory loss.  Symptoms started following Covid in 2020.  She reports short-term memory loss and having a delayed response.  She has trouble recalling things.  Her children remind her of things often that she has forgotten, then she will remember.  Her step-father has Alzheimers and dementia.  No biological family history.  She does have a history of TIA and CVA.  She denies speech difficulty, weakness, facial asymmetry, tremors, and numbness.      Review of Systems   Constitutional: Positive for fatigue. Negative for chills and fever.   HENT: Negative for congestion and sinus pressure.    Eyes: Negative for visual disturbance.   Respiratory: Negative for cough, chest tightness, shortness of breath and wheezing.    Cardiovascular: Negative for chest pain, palpitations and leg swelling.   Gastrointestinal: Negative for abdominal pain, constipation, diarrhea, nausea and vomiting.   Genitourinary: Negative for dysuria, frequency and urgency.   Musculoskeletal: Negative for back pain.   Skin: Negative for rash.   Neurological: Negative for dizziness, syncope, speech difficulty and light-headedness.        Memory loss   Psychiatric/Behavioral: Negative for behavioral problems and sleep disturbance.        Objective   Vital Signs:   /82 (BP Location: Left arm, Patient Position: Sitting, Cuff Size: Adult)   Temp 98.9 °F (37.2 °C) (Oral)   Resp 16   Ht 165.1 cm (65\")   Wt 99.8 kg (220 lb)   BMI 36.61 kg/m²      Class 2 Severe Obesity (BMI >=35 and <=39.9). Obesity-related health conditions include the following: hypertension, diabetes mellitus and osteoarthritis. Obesity is newly identified. BMI is is above average; BMI " management plan is completed. We discussed portion control and increasing exercise.        Physical Exam  Vitals and nursing note reviewed.   Constitutional:       General: She is not in acute distress.     Appearance: Normal appearance. She is well-developed. She is obese. She is not ill-appearing, toxic-appearing or diaphoretic.   HENT:      Head: Normocephalic.      Right Ear: External ear normal.      Left Ear: External ear normal.   Eyes:      General: Vision grossly intact. No scleral icterus.     Extraocular Movements: Extraocular movements intact.      Right eye: Normal extraocular motion and no nystagmus.      Left eye: Normal extraocular motion and no nystagmus.      Pupils: Pupils are equal, round, and reactive to light.   Neck:      Thyroid: No thyromegaly.      Vascular: No carotid bruit.   Cardiovascular:      Rate and Rhythm: Normal rate and regular rhythm.      Pulses: Normal pulses.      Heart sounds: Normal heart sounds.   Pulmonary:      Effort: Pulmonary effort is normal. No respiratory distress.      Breath sounds: Normal breath sounds. No stridor.   Musculoskeletal:         General: No swelling or deformity.      Cervical back: Normal range of motion and neck supple.      Right lower leg: No edema.      Left lower leg: No edema.   Skin:     General: Skin is warm.      Coloration: Skin is not jaundiced.   Neurological:      General: No focal deficit present.      Mental Status: She is alert and oriented to person, place, and time.      Cranial Nerves: No dysarthria or facial asymmetry.      Sensory: Sensation is intact. No sensory deficit.      Motor: Motor function is intact. No weakness, tremor, atrophy, abnormal muscle tone, seizure activity or pronator drift.      Coordination: Coordination is intact. Coordination normal.      Gait: Gait is intact. Gait normal.      Deep Tendon Reflexes: Reflexes are normal and symmetric. Reflexes normal.      Reflex Scores:       Bicep reflexes are 2+ on  the right side and 2+ on the left side.       Brachioradialis reflexes are 2+ on the right side and 2+ on the left side.       Patellar reflexes are 2+ on the right side and 2+ on the left side.     Comments: Normal neurological exam.   Psychiatric:         Mood and Affect: Mood normal.         Behavior: Behavior normal.         Thought Content: Thought content normal.         Judgment: Judgment normal.                         Assessment and Plan      Diagnoses and all orders for this visit:    1. Post-COVID chronic fatigue (Primary)  -     Comprehensive metabolic panel  -     Lipid panel  -     CBC and Differential  -     TSH  -     Vitamin B12  -     Folate  -     Vitamin D,25-Hydroxy  -     Iron level  -     Ferritin  -     Hemoglobin A1c    2. Memory loss, short term  -     Comprehensive metabolic panel  -     Lipid panel  -     CBC and Differential  -     TSH  -     Vitamin B12  -     Folate  -     Vitamin D,25-Hydroxy  -     Iron level  -     Ferritin  -     Hemoglobin A1c  -     Ambulatory Referral to Neurology    3. Prediabetes  -     Comprehensive metabolic panel  -     Lipid panel  -     CBC and Differential  -     TSH  -     Vitamin B12  -     Folate  -     Vitamin D,25-Hydroxy  -     Iron level  -     Ferritin  -     Hemoglobin A1c            Follow Up     Return if symptoms worsen or fail to improve.    Patient was given instructions and counseling regarding her condition or for health maintenance advice. Please see specific information pulled into the AVS if appropriate.     -Return if symptoms worsen or do not improve.

## 2023-05-10 DIAGNOSIS — R79.89 ELEVATED PLATELET COUNT: Primary | ICD-10-CM

## 2023-05-10 DIAGNOSIS — R79.89 ELEVATED FERRITIN: ICD-10-CM

## 2023-05-10 LAB
25(OH)D3+25(OH)D2 SERPL-MCNC: 12.6 NG/ML (ref 30–100)
ALBUMIN SERPL-MCNC: 4.4 G/DL (ref 3.5–5.2)
ALBUMIN/GLOB SERPL: 1.3 G/DL
ALP SERPL-CCNC: 117 U/L (ref 39–117)
ALT SERPL-CCNC: 21 U/L (ref 1–33)
AST SERPL-CCNC: 20 U/L (ref 1–32)
BASOPHILS # BLD AUTO: 0.09 10*3/MM3 (ref 0–0.2)
BASOPHILS NFR BLD AUTO: 1.4 % (ref 0–1.5)
BILIRUB SERPL-MCNC: <0.2 MG/DL (ref 0–1.2)
BUN SERPL-MCNC: 11 MG/DL (ref 8–23)
BUN/CREAT SERPL: 16.9 (ref 7–25)
CALCIUM SERPL-MCNC: 9.9 MG/DL (ref 8.6–10.5)
CHLORIDE SERPL-SCNC: 101 MMOL/L (ref 98–107)
CHOLEST SERPL-MCNC: 222 MG/DL (ref 0–200)
CO2 SERPL-SCNC: 24.7 MMOL/L (ref 22–29)
CREAT SERPL-MCNC: 0.65 MG/DL (ref 0.57–1)
EGFRCR SERPLBLD CKD-EPI 2021: 100.9 ML/MIN/1.73
EOSINOPHIL # BLD AUTO: 0.04 10*3/MM3 (ref 0–0.4)
EOSINOPHIL NFR BLD AUTO: 0.6 % (ref 0.3–6.2)
ERYTHROCYTE [DISTWIDTH] IN BLOOD BY AUTOMATED COUNT: 15 % (ref 12.3–15.4)
FERRITIN SERPL-MCNC: 174 NG/ML (ref 13–150)
FOLATE SERPL-MCNC: 16.5 NG/ML (ref 4.78–24.2)
GLOBULIN SER CALC-MCNC: 3.4 GM/DL
GLUCOSE SERPL-MCNC: 79 MG/DL (ref 65–99)
HBA1C MFR BLD: 5.9 % (ref 4.8–5.6)
HCT VFR BLD AUTO: 37.8 % (ref 34–46.6)
HDLC SERPL-MCNC: 65 MG/DL (ref 40–60)
HGB BLD-MCNC: 12.2 G/DL (ref 12–15.9)
IMM GRANULOCYTES # BLD AUTO: 0.01 10*3/MM3 (ref 0–0.05)
IMM GRANULOCYTES NFR BLD AUTO: 0.2 % (ref 0–0.5)
IRON SERPL-MCNC: 50 MCG/DL (ref 37–145)
LDLC SERPL CALC-MCNC: 140 MG/DL (ref 0–100)
LYMPHOCYTES # BLD AUTO: 2.93 10*3/MM3 (ref 0.7–3.1)
LYMPHOCYTES NFR BLD AUTO: 45.9 % (ref 19.6–45.3)
MCH RBC QN AUTO: 28 PG (ref 26.6–33)
MCHC RBC AUTO-ENTMCNC: 32.3 G/DL (ref 31.5–35.7)
MCV RBC AUTO: 86.7 FL (ref 79–97)
MONOCYTES # BLD AUTO: 0.59 10*3/MM3 (ref 0.1–0.9)
MONOCYTES NFR BLD AUTO: 9.2 % (ref 5–12)
NEUTROPHILS # BLD AUTO: 2.72 10*3/MM3 (ref 1.7–7)
NEUTROPHILS NFR BLD AUTO: 42.7 % (ref 42.7–76)
NRBC BLD AUTO-RTO: 0 /100 WBC (ref 0–0.2)
PLATELET # BLD AUTO: 565 10*3/MM3 (ref 140–450)
POTASSIUM SERPL-SCNC: 4.1 MMOL/L (ref 3.5–5.2)
PROT SERPL-MCNC: 7.8 G/DL (ref 6–8.5)
RBC # BLD AUTO: 4.36 10*6/MM3 (ref 3.77–5.28)
SODIUM SERPL-SCNC: 140 MMOL/L (ref 136–145)
TRIGL SERPL-MCNC: 99 MG/DL (ref 0–150)
TSH SERPL DL<=0.005 MIU/L-ACNC: 1.76 UIU/ML (ref 0.27–4.2)
VIT B12 SERPL-MCNC: >2000 PG/ML (ref 211–946)
VLDLC SERPL CALC-MCNC: 17 MG/DL (ref 5–40)
WBC # BLD AUTO: 6.38 10*3/MM3 (ref 3.4–10.8)

## 2023-10-02 RX ORDER — HYDRALAZINE HYDROCHLORIDE 50 MG/1
75 TABLET, FILM COATED ORAL 3 TIMES DAILY
Qty: 405 TABLET | Refills: 0 | Status: SHIPPED | OUTPATIENT
Start: 2023-10-02 | End: 2023-10-05 | Stop reason: SDUPTHER

## 2023-10-05 RX ORDER — HYDRALAZINE HYDROCHLORIDE 50 MG/1
75 TABLET, FILM COATED ORAL 3 TIMES DAILY
Qty: 405 TABLET | Refills: 3 | Status: SHIPPED | OUTPATIENT
Start: 2023-10-05

## 2023-10-10 ENCOUNTER — TELEPHONE (OUTPATIENT)
Dept: CARDIOLOGY | Facility: CLINIC | Age: 63
End: 2023-10-10
Payer: OTHER GOVERNMENT

## 2023-10-10 RX ORDER — HYDRALAZINE HYDROCHLORIDE 50 MG/1
75 TABLET, FILM COATED ORAL 3 TIMES DAILY
Qty: 405 TABLET | Refills: 3 | Status: SHIPPED | OUTPATIENT
Start: 2023-10-10

## 2023-10-10 NOTE — TELEPHONE ENCOUNTER
Caller: Henny James    Relationship: Self    Best call back number: [phone]    What is the best time to reach you: ANY     Who are you requesting to speak with (clinical staff, provider,  specific staff member): CLINICAL         What was the call regarding: PATIENT HAS RECEIVED MESSAGED FROM [facility] THAT HER PRESCRIPTION IS READY FOR . PATIENT USES [facility].PLEASE SEND ALL MEDICATION THROUGH [facility].   Is it okay if the provider responds through [employer] ([email or web address])hart: CALL IF NEEDED

## 2023-11-03 ENCOUNTER — TELEPHONE (OUTPATIENT)
Dept: FAMILY MEDICINE CLINIC | Facility: CLINIC | Age: 63
End: 2023-11-03
Payer: OTHER GOVERNMENT

## 2023-11-08 ENCOUNTER — OFFICE VISIT (OUTPATIENT)
Dept: FAMILY MEDICINE CLINIC | Facility: CLINIC | Age: 63
End: 2023-11-08
Payer: OTHER GOVERNMENT

## 2023-11-08 VITALS
TEMPERATURE: 98.9 F | HEART RATE: 85 BPM | HEIGHT: 65 IN | SYSTOLIC BLOOD PRESSURE: 121 MMHG | DIASTOLIC BLOOD PRESSURE: 60 MMHG | RESPIRATION RATE: 16 BRPM | WEIGHT: 229 LBS | BODY MASS INDEX: 38.15 KG/M2 | OXYGEN SATURATION: 97 %

## 2023-11-08 DIAGNOSIS — G62.9 PERIPHERAL POLYNEUROPATHY: Primary | ICD-10-CM

## 2023-11-08 DIAGNOSIS — R73.03 PREDIABETES: ICD-10-CM

## 2023-11-08 DIAGNOSIS — R09.89 DECREASED PULSES IN FEET: ICD-10-CM

## 2023-11-08 PROCEDURE — 99213 OFFICE O/P EST LOW 20 MIN: CPT

## 2023-11-08 NOTE — Clinical Note
November 8, 2023     Patient: Henny James   YOB: 1960   Date of Visit: 11/8/2023       To Whom it May Concern:    Henny James was seen in my clinic on 11/8/2023. She  may return to school in one day.           Sincerely,          ANASTASIIA Cárdenas        CC: No Recipients

## 2023-11-08 NOTE — PROGRESS NOTES
Chief Complaint  Numbness    Subjective         History of Present Illness    The patient complains of bilateral foot and toe numbness. The symptoms began several years ago.  Pain is a result of no known event. Pain is located at the foot region. Discomfort is described as burning, sharp, and tingling. Symptoms are exacerbated by nothing.  Evaluation to date: none. Therapy to date includes: rest and OTC NSAIDs.     Ms. James presents today with longstanding bilateral foot pain. She reports decreased or absent sensation to several areas to her feet. No abnormal gait.    She reports trying heat, ice, massage, and NSAID use to alleviate the discomfort, none have been helpful at correcting the issue.     Ms. James reports daily marijuana use, and diet is high in carbohydrates, sugary snacks and candy's. Last hemoglobin A1c was 5.9% on 5/9/2023.  Will recheck A1c today.    She is an established patient with [facility].       Review of Systems   Constitutional:  Negative for chills and fatigue.   HENT: Negative.     Eyes: Negative.  Negative for visual disturbance.   Respiratory: Negative.  Negative for cough, chest tightness and shortness of breath.    Cardiovascular: Negative.  Negative for chest pain, palpitations and leg swelling.   Gastrointestinal: Negative.    Endocrine: Negative.  Negative for cold intolerance and heat intolerance.   Genitourinary: Negative.  Negative for dysuria and hematuria.   Musculoskeletal:  Positive for arthralgias (toe pain). Negative for back pain, gait problem, joint swelling, myalgias, neck pain and neck stiffness.   Skin: Negative.    Allergic/Immunologic: Negative.    Neurological:  Positive for numbness. Negative for syncope and weakness.   Hematological: Negative.    Psychiatric/Behavioral: Negative.          Objective   Vital Signs:   /60 (BP Location: Left arm, Patient Position: Sitting, Cuff Size: Adult)   Pulse 85   Temp 98.9 °F (37.2 °C) (Oral)   Resp 16   " Ht 165.1 cm (65\")   Wt 104 kg (229 lb)   SpO2 97%   BMI 38.11 kg/m²          Physical Exam  Vitals and nursing note reviewed.   Constitutional:       General: She is not in acute distress.     Appearance: She is well-developed. She is not diaphoretic.   HENT:      Head: Normocephalic and atraumatic.   Eyes:      General: No scleral icterus.     Conjunctiva/sclera: Conjunctivae normal.      Pupils: Pupils are equal, round, and reactive to light.   Cardiovascular:      Rate and Rhythm: Normal rate and regular rhythm.      Pulses:           Dorsalis pedis pulses are 0 on the right side and 0 on the left side.        Posterior tibial pulses are 0 on the right side and 0 on the left side.      Heart sounds: Normal heart sounds. No murmur heard.     No gallop.   Pulmonary:      Effort: Pulmonary effort is normal. No respiratory distress.      Breath sounds: Normal breath sounds. No wheezing or rales.   Chest:      Chest wall: No tenderness.   Abdominal:      Palpations: Abdomen is soft.      Tenderness: There is no abdominal tenderness.   Musculoskeletal:         General: Tenderness present. No deformity.      Cervical back: Normal range of motion and neck supple.      Right lower leg: No edema.      Left lower leg: No edema.      Right foot: Normal range of motion. No deformity.      Left foot: Normal range of motion. No deformity.        Feet:    Feet:      Right foot:      Protective Sensation: 7 sites tested.  7 sites sensed.      Skin integrity: Warmth present. No ulcer, blister or skin breakdown.      Toenail Condition: Right toenails are abnormally thick.      Left foot:      Protective Sensation: 7 sites tested.  7 sites sensed.      Skin integrity: Warmth present. No ulcer, blister or skin breakdown.      Toenail Condition: Left toenails are abnormally thick.   Skin:     General: Skin is warm and dry.      Findings: No rash.   Neurological:      Mental Status: She is alert and oriented to person, place, and " time.      Sensory: Sensory deficit present.      Motor: No tremor, atrophy or abnormal muscle tone.      Coordination: Coordination is intact. Coordination normal.      Gait: Gait normal.      Deep Tendon Reflexes: Reflexes are normal and symmetric. Reflexes normal.      Reflex Scores:       Achilles reflexes are 2+ on the right side and 2+ on the left side.  Psychiatric:         Behavior: Behavior normal.         Thought Content: Thought content normal.         Judgment: Judgment normal.          The following data was reviewed by: ANASTASIIA Cárdenas on 11/08/2023:  Hemoglobin A1c (05/09/2023 15:27)              Assessment and Plan      Diagnoses and all orders for this visit:    1. Peripheral polyneuropathy (Primary)  Comments:  Schedule appt with established Podiatrist   Discussed low carb low sugar diet to improve vascularization  A1c today  Orders:  -     Hemoglobin A1c  -     Doppler Arterial Lower Extremity Stress CAR    2. Prediabetes  Comments:  Last A1c 5.9%  Low carb/no sweets diet, exercise, weight loss  A1C today  Orders:  -     Hemoglobin A1c  -     Doppler Arterial Lower Extremity Stress CAR    3. Decreased pulses in feet  Comments:  Schedule appt with established Podiatrist   Low carb low sugar diet to improve vascularization  LE Doppler ordered  A1c today  Schedule appt with est Podiatrist  Orders:  -     Doppler Arterial Lower Extremity Stress CAR            Follow Up     Return if symptoms worsen or fail to improve.    Patient was given instructions and counseling regarding her condition or for health maintenance advice. Please see specific information pulled into the AVS if appropriate.

## 2023-11-09 LAB — HBA1C MFR BLD: 6 % (ref 4.8–5.6)

## 2023-11-13 ENCOUNTER — TELEPHONE (OUTPATIENT)
Dept: FAMILY MEDICINE CLINIC | Facility: CLINIC | Age: 63
End: 2023-11-13
Payer: OTHER GOVERNMENT

## 2023-11-13 NOTE — TELEPHONE ENCOUNTER
A1c went up a bit to 6.0% and is still in prediabetes range.  Work on low carb and no sweets diet with exercise.  Please let her know I ordered a doppler at her appointment for decreased lower extremity pulses.          Called pt could not reach okay for hub to relay message

## 2023-11-21 ENCOUNTER — HOSPITAL ENCOUNTER (OUTPATIENT)
Dept: CARDIOLOGY | Facility: HOSPITAL | Age: 63
Discharge: HOME | End: 2023-11-21
Payer: OTHER GOVERNMENT

## 2023-11-21 PROCEDURE — 93922 UPR/L XTREMITY ART 2 LEVELS: CPT

## 2023-11-22 LAB
BH CV LOWER ARTERIAL LEFT ABI RATIO: NORMAL
BH CV LOWER ARTERIAL LEFT DORSALIS PEDIS SYS MAX: 226
BH CV LOWER ARTERIAL LEFT GREAT TOE SYS MAX: 166
BH CV LOWER ARTERIAL LEFT POST TIBIAL SYS MAX: NORMAL
BH CV LOWER ARTERIAL LEFT TBI RATIO: 0.91
BH CV LOWER ARTERIAL RIGHT ABI RATIO: 1.3
BH CV LOWER ARTERIAL RIGHT DORSALIS PEDIS SYS MAX: 214
BH CV LOWER ARTERIAL RIGHT GREAT TOE SYS MAX: 172
BH CV LOWER ARTERIAL RIGHT POST TIBIAL SYS MAX: 236
BH CV LOWER ARTERIAL RIGHT TBI RATIO: 0.96
UPPER ARTERIAL LEFT ARM BRACHIAL SYS MAX: 182
UPPER ARTERIAL RIGHT ARM BRACHIAL SYS MAX: 172

## 2023-11-27 ENCOUNTER — TELEPHONE (OUTPATIENT)
Dept: FAMILY MEDICINE CLINIC | Facility: CLINIC | Age: 63
End: 2023-11-27
Payer: OTHER GOVERNMENT

## 2023-12-11 ENCOUNTER — TELEPHONE (OUTPATIENT)
Dept: FAMILY MEDICINE CLINIC | Facility: CLINIC | Age: 63
End: 2023-12-11

## 2023-12-11 NOTE — TELEPHONE ENCOUNTER
Caller: Henny James    Relationship: Self    Best call back number: [phone]    What is the best time to reach you: ASAP    Who are you requesting to speak with (clinical staff, provider, specific staff member): CLINICAL     What was the call regarding: PT IS HAVING A LOT OF STOMACH ISSUES AND LOSS OF APPETITE. WANTS TO KNOW IF ANY OF HER MEDICATIONS COULD BE THE CAUSE OF HER SYMPTOMS AND UNEXPLAINED WEIGHT LOSS. PLEASE ADVISE.     Is it okay if the provider responds through MyChart: NO

## 2023-12-12 ENCOUNTER — OFFICE VISIT (OUTPATIENT)
Dept: FAMILY MEDICINE CLINIC | Facility: CLINIC | Age: 63
End: 2023-12-12
Payer: OTHER GOVERNMENT

## 2023-12-12 VITALS
BODY MASS INDEX: 36.65 KG/M2 | RESPIRATION RATE: 16 BRPM | SYSTOLIC BLOOD PRESSURE: 146 MMHG | OXYGEN SATURATION: 100 % | TEMPERATURE: 97.5 F | WEIGHT: 220 LBS | DIASTOLIC BLOOD PRESSURE: 84 MMHG | HEART RATE: 65 BPM | HEIGHT: 65 IN

## 2023-12-12 DIAGNOSIS — R73.03 PREDIABETES: ICD-10-CM

## 2023-12-12 DIAGNOSIS — Z90.81 S/P SPLENECTOMY: ICD-10-CM

## 2023-12-12 DIAGNOSIS — E55.9 VITAMIN D DEFICIENCY: ICD-10-CM

## 2023-12-12 DIAGNOSIS — I10 PRIMARY HYPERTENSION: ICD-10-CM

## 2023-12-12 DIAGNOSIS — Z85.09 HISTORY OF GASTROINTESTINAL STROMAL TUMOR (GIST): Primary | ICD-10-CM

## 2023-12-12 DIAGNOSIS — R19.7 DIARRHEA, UNSPECIFIED TYPE: ICD-10-CM

## 2023-12-12 DIAGNOSIS — R63.4 ABNORMAL WEIGHT LOSS: ICD-10-CM

## 2023-12-12 DIAGNOSIS — R63.4 WEIGHT LOSS: ICD-10-CM

## 2023-12-12 NOTE — Clinical Note
New patient to me today 63-year-old with history of GIST tumor resection and Gleevac in 2011 and 2012--- now having upper abdominal cramping, diarrhea and change in stools, weight loss of 10 pounds in 1 month without trying no appetite, no recent colonoscopy... I am ordering labs and a CT of abdomen and pelvis with oral and IV contrast and CT chest with contrast  what else can I order?  Should I have her schedule with you or Dr. Briggs?

## 2023-12-12 NOTE — PROGRESS NOTES
Subjective   Henny James is a 63 y.o. female.     Weight Loss  Associated symptoms include fatigue.   Fatigue  Associated symptoms include fatigue.   Diarrhea   Associated symptoms include weight loss.   Dizziness  Associated symptoms include fatigue.      Henny James 63 y.o. female who presents for evaluation of abdominal pain, adominal cramping, and diarrhea . Symptoms have been present for 1 month .  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 This was in 2011 no recent colonoscopy... She is lost 10 pounds without trying she is tired she has no appetite and and her bowel movements can be yellow they can be soft pencil thin or diarrhea---usually twice daily. she is experiencing fatigue, excessive belching, weight loss, and abd cramping. Some h/a.  Alleviating factors are nothing with no change in symptoms . Early satiety.  Patient denies recent travel.    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 Underwent resection and pathology ([id number]) confirmed malignant GIST , measuring 7 cm in greatest dimension. Given the risk for recurrence of her gastrointestinal stromal tumor, it was decided to treat her adjuvantly with imatinib. She discontinued imatinib in September/October 2012 by her own choosing citing the side-effects including distention of the stomach and absence of menses. She followed up until February 2013 and afterward did not  return for follow up. Due to pulmonary embolism in 2011 she was on Coumadin but she discontinued on her own. Instead she started herself on 2 tablets of 81 milligrams aspirin daily regimen. A CT scan, done in February of 2013, showed no findings concerning for recurrent disease   She also was seen for thrombocytopenia    Last saw cardio--Dr Rodriguez 2-13-23 CAD ----HTN----all stable    Neg for sleep apnea    Dr Bowles-----is psych  She needs f/u carotid doppler!!! CTA 2021 neck  There is moderate tortuosity appreciated involving the  mid cervical ICAs bilaterally. There is approximately 30-40% stenosis of  the right internal carotid artery a  The following portions of the patient's history were reviewed and updated as appropriate: allergies, current medications, past family history, past medical history, past social history, past surgical history, and problem list.    Review of Systems   Constitutional:  Positive for activity change, appetite change, fatigue and unexpected weight loss.   Gastrointestinal:  Positive for diarrhea.   Neurological:  Positive for dizziness and headache.       Objective   Physical Exam  Vitals and nursing note reviewed.   Constitutional:       General: She is not in acute distress.     Appearance: She is well-developed. She is not ill-appearing or toxic-appearing.   HENT:      Head: Normocephalic.      Right Ear: External ear normal.      Left Ear: External ear normal.      Nose: Nose normal.      Mouth/Throat:      Pharynx: Oropharynx is clear.   Eyes:      General: No scleral icterus.     Conjunctiva/sclera: Conjunctivae normal.      Pupils: Pupils are equal, round, and reactive to light.   Neck:      Thyroid: No thyromegaly.   Cardiovascular:      Rate and Rhythm: Normal rate and regular rhythm.      Heart sounds: Normal heart sounds. Murmur heard.   Pulmonary:      Effort: Pulmonary effort is normal. No respiratory distress.      Breath sounds: Normal breath sounds.   Abdominal:       General: Bowel sounds are normal.      Palpations: Abdomen is soft.      Tenderness: There is abdominal tenderness.   Musculoskeletal:         General: No deformity. Normal range of motion.      Cervical back: Normal range of motion and neck supple.   Skin:     General: Skin is warm and dry.      Findings: No rash.   Neurological:      General: No focal deficit present.      Mental Status: She is alert and oriented to person, place, and time. Mental status is at baseline.   Psychiatric:         Behavior: Behavior normal.         Thought Content: Thought content normal.         Judgment: Judgment normal.           Assessment & Plan   Diagnoses and all orders for this visit:    1. History of gastrointestinal stromal tumor (GIST) (Primary)  -     Comprehensive metabolic panel  -     Lipid panel  -     CBC and Differential  -     TSH  -     Hemoglobin A1c  -     T4, Free  -     T3, Free  -     Vitamin D,25-Hydroxy  -     Vitamin B12  -     Folate  -     Urinalysis With Microscopic - Urine, Clean Catch  -     CT Chest With Contrast Diagnostic  -     CT Abdomen Pelvis With Contrast    2. Weight loss  -     Comprehensive metabolic panel  -     Lipid panel  -     CBC and Differential  -     TSH  -     Hemoglobin A1c  -     T4, Free  -     T3, Free  -     Vitamin D,25-Hydroxy  -     Vitamin B12  -     Folate  -     Urinalysis With Microscopic - Urine, Clean Catch  -     CT Chest With Contrast Diagnostic  -     CT Abdomen Pelvis With Contrast    3. Diarrhea, unspecified type  -     Comprehensive metabolic panel  -     Lipid panel  -     CBC and Differential  -     TSH  -     Hemoglobin A1c  -     T4, Free  -     T3, Free  -     Vitamin D,25-Hydroxy  -     Vitamin B12  -     Folate  -     Urinalysis With Microscopic - Urine, Clean Catch  -     CT Chest With Contrast Diagnostic  -     CT Abdomen Pelvis With Contrast    4. Vitamin D deficiency  -     Comprehensive metabolic panel  -     Lipid panel  -     CBC and Differential  -      TSH  -     Hemoglobin A1c  -     T4, Free  -     T3, Free  -     Vitamin D,25-Hydroxy  -     Vitamin B12  -     Folate  -     Urinalysis With Microscopic - Urine, Clean Catch  -     CT Chest With Contrast Diagnostic  -     CT Abdomen Pelvis With Contrast    5. Prediabetes  -     Comprehensive metabolic panel  -     Lipid panel  -     CBC and Differential  -     TSH  -     Hemoglobin A1c  -     T4, Free  -     T3, Free  -     Vitamin D,25-Hydroxy  -     Vitamin B12  -     Folate  -     Urinalysis With Microscopic - Urine, Clean Catch  -     CT Chest With Contrast Diagnostic  -     CT Abdomen Pelvis With Contrast    6. Primary hypertension  -     Comprehensive metabolic panel  -     Lipid panel  -     CBC and Differential  -     TSH  -     Hemoglobin A1c  -     T4, Free  -     T3, Free  -     Vitamin D,25-Hydroxy  -     Vitamin B12  -     Folate  -     Urinalysis With Microscopic - Urine, Clean Catch  -     CT Chest With Contrast Diagnostic  -     CT Abdomen Pelvis With Contrast    7. Abnormal weight loss  -     CT Chest With Contrast Diagnostic  -     CT Abdomen Pelvis With Contrast     patient sees Dr. Bowles at [facility] for psychiatry for bipolar disorder  Sees Dr. Rodriguez for CAD and hypertension  Will contact Dr. Wheeler about my plan today--- I was planning on ordering labs to include a CBC and a CMP check thyroid and vitamins with her weight loss also CT abdomen and pelvis with oral and IV contrast  Want to find out the most appropriate specialist for her to see and how soon I can get this  She needs follow-up for vaccinations for splenectomy and also I will not order carotid studies after looking at 2021 CTA of the neck  We discussed compliance today and patient following through an every order  Also seeing me for prevention and follow-up vaccinations due to splenectomy history

## 2023-12-13 ENCOUNTER — TELEPHONE (OUTPATIENT)
Dept: FAMILY MEDICINE CLINIC | Facility: CLINIC | Age: 63
End: 2023-12-13
Payer: OTHER GOVERNMENT

## 2023-12-13 PROBLEM — Z90.81 S/P SPLENECTOMY: Status: ACTIVE | Noted: 2023-12-13

## 2023-12-13 NOTE — TELEPHONE ENCOUNTER
UNABLE TO WARM TRANSFER      Caller: Henny James    Relationship to patient: Self    Best call back number: [phone]     PATIENT STATES SHE WAS SEEN BY DR MOORE YESTERDAY AND WAS ADVISED TO CALL THIS MORNING TO SEE IF THE ONCOLOGIST THAT SHE WOULD BE REACHING OUT TO WANTED TO ADD MORE LABS     PATIENT STATES SHE WAS ADVISED TO HAVE LABS DONE TODAY

## 2023-12-14 LAB
25(OH)D3+25(OH)D2 SERPL-MCNC: 15.4 NG/ML (ref 30–100)
ALBUMIN SERPL-MCNC: 4.4 G/DL (ref 3.5–5.2)
ALBUMIN/GLOB SERPL: 1.8 G/DL
ALP SERPL-CCNC: 93 U/L (ref 39–117)
ALT SERPL-CCNC: 18 U/L (ref 1–33)
APPEARANCE UR: ABNORMAL
AST SERPL-CCNC: 18 U/L (ref 1–32)
BACTERIA #/AREA URNS HPF: ABNORMAL /HPF
BASOPHILS # BLD AUTO: 0.05 10*3/MM3 (ref 0–0.2)
BASOPHILS NFR BLD AUTO: 0.9 % (ref 0–1.5)
BILIRUB SERPL-MCNC: 0.2 MG/DL (ref 0–1.2)
BILIRUB UR QL STRIP: NEGATIVE
BUN SERPL-MCNC: 13 MG/DL (ref 8–23)
BUN/CREAT SERPL: 17.6 (ref 7–25)
CALCIUM SERPL-MCNC: 9.6 MG/DL (ref 8.6–10.5)
CASTS URNS MICRO: ABNORMAL
CHLORIDE SERPL-SCNC: 103 MMOL/L (ref 98–107)
CHOLEST SERPL-MCNC: 198 MG/DL (ref 0–200)
CO2 SERPL-SCNC: 25.7 MMOL/L (ref 22–29)
COLOR UR: ABNORMAL
CREAT SERPL-MCNC: 0.74 MG/DL (ref 0.57–1)
CRYSTALS URNS MICRO: ABNORMAL
EGFRCR SERPLBLD CKD-EPI 2021: 92.2 ML/MIN/1.73
EOSINOPHIL # BLD AUTO: 0.04 10*3/MM3 (ref 0–0.4)
EOSINOPHIL NFR BLD AUTO: 0.7 % (ref 0.3–6.2)
EPI CELLS #/AREA URNS HPF: ABNORMAL /HPF
ERYTHROCYTE [DISTWIDTH] IN BLOOD BY AUTOMATED COUNT: 14.6 % (ref 12.3–15.4)
FERRITIN SERPL-MCNC: 165 NG/ML (ref 13–150)
FOLATE SERPL-MCNC: >20 NG/ML (ref 4.78–24.2)
GLOBULIN SER CALC-MCNC: 2.5 GM/DL
GLUCOSE SERPL-MCNC: 107 MG/DL (ref 65–99)
GLUCOSE UR QL STRIP: NEGATIVE
HBA1C MFR BLD: 6 % (ref 4.8–5.6)
HCT VFR BLD AUTO: 34.6 % (ref 34–46.6)
HDLC SERPL-MCNC: 56 MG/DL (ref 40–60)
HGB BLD-MCNC: 11.6 G/DL (ref 12–15.9)
HGB UR QL STRIP: NEGATIVE
IMM GRANULOCYTES # BLD AUTO: 0.02 10*3/MM3 (ref 0–0.05)
IMM GRANULOCYTES NFR BLD AUTO: 0.4 % (ref 0–0.5)
IRON SATN MFR SERPL: 12 % (ref 20–50)
IRON SERPL-MCNC: 40 MCG/DL (ref 37–145)
KETONES UR QL STRIP: ABNORMAL
LDLC SERPL CALC-MCNC: 120 MG/DL (ref 0–100)
LEUKOCYTE ESTERASE UR QL STRIP: NEGATIVE
LYMPHOCYTES # BLD AUTO: 2.59 10*3/MM3 (ref 0.7–3.1)
LYMPHOCYTES NFR BLD AUTO: 45.9 % (ref 19.6–45.3)
MCH RBC QN AUTO: 28.4 PG (ref 26.6–33)
MCHC RBC AUTO-ENTMCNC: 33.5 G/DL (ref 31.5–35.7)
MCV RBC AUTO: 84.6 FL (ref 79–97)
MONOCYTES # BLD AUTO: 0.47 10*3/MM3 (ref 0.1–0.9)
MONOCYTES NFR BLD AUTO: 8.3 % (ref 5–12)
MUCOUS THREADS URNS QL MICRO: ABNORMAL /HPF
NEUTROPHILS # BLD AUTO: 2.47 10*3/MM3 (ref 1.7–7)
NEUTROPHILS NFR BLD AUTO: 43.8 % (ref 42.7–76)
NITRITE UR QL STRIP: NEGATIVE
NRBC BLD AUTO-RTO: 0 /100 WBC (ref 0–0.2)
PH UR STRIP: 6 [PH] (ref 5–8)
PLATELET # BLD AUTO: 480 10*3/MM3 (ref 140–450)
POTASSIUM SERPL-SCNC: 3.6 MMOL/L (ref 3.5–5.2)
PROT SERPL-MCNC: 6.9 G/DL (ref 6–8.5)
PROT UR QL STRIP: ABNORMAL
RBC # BLD AUTO: 4.09 10*6/MM3 (ref 3.77–5.28)
RBC #/AREA URNS HPF: ABNORMAL /HPF
SODIUM SERPL-SCNC: 139 MMOL/L (ref 136–145)
SP GR UR STRIP: 1.03 (ref 1–1.03)
T3FREE SERPL-MCNC: 3.1 PG/ML (ref 2–4.4)
T4 FREE SERPL-MCNC: 1.17 NG/DL (ref 0.93–1.7)
TIBC SERPL-MCNC: 338 MCG/DL
TRIGL SERPL-MCNC: 122 MG/DL (ref 0–150)
TSH SERPL DL<=0.005 MIU/L-ACNC: 1.33 UIU/ML (ref 0.27–4.2)
UIBC SERPL-MCNC: 298 MCG/DL (ref 112–346)
UROBILINOGEN UR STRIP-MCNC: ABNORMAL MG/DL
VIT B12 SERPL-MCNC: 1935 PG/ML (ref 211–946)
VLDLC SERPL CALC-MCNC: 22 MG/DL (ref 5–40)
WBC # BLD AUTO: 5.64 10*3/MM3 (ref 3.4–10.8)
WBC #/AREA URNS HPF: ABNORMAL /HPF
WRITTEN AUTHORIZATION: NORMAL
YEAST #/AREA URNS HPF: ABNORMAL /HPF

## 2023-12-19 ENCOUNTER — TELEPHONE (OUTPATIENT)
Dept: FAMILY MEDICINE CLINIC | Facility: CLINIC | Age: 63
End: 2023-12-19
Payer: OTHER GOVERNMENT

## 2023-12-19 NOTE — TELEPHONE ENCOUNTER
Caller: Henny James    Relationship: Self    Best call back number: [phone]     What was the call regarding: PATIENT STATED THAT SHE IS NEEDING TO KNOW WHAT VACCINES ARE RECOMMENDED FOR HER TO TAKE SINCE SHE DOES NOT HAVE A SPLEEN. PLEASE ADVISE.

## 2024-01-07 ENCOUNTER — HOSPITAL ENCOUNTER (OUTPATIENT)
Facility: HOSPITAL | Age: 64
Discharge: HOME | End: 2024-01-07
Admitting: PHYSICIAN ASSISTANT
Payer: OTHER GOVERNMENT

## 2024-01-07 PROCEDURE — 0 DIATRIZOATE MEGLUMINE & SODIUM PER 1 ML: Performed by: PHYSICIAN ASSISTANT

## 2024-01-07 RX ADMIN — DIATRIZOATE MEGLUMINE AND DIATRIZOATE SODIUM 30 ML: 600; 100 SOLUTION ORAL; RECTAL at 08:59

## 2024-01-11 ENCOUNTER — OFFICE VISIT (OUTPATIENT)
Dept: SURGERY | Facility: CLINIC | Age: 64
End: 2024-01-11
Payer: OTHER GOVERNMENT

## 2024-01-11 VITALS
SYSTOLIC BLOOD PRESSURE: 118 MMHG | OXYGEN SATURATION: 96 % | HEIGHT: 65 IN | DIASTOLIC BLOOD PRESSURE: 82 MMHG | WEIGHT: 223.6 LBS | BODY MASS INDEX: 37.25 KG/M2 | HEART RATE: 69 BPM

## 2024-01-11 DIAGNOSIS — R63.4 WEIGHT LOSS, UNINTENTIONAL: ICD-10-CM

## 2024-01-11 DIAGNOSIS — R19.5 CHANGE IN STOOL CALIBER: ICD-10-CM

## 2024-01-11 DIAGNOSIS — Z85.09 HISTORY OF GASTROINTESTINAL STROMAL TUMOR (GIST): Primary | ICD-10-CM

## 2024-01-11 PROCEDURE — 99203 OFFICE O/P NEW LOW 30 MIN: CPT | Performed by: SURGERY

## 2024-01-11 NOTE — PROGRESS NOTES
General Surgery  Initial Office Visit    CC: Weight loss, change in stool caliber    HPI: The patient is a pleasant 63 y.o. year-old lady who presents today for evaluation of an unintentional 16 pound weight loss that occurred suddenly over a few weeks time right after Thanksgiving.  During that time she had a change in her stool caliber to pencil thin stools.  Her stools were also much looser and she had some generalized abdominal cramping at that time.  Her symptoms have largely resolved and her weight has picked back up close to her baseline weight of 229 pounds.  Today she is 223 pounds, but has gotten as low as 213 pounds right after Thanksgiving.  She has a history of gastrointestinal stromal tumor requiring wedge resection of part of the stomach followed by Gleevec chemotherapy back in .  She has had no real follow-up with any medical oncologist and has had no follow-up imaging since  where she had a CT chest/abdomen/pelvis that showed no evidence of disease.  She has not noticed any blood in her stool.  She has never had a colonoscopy.    Past Medical History:   History of GIST tumor of stomach s/p resection and Gleevec  Atrial fibrillation on anticoagulation  Coronary artery disease  Hypertension  Hyperlipidemia  Prediabetes  Bipolar disorder  Osteoarthritis of the knee  History of TIA    Past Surgical History:   Open gastric wedge resection ()  Splenectomy    Endometrial biopsy  Right ankle surgery  Tubal ligation    Medications:   Xanax 1 mg 3 times daily as needed for anxiety  Amlodipine 10 mg daily  Abilify 10 mg nightly  Aspirin 81 mg daily  Azilsartan-chlorthalidone 40-12.5 mg twice daily  Carvedilol 25 mg twice daily  Hydralazine 75 mg 3 times daily  Trazodone 50 mg nightly  Lotrisone cream as needed  Lamictal 200 mg nightly    Allergies: Metoprolol succinate (weight gain), spironolactone (fatigue)    Family History: Father with history of hypertension, daughter with history of  bipolar disorder, son with history of alcohol abuse    Social History: , non-smoker, social alcohol use, social marijuana use    ROS:   Constitutional: Positive for unintentional weight loss; negative for fevers or chills  HENT: Negative for hearing loss or runny nose  Eyes: Negative for vision changes or scleral icterus  Respiratory: Negative for cough or shortness of breath  Cardiovascular: Negative for chest pain or heart palpitations  Gastrointestinal: Positive for abdominal pain, diarrhea, and change in her stool caliber; negative for abdominal distension, nausea, vomiting, constipation, melena, or hematochezia  Genitourinary: Negative for hematuria or dysuria  Musculoskeletal: Negative for joint swelling or gait instability  Neurologic: Negative for tremors or seizures  Psychiatric: Negative for suicidal ideations or agitation  All other systems reviewed and negative    Physical Exam:  Height: 165 cm  Weight: 101 kg (223 pound)  BMI: 37.2  General: No acute distress, well-nourished & well-developed  HEAD: normocephalic, atraumatic  EYES: normal conjunctiva, sclera anicteric  EARS: grossly normal hearing  NECK: supple, no thyromegaly  CARDIOVASCULAR: regular rate and rhythm  RESPIRATORY: clear to auscultation bilaterally  GASTROINTESTINAL: soft, nontender, non-distended  MUSCULOSKELETAL: normal gait and station. No gross extremity abnormalities  PSYCHIATRIC: oriented x3, normal mood and affect    IMAGING:  Nothing recent    ASSESSMENT & PLAN  Mrs. James is a 63-year lady with a history of a gastrointestinal stromal tumor now with unintentional weight loss, abdominal pain, and a change in her stool caliber concerning for potentially a gastrointestinal malignancy.  She has a CT scan scheduled for 1/23 which I will review and call her with the results.  Following that CT, I would recommend we schedule her for an EGD and colonoscopy to rule out a gastrointestinal source of malignancy and unintentional weight  loss.    Sara Wheeler MD  General, Robotic, and Endoscopic Surgery  [facility]    [address], [address]  [place] [address]  P: [phone]  F: [phone]

## 2024-01-23 ENCOUNTER — HOSPITAL ENCOUNTER (OUTPATIENT)
Dept: CT IMAGING | Facility: HOSPITAL | Age: 64
Discharge: HOME | End: 2024-01-23
Admitting: PHYSICIAN ASSISTANT
Payer: OTHER GOVERNMENT

## 2024-01-23 LAB — CREAT BLDA-MCNC: 0.6 MG/DL (ref 0.6–1.3)

## 2024-01-23 PROCEDURE — 25510000001 IOPAMIDOL 61 % SOLUTION: Performed by: PHYSICIAN ASSISTANT

## 2024-01-23 PROCEDURE — 82565 ASSAY OF CREATININE: CPT

## 2024-01-23 PROCEDURE — 0 DIATRIZOATE MEGLUMINE & SODIUM PER 1 ML: Performed by: PHYSICIAN ASSISTANT

## 2024-01-23 PROCEDURE — 74177 CT ABD & PELVIS W/CONTRAST: CPT

## 2024-01-23 PROCEDURE — 71260 CT THORAX DX C+: CPT

## 2024-01-23 RX ADMIN — IOPAMIDOL 85 ML: 612 INJECTION, SOLUTION INTRAVENOUS at 07:42

## 2024-01-23 RX ADMIN — DIATRIZOATE MEGLUMINE AND DIATRIZOATE SODIUM 30 ML: 600; 100 SOLUTION ORAL; RECTAL at 06:42

## 2024-01-24 ENCOUNTER — PREP FOR SURGERY (OUTPATIENT)
Dept: OTHER | Facility: HOSPITAL | Age: 64
End: 2024-01-24
Payer: OTHER GOVERNMENT

## 2024-01-24 ENCOUNTER — TELEPHONE (OUTPATIENT)
Dept: SURGERY | Facility: CLINIC | Age: 64
End: 2024-01-24
Payer: OTHER GOVERNMENT

## 2024-01-24 DIAGNOSIS — Z12.11 SCREENING FOR COLON CANCER: ICD-10-CM

## 2024-01-24 DIAGNOSIS — R63.4 WEIGHT LOSS, UNINTENTIONAL: ICD-10-CM

## 2024-01-24 DIAGNOSIS — R19.5 CHANGE IN STOOL CALIBER: ICD-10-CM

## 2024-01-24 DIAGNOSIS — Z85.09 HISTORY OF GASTROINTESTINAL STROMAL TUMOR (GIST): Primary | ICD-10-CM

## 2024-01-24 NOTE — TELEPHONE ENCOUNTER
I called Henny after reviewing her CT scan done yesterday.  I left a voicemail on her cell phone regarding the findings.  She has a few nodules in the left upper quadrant near the tail of the pancreas that most likely represents splenule's, as she had a splenule of the left upper quadrant found on her previous CT in 2013 (although smaller at that time).  I personally see no evidence for an intraluminal gastric mass at the site of her previous GIST resection.  I do think it would be prudent to schedule her for an EGD given her history of GIST and also perform a colonoscopy as she is 63 years old and has never previously undergone colonoscopic evaluation.  I asked her to please call me back to get that arranged.  Lastly, I agree with Mary Haddad and that she needs to be established with a medical oncologist given her history of GIST, just for long-term surveillance.  A referral has already been placed to Dr. Garcias yesterday.    If she calls back to schedule the EGD and colonoscopy, she will need to hold her aspirin for 5 days beforehand.

## 2024-01-25 ENCOUNTER — TELEPHONE (OUTPATIENT)
Dept: SURGERY | Facility: CLINIC | Age: 64
End: 2024-01-25
Payer: OTHER GOVERNMENT

## 2024-02-14 ENCOUNTER — OFFICE VISIT (OUTPATIENT)
Dept: CARDIOLOGY | Facility: CLINIC | Age: 64
End: 2024-02-14
Payer: OTHER GOVERNMENT

## 2024-02-14 VITALS
BODY MASS INDEX: 38.99 KG/M2 | SYSTOLIC BLOOD PRESSURE: 128 MMHG | HEART RATE: 55 BPM | HEIGHT: 65 IN | DIASTOLIC BLOOD PRESSURE: 82 MMHG | WEIGHT: 234 LBS

## 2024-02-14 DIAGNOSIS — I10 PRIMARY HYPERTENSION: ICD-10-CM

## 2024-02-14 DIAGNOSIS — I25.10 CORONARY ARTERY DISEASE INVOLVING NATIVE CORONARY ARTERY OF NATIVE HEART WITHOUT ANGINA PECTORIS: Primary | ICD-10-CM

## 2024-02-14 PROCEDURE — 99214 OFFICE O/P EST MOD 30 MIN: CPT | Performed by: INTERNAL MEDICINE

## 2024-02-14 PROCEDURE — 93000 ELECTROCARDIOGRAM COMPLETE: CPT | Performed by: INTERNAL MEDICINE

## 2024-03-07 ENCOUNTER — TELEPHONE (OUTPATIENT)
Dept: CARDIOLOGY | Facility: CLINIC | Age: 64
End: 2024-03-07
Payer: OTHER GOVERNMENT

## 2024-03-07 NOTE — TELEPHONE ENCOUNTER
I called their office back to provide this message, and I've also faxed over the message.  They verbalized understanding and will notify the pt when they reschedule her surgery.    Thank you,    Carol CROCKETT RN  Triage [facility] [place]  03/07/24 12:53 EST

## 2024-03-07 NOTE — TELEPHONE ENCOUNTER
Matthias and Isabel called the office to FU on a clearance form that was faxed to our office on 2/16/24.  Pt is scheduled to have cataract surgery on 3/15 under block anesthesia and will subsequently need to hold her ASA 81 mg QD for 10 days prior.  Either way, her surgery will need to be rescheduled.    Do you have any recommendations for this patient?    Their office callback: [phone]  Their office fax: [phone]    Thank you,    Carol CROCKETT RN  [facility] [place] [facility]  03/07/24  10:41 EST

## 2024-03-12 ENCOUNTER — OFFICE VISIT (OUTPATIENT)
Dept: FAMILY MEDICINE CLINIC | Facility: CLINIC | Age: 64
End: 2024-03-12
Payer: OTHER GOVERNMENT

## 2024-03-12 VITALS
SYSTOLIC BLOOD PRESSURE: 185 MMHG | TEMPERATURE: 97.1 F | DIASTOLIC BLOOD PRESSURE: 90 MMHG | HEIGHT: 65 IN | HEART RATE: 74 BPM | BODY MASS INDEX: 37.32 KG/M2 | WEIGHT: 224 LBS | RESPIRATION RATE: 16 BRPM

## 2024-03-12 DIAGNOSIS — R73.03 PREDIABETES: ICD-10-CM

## 2024-03-12 DIAGNOSIS — D75.839 THROMBOCYTOSIS: ICD-10-CM

## 2024-03-12 DIAGNOSIS — G57.93 NEUROPATHY OF BOTH FEET: Primary | ICD-10-CM

## 2024-03-12 DIAGNOSIS — I10 UNCONTROLLED HYPERTENSION: ICD-10-CM

## 2024-03-12 DIAGNOSIS — E78.2 MIXED HYPERLIPIDEMIA: ICD-10-CM

## 2024-03-12 DIAGNOSIS — F31.9 BIPOLAR 1 DISORDER: ICD-10-CM

## 2024-03-12 DIAGNOSIS — I65.21 STENOSIS OF RIGHT CAROTID ARTERY: ICD-10-CM

## 2024-03-12 DIAGNOSIS — E55.9 VITAMIN D DEFICIENCY: ICD-10-CM

## 2024-03-12 DIAGNOSIS — C49.A0 GASTROINTESTINAL STROMAL TUMOR: ICD-10-CM

## 2024-03-12 PROCEDURE — 99214 OFFICE O/P EST MOD 30 MIN: CPT | Performed by: PHYSICIAN ASSISTANT

## 2024-03-12 RX ORDER — METFORMIN HYDROCHLORIDE 500 MG/1
TABLET, EXTENDED RELEASE ORAL
Qty: 60 TABLET | Refills: 5 | Status: SHIPPED | OUTPATIENT
Start: 2024-03-12

## 2024-03-12 NOTE — Clinical Note
Patient saw you last month her blood pressure has been fluctuating still very concerned it is 185/90 today here in the office with me and she is already taking the hydralazine now at 100 mg 3 times a day.... She has a increase the dose herself to 100 mg....

## 2024-03-12 NOTE — PROGRESS NOTES
"Subjective   Henny James is a 63 y.o. female.     History of Present Illness    Since the last visit, she has overall felt tired.  She has Hyperlipidemia and working on this with diet and exercise, Vitamin D deficiency and will update labs for continued management, and primary hypertension not controlled and consulting with her cardiologist that treats her for this Dr. Rodriguez about next steps because blood pressure systolic is 185 today also impaired fasting glucose prediabetes plan to start metformin noting she does had labs and renal functions were in range concerned that her A1c was 6.0..  Mixed hyperlipidemia intolerant to atorvastatin and Crestor and does not want to try another statin at this time.  Here today because of neuropathy in her feet numb tingling and burning in toes both feet.... Needs workup of etiology she is not interested in doing oral gabapentin but we are going to try a topical compound for diabetic toes that Rx alternative makes to help with relief. .  she has been compliant with current medications have reviewed them.  The patient denies medication side effects.  Will refill medications. BP (!) 185/90   Pulse 74   Temp 97.1 °F (36.2 °C)   Resp 16   Ht 165.1 cm (65\")   Wt 102 kg (224 lb)   BMI 37.28 kg/m²     Results for orders placed or performed during the hospital encounter of 01/23/24   POC Creatinine    Specimen: Blood   Result Value Ref Range    Creatinine 0.60 0.60 - 1.30 mg/dL     Lab Results   Component Value Date    GLUCOSE 107 (H) 12/13/2023    BUN 13 12/13/2023    CREATININE 0.60 01/23/2024    EGFRRESULT 92.2 12/13/2023    EGFR 107.0 08/02/2022    BCR 17.6 12/13/2023    K 3.6 12/13/2023    CO2 25.7 12/13/2023    CALCIUM 9.6 12/13/2023    PROTENTOTREF 6.9 12/13/2023    ALBUMIN 4.4 12/13/2023    BILITOT 0.2 12/13/2023    AST 18 12/13/2023    ALT 18 12/13/2023     Lab Results   Component Value Date    CHOL 263 (H) 08/25/2020    CHLPL 198 12/13/2023    TRIG 122 12/13/2023    " HDL 56 12/13/2023     (H) 12/13/2023     Lab Results   Component Value Date    TSH 1.330 12/13/2023     Lab Results   Component Value Date    HGBA1C 6.00 (H) 12/13/2023       Weight is increased from last visit  Blood pressure has been fluctuating and staying high just saw cardiology last month for uncontrolled hypertension follow-up    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 Underwent resection and pathology ([id number]) confirmed malignant GIST , measuring 7 cm in greatest dimension. Given the risk for recurrence of her gastrointestinal stromal tumor, it was decided to treat her adjuvantly with imatinib. She discontinued imatinib in September/October 2012 by her own choosing citing the side-effects including distention of the stomach and absence of menses. She followed up until February 2013 and afterward did not return for follow up. Due to pulmonary embolism in 2011 she was on Coumadin but she discontinued on her own. Instead she started herself on 2 tablets of 81 milligrams aspirin daily regimen. A CT scan, done in February of 2013, showed no findings concerning for recurrent disease   She also was seen for thrombocytopenia  I had patient do abdominal CT abdomen and pelvis and CT chest with contrast 1/23/2024 and I noted  The CT that patient has is from 2013 from [place]... The report is under imaging.... We will copy Dr. Wheeler on findings... Patient to have colonoscopy and EGD... Also referring patient to oncology to help follow this complicated history of stromal tumor.  I am concerned about the findings on this present CT scan of possible splenule's versus residual recurrent disease  I had her see Dr. Kenny Wheeler general surgeon on 1124 for the  concern with the gastrointestinal stromal tumor and weight loss----she is scheduling EGD and colonoscopy to rule out GI malignancy.  She also needs to keep the appointment with Dr. Garcias scheduled for 3/26/2024, oncology.... She needs to be monitored closely due to the history of this gastrointestinal stromal tumor and thrombocytosis  Had labs 12/13/2023  Platelet count is in baselines and patient has been evaluated by hematology for this.  Hemoglobin decreased just a bit from last labs and I will add on iron studies...  Patient has history of stroke and TIA and is not on a statin..... Noted in her history of myalgias with atorvastatin and will have her start Crestor at 20 mg daily..... Sending prescription.  Glucose and hemoglobin A1c, average glucose for 2 to 3 months is in prediabetes range--- advise low glycemic index diet and exercise.. Labs show low Vitamin D levels.  I want you to add OTC Vitamin D 2,000 I.U. Daily.  B12 level is a bit high and if you are taking supplement okay to reduce dosing to 3 times a week.  Other labs are in acceptable range  Will asked patient to do a follow-up visit noting she has incidental findings of coronary artery calcifications and cardiac enlargemen  Last saw cardio--Dr Rodriguez 2-14-24 CAD ----HTN----  Hypertension patient been very difficult to control here in the recent year or 2 she is doing very well with this on a good medical regimen.  Will continue the same.  History of stroke.  This is believed to be secondary to hypertensive crisis/emergency.  Also paroxysmal atrial fibrillation in years past.  Follow-up yearly  Neg for sleep apnea     Dr Bowles-----is psych  Having cataract surgery on Friday  Also need patient to schedule carotid Doppler noting CTA of the neck from 2021 mild stenosis 30 to 40% in the right carotid artery  The following portions of the patient's history were reviewed and updated as appropriate: allergies, current medications, past family history, past  medical history, past social history, past surgical history, and problem list.    Review of Systems   Constitutional:  Negative for diaphoresis.   HENT:  Negative for nosebleeds and trouble swallowing.    Eyes:  Negative for blurred vision and visual disturbance.   Respiratory:  Negative for choking.    Gastrointestinal:  Negative for blood in stool.   Allergic/Immunologic: Negative for immunocompromised state.   Neurological:  Positive for numbness. Negative for facial asymmetry and speech difficulty.   Psychiatric/Behavioral:  Negative for self-injury and suicidal ideas.        Objective   Physical Exam  Vitals and nursing note reviewed.   Constitutional:       General: She is not in acute distress.     Appearance: She is well-developed. She is not ill-appearing or toxic-appearing.   HENT:      Head: Normocephalic.      Right Ear: External ear normal.      Left Ear: External ear normal.      Nose: Nose normal.      Mouth/Throat:      Pharynx: Oropharynx is clear.   Eyes:      General: No scleral icterus.     Conjunctiva/sclera: Conjunctivae normal.      Pupils: Pupils are equal, round, and reactive to light.   Neck:      Thyroid: No thyromegaly.      Vascular: No carotid bruit.   Cardiovascular:      Rate and Rhythm: Normal rate and regular rhythm.      Heart sounds: Murmur heard.   Pulmonary:      Effort: Pulmonary effort is normal. No respiratory distress.      Breath sounds: Normal breath sounds.   Musculoskeletal:         General: No deformity. Normal range of motion.      Cervical back: Normal range of motion and neck supple.      Right lower leg: No edema.      Left lower leg: No edema.   Skin:     General: Skin is warm and dry.      Findings: No rash.   Neurological:      General: No focal deficit present.      Mental Status: She is alert and oriented to person, place, and time. Mental status is at baseline.      Sensory: Sensory deficit present.   Psychiatric:         Mood and Affect: Mood normal.          Behavior: Behavior normal.         Thought Content: Thought content normal.         Judgment: Judgment normal.           Assessment & Plan   Diagnoses and all orders for this visit:    1. Neuropathy of both feet (Primary)  -     Ambulatory Referral to Neurology    2. Uncontrolled hypertension    3. Bipolar 1 disorder    4. Stenosis of right carotid artery  -     Duplex Carotid Ultrasound CAR    5. Mixed hyperlipidemia    6. Thrombocytosis    7. Prediabetes  Comments:  spleenectomy    8. Gastrointestinal stromal tumor  Comments:  To see Dr. Garcias oncology and to have EGD and colonoscopy with Dr. Wheeler        Carotid Doppler to follow-up on mild carotid artery stenosis right from 2021 CTA of neck  To see Dr Garcias  Intol to statins----Crestor and Lipitor  Refer neuropathy in feet----to neuropathy  Due to have EGD and colonoscopy  I will order compound cream  --- Will order topical from Rx alternatives or diabetic toes that contains K4, G6, clonidine, nifedipine with DMSO--- have her apply 3-4 times a day for pain relief  I will message Dr. Rodriguez she has very high blood pressure today 185/90 and she is already taking the hydralazine at 100 mg total dose 3 times a day  Labs 3 mos  Will have her start metformin XR for prediabetes start it 1 tablet after your largest meal and then after 1 week increase to 2 tablets daily... Watch for diarrhea    D/w Dr Rodriguez--pt must limit salt-----she does not want to go to [facility]... Dr. Rodriguez said that was the neck step but also patient needs to really limit salt and I just called her..  She has not taken the second dose of hydralazine when I saw her today she is taking it now that she is home and will continue to monitor her blood pressure and update us on readings  Adding the metformin should help her get her weight down..  I am reluctant to put her on semaglutide with her history of stromal tumor... Will have her ask oncology if any concern on their end

## 2024-03-21 ENCOUNTER — TELEPHONE (OUTPATIENT)
Dept: FAMILY MEDICINE CLINIC | Facility: CLINIC | Age: 64
End: 2024-03-21
Payer: OTHER GOVERNMENT

## 2024-03-21 NOTE — TELEPHONE ENCOUNTER
I spoke with patient and gave her the information. She said she knew nothing about the referrals being placed. I did explain the why for the referrals.  She said she is on a fixed income, and not sure that she will make the appointments.

## 2024-03-21 NOTE — TELEPHONE ENCOUNTER
"Hub staff attempted to follow warm transfer process and was unsuccessful     Caller: Henny James    Relationship to patient: Self    Best call back number:     [phone] (Mobile)       Patient is needing:TO SPEAK WITH SOMEONE AND FIND OUT WHY SHE IS BEING REFERRED OUT TO \"ALL THESE DIFFERENT PLACES\". PATIENT IS QUITE FRUSTRATED SHE STATES SHE DOESN'T UNDERSTAND WHY SHE'S BEING REFERRED TO PODIATRY SINCE SHE WAS GIVEN A CREAM TO PUT ON HER FEET. PATIENT STATES SHE DOESN'T HAVE THE MONEY TO GO TO SEVERAL DIFFERENT SPECIALIST.     PLEASE ADVISE.   "

## 2024-03-21 NOTE — TELEPHONE ENCOUNTER
She is a very complex patient and every time she comes in I have had to spend at least 30 minutes or more with her..... She does need to see the neurologist for the peripheral neuropathy.  If she is referring to seeing the oncologist that is because she had a gastric tumor years ago and it can recur.... She needs to see oncology.... I explained everything in my last note

## 2024-03-27 ENCOUNTER — TELEPHONE (OUTPATIENT)
Dept: ONCOLOGY | Facility: CLINIC | Age: 64
End: 2024-03-27

## 2024-03-27 NOTE — TELEPHONE ENCOUNTER
Caller: Henny James    Relationship to patient: Self    Best call back number: [phone]    Type of visit: NEW PATIENT LAB AND CONSULT    Requested date: CALL PATIENT TO SCHEDULE     If rescheduling, when is the original appointment: 3/26/24

## 2024-04-19 RX ORDER — AMLODIPINE BESYLATE 10 MG/1
10 TABLET ORAL DAILY
Qty: 90 TABLET | Refills: 3 | Status: SHIPPED | OUTPATIENT
Start: 2024-04-19

## 2024-05-01 ENCOUNTER — TELEPHONE (OUTPATIENT)
Dept: FAMILY MEDICINE CLINIC | Facility: CLINIC | Age: 64
End: 2024-05-01
Payer: OTHER GOVERNMENT

## 2024-05-01 NOTE — TELEPHONE ENCOUNTER
Caller: Henny James    Relationship: Self    Best call back number: [phone]     What is the best time to reach you: ANY    Who are you requesting to speak with (clinical staff, provider,  specific staff member): CLINICAL STAFF    Do you know the name of the person who called:      What was the call regarding: PATIENT CALLED STATED SHE WAS PUT ON THE MEDICATION     metFORMIN ER (GLUCOPHAGE-XR) 500 MG 24 hr tablet   ABOUT TWO MONTHS AGO.  SHE IS NOT LOSING ANY WEIGHT BUT HAS GAINED WEIGHT ON THIS MEDICATION.  SHE HAS NOT DROPPED ANY WEIGHT AND THIS MEDICATION IS NOT WORKING FOR HER.  IT HAS NOT SUPPRESSED HER APPETITE AT ALL.  SHE WANTS TO KNOW WHAT SHOULD SHE DO NEXT.  PLEASE CALL HER BACK.      Is it okay if the provider responds through MyChart:

## 2024-05-08 LAB
25(OH)D3+25(OH)D2 SERPL-MCNC: 28.5 NG/ML (ref 30–100)
ALBUMIN SERPL-MCNC: 4.4 G/DL (ref 3.5–5.2)
ALBUMIN/GLOB SERPL: 1.4 G/DL
ALP SERPL-CCNC: 117 U/L (ref 39–117)
ALT SERPL-CCNC: 17 U/L (ref 1–33)
AST SERPL-CCNC: 15 U/L (ref 1–32)
BILIRUB SERPL-MCNC: <0.2 MG/DL (ref 0–1.2)
BUN SERPL-MCNC: 14 MG/DL (ref 8–23)
BUN/CREAT SERPL: 24.1 (ref 7–25)
CALCIUM SERPL-MCNC: 9.7 MG/DL (ref 8.6–10.5)
CHLORIDE SERPL-SCNC: 103 MMOL/L (ref 98–107)
CHOLEST SERPL-MCNC: 235 MG/DL (ref 0–200)
CO2 SERPL-SCNC: 21.5 MMOL/L (ref 22–29)
CREAT SERPL-MCNC: 0.58 MG/DL (ref 0.57–1)
EGFRCR SERPLBLD CKD-EPI 2021: 103.1 ML/MIN/1.73
GLOBULIN SER CALC-MCNC: 3.2 GM/DL
GLUCOSE SERPL-MCNC: 105 MG/DL (ref 65–99)
HBA1C MFR BLD: 6.1 % (ref 4.8–5.6)
HDLC SERPL-MCNC: 82 MG/DL (ref 40–60)
LDLC SERPL CALC-MCNC: 146 MG/DL (ref 0–100)
POTASSIUM SERPL-SCNC: 4.3 MMOL/L (ref 3.5–5.2)
PROT SERPL-MCNC: 7.6 G/DL (ref 6–8.5)
SODIUM SERPL-SCNC: 137 MMOL/L (ref 136–145)
TRIGL SERPL-MCNC: 41 MG/DL (ref 0–150)
VLDLC SERPL CALC-MCNC: 7 MG/DL (ref 5–40)

## 2024-05-14 ENCOUNTER — OFFICE VISIT (OUTPATIENT)
Dept: FAMILY MEDICINE CLINIC | Facility: CLINIC | Age: 64
End: 2024-05-14
Payer: OTHER GOVERNMENT

## 2024-05-14 VITALS
RESPIRATION RATE: 16 BRPM | HEART RATE: 76 BPM | TEMPERATURE: 97.6 F | HEIGHT: 65 IN | WEIGHT: 237 LBS | SYSTOLIC BLOOD PRESSURE: 172 MMHG | BODY MASS INDEX: 39.49 KG/M2 | DIASTOLIC BLOOD PRESSURE: 88 MMHG

## 2024-05-14 DIAGNOSIS — E78.2 MIXED HYPERLIPIDEMIA: ICD-10-CM

## 2024-05-14 DIAGNOSIS — C49.A0 MALIGNANT GASTROINTESTINAL STROMAL TUMOR, UNSPECIFIED SITE: ICD-10-CM

## 2024-05-14 DIAGNOSIS — R73.03 PREDIABETES: Primary | ICD-10-CM

## 2024-05-14 DIAGNOSIS — I10 UNCONTROLLED HYPERTENSION: ICD-10-CM

## 2024-05-14 PROCEDURE — 99213 OFFICE O/P EST LOW 20 MIN: CPT | Performed by: PHYSICIAN ASSISTANT

## 2024-05-14 NOTE — PROGRESS NOTES
"Subjective   Henny James is a 63 y.o. female.     History of Present Illness   Henny James 63 y.o. female /88   Pulse 76   Temp 97.6 °F (36.4 °C)   Resp 16   Ht 165.1 cm (65\")   Wt 108 kg (237 lb)   BMI 39.44 kg/m²  who presents today for following up on her peripheral neuropathy and impaired fasting glucose with trial of topical compound cream and metformin.  She did not have any diarrhea with the metformin but it did make her hungry and her weight is up 7 pounds and her A1c increased from last labs we will have her stop the metformin.  Discussing today why a prescription for semaglutide is contraindicated in a patient with a history of neuroendocrine type tumor.  She is still wanting to get her weight down and my neck step would be to consider having her see bariatric.  She had steroid injections in her knees with Dr. Mathis and this also raises her blood pressure that is high today.  The compound for the neuropathy made her feet swell and she had to quit using and does not want oral gabapentin.  she has a history of   Patient Active Problem List   Diagnosis    Abnormal urine finding    Anemia    Atrial fibrillation    CAD (coronary artery disease), native coronary artery    Chest pain syndrome    Chronic left hip pain    Dizziness    Fatigue    HLD (hyperlipidemia)    Primary hypertension    Intermittent claudication    Long term (current) use of anticoagulants    Lower abdominal pain    Neoplasm of uncertain behavior of connective and other soft tissue    Palpitations    Personal history of other specified conditions presenting hazards to health    Personal history of tobacco use, presenting hazards to health    Thrombocytosis    Transient ischemic attack (TIA), and cerebral infarction without residual deficits(V12.54)    Uncontrolled hypertension    Hypokalemia    Bipolar 1 disorder    Prediabetes    Psychophysiological insomnia    Hypertensive encephalopathy    Noncompliance with medication " regimen    Achilles tendinitis of right lower extremity    Brachial neuritis or radiculitis    Hypertensive urgency    Rotator cuff syndrome    Osteoarthritis of knee    Pain in joint involving shoulder region    Pain in right hip    Hypercholesteremia    Primary hypertension    Stroke-like symptom    S/P splenectomy    Malignant gastrointestinal stromal tumor   .      Patient has known history of hypertension and is under care of cardiologist Dr. Rodriguez and I noted last visit on 3/12/2024 blood pressure remains elevated and Dr. Rodriguez advised that she limit salt and refer patient to [facility] for their cardiology clinic for uncontrolled hypertension.  I also ordered a topical compound for her neuropathy pain--contains K4, G6, clonidine, nifedipine with DMSO--- have her apply 3-4 times a day for pain relief --- she declined oral gabapentin  Last visit on 3/12/2024 I started her on metformin XR for prediabetes and were following up today  Lab Results   Component Value Date    HGBA1C 6.10 (H) 05/07/2024     Lab Results   Component Value Date    GLUCOSE 105 (H) 05/07/2024    BUN 14 05/07/2024    CREATININE 0.58 05/07/2024    EGFRRESULT 103.1 05/07/2024    EGFR 107.0 08/02/2022    BCR 24.1 05/07/2024    K 4.3 05/07/2024    CO2 21.5 (L) 05/07/2024    CALCIUM 9.7 05/07/2024    PROTENTOTREF 7.6 05/07/2024    ALBUMIN 4.4 05/07/2024    BILITOT <0.2 05/07/2024    AST 15 05/07/2024    ALT 17 05/07/2024   Also labs from 5/7/2024 noted vitamin D 28.5,  Lab Results   Component Value Date    CHOL 263 (H) 08/25/2020    CHLPL 235 (H) 05/07/2024    TRIG 41 05/07/2024    HDL 82 (H) 05/07/2024     (H) 05/07/2024          Dr Bowles-----is psych  Last saw cardio--Dr Rodriguez 2-14-24 CAD ----HTN----  Hypertension patient been very difficult to control here in the recent year or 2 she is doing very well with this on a good medical regimen.  Will continue the same.  History of stroke.  This is believed to be secondary to  hypertensive crisis/emergency.  Also paroxysmal atrial fibrillation in years past.  Follow-up yearly  Neg for sleep apnea  I do want a note that patient has tried atorvastatin and Crestor and was intolerant due to myalgia and declines trying another statin at this time  Still needs to have EGD and colonoscopy due to history of GIST tumor and has been seen by Dr. Wheeler general surgeon and have referred her to oncology and she has not gone to the appointments  She was seen by orthopedic surgeon Dr. Mathis on 5/6/2024 following up on bilateral knee pain and received injections.  The following portions of the patient's history were reviewed and updated as appropriate: allergies, current medications, past family history, past medical history, past social history, past surgical history, and problem list.    Review of Systems   Constitutional:  Positive for fatigue. Negative for diaphoresis.   HENT:  Negative for nosebleeds and trouble swallowing.    Eyes:  Negative for blurred vision and visual disturbance.   Respiratory:  Negative for choking.    Gastrointestinal:  Negative for blood in stool.   Allergic/Immunologic: Negative for immunocompromised state.   Neurological:  Positive for numbness. Negative for facial asymmetry and speech difficulty.   Psychiatric/Behavioral:  Negative for self-injury and suicidal ideas.        Objective   Physical Exam  Vitals and nursing note reviewed.   Constitutional:       General: She is not in acute distress.     Appearance: She is well-developed. She is obese. She is not ill-appearing or toxic-appearing.   HENT:      Head: Normocephalic.      Right Ear: External ear normal.      Left Ear: External ear normal.      Nose: Nose normal.      Mouth/Throat:      Pharynx: Oropharynx is clear.   Eyes:      General: No scleral icterus.     Conjunctiva/sclera: Conjunctivae normal.      Pupils: Pupils are equal, round, and reactive to light.   Neck:      Thyroid: No thyromegaly.    Cardiovascular:      Rate and Rhythm: Normal rate and regular rhythm.      Heart sounds: Normal heart sounds. No murmur heard.  Pulmonary:      Effort: Pulmonary effort is normal. No respiratory distress.      Breath sounds: Normal breath sounds.   Musculoskeletal:         General: No deformity. Normal range of motion.      Cervical back: Normal range of motion and neck supple.   Skin:     General: Skin is warm and dry.      Findings: No rash.   Neurological:      General: No focal deficit present.      Mental Status: She is alert and oriented to person, place, and time. Mental status is at baseline.   Psychiatric:         Mood and Affect: Mood normal.         Behavior: Behavior normal.         Thought Content: Thought content normal.         Judgment: Judgment normal.           Assessment & Plan   Diagnoses and all orders for this visit:    1. Prediabetes (Primary)    2. Mixed hyperlipidemia    3. Uncontrolled hypertension    4. Malignant gastrointestinal stromal tumor, unspecified site      Still needs to have EGD and colonoscopy due to history of GIST tumor and has been seen by Dr. Wheeler general surgeon and have referred her to oncology and she has not gone to the appointments--- I see she is scheduled with Dr. Garcias on 5/21/2024.  I still want her to call centralized scheduling [phone] to set up the carotid Doppler study to reevaluate her carotid artery atherosclerosis on prior imaging  With her history of a neuroendocrine tumor of the semaglutide would be contraindicated---hx GIST tumor  Can stop taking metformin it made her hungry and her A1c increased on this lab my neck step would be consideration of bariatric surgery and refer her to Dr. Moulton---she wants to wait  Stop Metformin---makes her hungry and A1C went up on labs  Stop the topical compound that makes her feet swell she continues to have neuropathy pain and declines Rx for gabapentin  Followed by her psychiatrist Dr. Bowles

## 2024-05-21 ENCOUNTER — LAB (OUTPATIENT)
Dept: LAB | Facility: HOSPITAL | Age: 64
End: 2024-05-21
Payer: OTHER GOVERNMENT

## 2024-05-21 ENCOUNTER — CONSULT (OUTPATIENT)
Dept: ONCOLOGY | Facility: CLINIC | Age: 64
End: 2024-05-21
Payer: OTHER GOVERNMENT

## 2024-05-21 VITALS
WEIGHT: 231.2 LBS | OXYGEN SATURATION: 98 % | RESPIRATION RATE: 16 BRPM | HEART RATE: 59 BPM | TEMPERATURE: 97.8 F | SYSTOLIC BLOOD PRESSURE: 145 MMHG | HEIGHT: 65 IN | BODY MASS INDEX: 38.52 KG/M2 | DIASTOLIC BLOOD PRESSURE: 84 MMHG

## 2024-05-21 DIAGNOSIS — D64.9 NORMOCYTIC ANEMIA: ICD-10-CM

## 2024-05-21 DIAGNOSIS — C49.A2 MALIGNANT GASTROINTESTINAL STROMAL TUMOR (GIST) OF STOMACH: Primary | ICD-10-CM

## 2024-05-21 DIAGNOSIS — R79.89 ABNORMAL CBC: ICD-10-CM

## 2024-05-21 LAB
ALBUMIN SERPL-MCNC: 4 G/DL (ref 3.5–5.2)
ALBUMIN/GLOB SERPL: 1.1 G/DL
ALP SERPL-CCNC: 114 U/L (ref 39–117)
ALT SERPL W P-5'-P-CCNC: 18 U/L (ref 1–33)
ANION GAP SERPL CALCULATED.3IONS-SCNC: 13.1 MMOL/L (ref 5–15)
AST SERPL-CCNC: 15 U/L (ref 1–32)
BASOPHILS # BLD AUTO: 0.08 10*3/MM3 (ref 0–0.2)
BASOPHILS NFR BLD AUTO: 0.9 % (ref 0–1.5)
BILIRUB SERPL-MCNC: 0.2 MG/DL (ref 0–1.2)
BUN SERPL-MCNC: 14 MG/DL (ref 8–23)
BUN/CREAT SERPL: 28.6 (ref 7–25)
CALCIUM SPEC-SCNC: 9.1 MG/DL (ref 8.6–10.5)
CHLORIDE SERPL-SCNC: 102 MMOL/L (ref 98–107)
CO2 SERPL-SCNC: 22.9 MMOL/L (ref 22–29)
CREAT SERPL-MCNC: 0.49 MG/DL (ref 0.57–1)
DEPRECATED RDW RBC AUTO: 49.1 FL (ref 37–54)
EGFRCR SERPLBLD CKD-EPI 2021: 107.4 ML/MIN/1.73
EOSINOPHIL # BLD AUTO: 0.08 10*3/MM3 (ref 0–0.4)
EOSINOPHIL NFR BLD AUTO: 0.9 % (ref 0.3–6.2)
ERYTHROCYTE [DISTWIDTH] IN BLOOD BY AUTOMATED COUNT: 15.9 % (ref 12.3–15.4)
FERRITIN SERPL-MCNC: 150 NG/ML (ref 13–150)
FOLATE SERPL-MCNC: >20 NG/ML (ref 4.78–24.2)
GLOBULIN UR ELPH-MCNC: 3.6 GM/DL
GLUCOSE SERPL-MCNC: 98 MG/DL (ref 65–99)
HCT VFR BLD AUTO: 36.6 % (ref 34–46.6)
HGB BLD-MCNC: 12.3 G/DL (ref 12–15.9)
IMM GRANULOCYTES # BLD AUTO: 0.35 10*3/MM3 (ref 0–0.05)
IMM GRANULOCYTES NFR BLD AUTO: 4 % (ref 0–0.5)
IRON 24H UR-MRATE: 54 MCG/DL (ref 37–145)
IRON SATN MFR SERPL: 14 % (ref 20–50)
LYMPHOCYTES # BLD AUTO: 2.73 10*3/MM3 (ref 0.7–3.1)
LYMPHOCYTES NFR BLD AUTO: 31 % (ref 19.6–45.3)
MCH RBC QN AUTO: 28.7 PG (ref 26.6–33)
MCHC RBC AUTO-ENTMCNC: 33.6 G/DL (ref 31.5–35.7)
MCV RBC AUTO: 85.5 FL (ref 79–97)
MONOCYTES # BLD AUTO: 0.71 10*3/MM3 (ref 0.1–0.9)
MONOCYTES NFR BLD AUTO: 8 % (ref 5–12)
NEUTROPHILS NFR BLD AUTO: 4.87 10*3/MM3 (ref 1.7–7)
NEUTROPHILS NFR BLD AUTO: 55.2 % (ref 42.7–76)
NRBC BLD AUTO-RTO: 0.5 /100 WBC (ref 0–0.2)
PLATELET # BLD AUTO: 293 10*3/MM3 (ref 140–450)
PMV BLD AUTO: 11.3 FL (ref 6–12)
POTASSIUM SERPL-SCNC: 3.5 MMOL/L (ref 3.5–5.2)
PROT SERPL-MCNC: 7.6 G/DL (ref 6–8.5)
RBC # BLD AUTO: 4.28 10*6/MM3 (ref 3.77–5.28)
SODIUM SERPL-SCNC: 138 MMOL/L (ref 136–145)
TIBC SERPL-MCNC: 383 MCG/DL (ref 298–536)
TRANSFERRIN SERPL-MCNC: 257 MG/DL (ref 200–360)
VIT B12 BLD-MCNC: 837 PG/ML (ref 211–946)
WBC NRBC COR # BLD AUTO: 8.82 10*3/MM3 (ref 3.4–10.8)

## 2024-05-21 PROCEDURE — 36415 COLL VENOUS BLD VENIPUNCTURE: CPT

## 2024-05-21 PROCEDURE — 82728 ASSAY OF FERRITIN: CPT | Performed by: INTERNAL MEDICINE

## 2024-05-21 PROCEDURE — 99205 OFFICE O/P NEW HI 60 MIN: CPT | Performed by: INTERNAL MEDICINE

## 2024-05-21 PROCEDURE — 85025 COMPLETE CBC W/AUTO DIFF WBC: CPT

## 2024-05-21 PROCEDURE — 82607 VITAMIN B-12: CPT | Performed by: INTERNAL MEDICINE

## 2024-05-21 PROCEDURE — 80053 COMPREHEN METABOLIC PANEL: CPT | Performed by: INTERNAL MEDICINE

## 2024-05-21 PROCEDURE — 82746 ASSAY OF FOLIC ACID SERUM: CPT | Performed by: INTERNAL MEDICINE

## 2024-05-21 PROCEDURE — 84466 ASSAY OF TRANSFERRIN: CPT | Performed by: INTERNAL MEDICINE

## 2024-05-21 PROCEDURE — 83540 ASSAY OF IRON: CPT | Performed by: INTERNAL MEDICINE

## 2024-05-21 NOTE — LETTER
May 21, 2024       No Recipients    Patient: Henny James   YOB: 1960   Date of Visit: 5/21/2024     Dear Mary Haddad PA-C:       Thank you for referring Henny James to me for evaluation. Below are the relevant portions of my assessment and plan of care.    If you have questions, please do not hesitate to call me. I look forward to following Henny along with you.         Sincerely,        Gage Garcias MD        CC:   No Recipients    Gage Garcias Jr., MD  05/21/24 1652  Sign when Signing Visit  Chief Complaint  History of gastric GIST, thrombocytosis secondary to prior splenectomy, normocytic anemia    Subjective   Patient was referred by Mary Haddad PA-C, for hematology/medical oncology consultation regarding the above issues    History of Present Illness    The patient is a 63-year-old female here today for hematology/medical oncology consultation regarding history of gastric GIST, thrombocytosis secondary to prior splenectomy, normocytic anemia.    The patient has past medical history significant for hypertension, bipolar disorder, coronary artery disease (history of prior MI), paroxysmal atrial fibrillation, hyperlipidemia, TIA x 2 (following use of crack cocaine in 2006), pulmonary embolism in 2011.    In regards to her gastric GIST, she was diagnosed in 2011 after experiencing GI bleed on anticoagulation for pulmonary embolism.  She underwent surgical resection with partial gastrectomy and splenectomy in October 2011.  Pathology result not currently available however per records she had a 7 cm tumor.  She was seen by Dr. Potts in the [facility] system and received adjuvant imatinib from November 2011 until October 2012.  Patient opted to discontinue imatinib as she did not like continuing on the medication.  She was lost to follow-up after 2013.  CT scan abdomen and pelvis in the [facility] system on 2/6/2013 noted splenules in the left upper quadrant, prominence of the gastric fundus.    The  patient had pulmonary embolism in 2011 which ultimately led to diagnosis of her GIST as she experienced GI bleed on anticoagulation.  She will receive Coumadin anticoagulation which she eventually discontinued on her own, did not like continuing on INR monitoring and opted to transition to aspirin 81 mg x 2 daily.  She has not experienced any recurrent thrombosis.    Patient reports history of coronary artery disease, has had a remote MI.  She has had paroxysmal atrial fibrillation but none noted in many years.  She does report a history of TIA x 2 in 2006 which she feels was related to crack cocaine use at the time.  As above opted not to continue on Coumadin but to continue aspirin 81 mg x 2 daily.  She continues routine follow-up with cardiology.    Patient has a history of bipolar disorder, continues regular follow-up with her psychiatrist Dr. Bowles every 3 months.  She reports stable symptoms currently on Abilify, Lamictal, and Xanax as needed.    Patient was evaluated by hematology in the [facility] due to thrombocytosis in 2015.  Peripheral blood JAK2 V617F mutation was negative on 7/24/2015.  Thrombocytosis attributed to patient's postsplenectomy status.    The patient reports wide fluctuations in her weight related to her bipolar disorder.  She did lose a significant amount of weight and late 2023.  She underwent CT chest abdomen pelvis on 1/23/2024 which noted left upper quadrant nodules in the bed of splenic resection up to 2.5 x 2.6 cm likely splenule's.  There was notation of some prominent mediastinal lymph nodes up to 1 cm.  Patient has regained the weight she had lost previously.    Patient had labs performed with her PCP 12/13/2023 which showed WBC 5.64, hemoglobin 11.6, MCV 84.6, platelet count 480,000, differential 44 segs, 46 lymphs.  Anemia evaluation with iron 40, ferritin 165, iron saturation 12%, TIBC 338, B12 1935, folate greater than 20.  With the elevated B12 level, patient was asked  "to discontinue oral B12 supplementation.    Patient with borderline diabetes, hemoglobin A1c on 5/7/2024 was 6.1.  There has been discussion regarding potential use of Ozempic.    Patient today has no complaints.  She reports normal appetite.  She reports that her bipolar disorder currently is well-controlled on her current regimen.  She denies any recent suicidal ideations although this had been a problem in the distant past for her.  She denies any GI symptoms, reports normal bowel function.          Objective  Vital Signs:   /84   Pulse 59   Temp 97.8 °F (36.6 °C) (Oral)   Resp 16   Ht 165.1 cm (65\")   Wt 105 kg (231 lb 3.2 oz)   SpO2 98%   BMI 38.47 kg/m²     Physical Exam  Constitutional:       Appearance: She is well-developed.   Eyes:      Conjunctiva/sclera: Conjunctivae normal.   Neck:      Thyroid: No thyromegaly.   Cardiovascular:      Rate and Rhythm: Normal rate and regular rhythm.      Heart sounds: No murmur heard.     No friction rub. No gallop.   Pulmonary:      Effort: No respiratory distress.      Breath sounds: Normal breath sounds.   Abdominal:      General: Bowel sounds are normal. There is no distension.      Palpations: Abdomen is soft.      Tenderness: There is no abdominal tenderness.   Lymphadenopathy:      Head:      Right side of head: No submandibular adenopathy.      Cervical: No cervical adenopathy.      Upper Body:      Right upper body: No supraclavicular adenopathy.      Left upper body: No supraclavicular adenopathy.   Skin:     General: Skin is warm and dry.      Findings: No rash.   Neurological:      Mental Status: She is alert and oriented to person, place, and time.      Cranial Nerves: No cranial nerve deficit.      Motor: No abnormal muscle tone.      Deep Tendon Reflexes: Reflexes normal.   Psychiatric:         Behavior: Behavior normal.        Result Review: Reviewed CBC from today.  Reviewed multiple previous progress notes, CT scan results, laboratory " studies as outlined above and below       Assessment and Plan     *History of gastric GIST in 2011  Patient was diagnosed with gastric GIST in 2011 after experiencing GI bleed on anticoagulation for pulmonary embolism.    She underwent surgical resection with partial gastrectomy and splenectomy in October 2011.  Pathology result not currently available however per records she had a 7 cm tumor.    She was seen by Dr. Potts in the [facility] system and received adjuvant imatinib from November 2011 until October 2012.    Patient opted to discontinue imatinib as she did not like continuing on the medication.    She was lost to follow-up after 2013.    CT scan abdomen and pelvis in the [place] system on 2/6/2013 noted splenules in the left upper quadrant, prominence of the gastric fundus.  CT chest abdomen pelvis 1/23/2024 showed prominent anterior mediastinal lymph nodes 1 cm in diameter of unclear significance.  There was notation of nodules in the splenic resection bed up to 2.5 x 2.6 cm felt to likely be splenules although could not rule out metastatic/recurrent disease.  Patient is here today in initial medical oncology consultation.  She has not pursued any routine ongoing medical care after stopping imatinib in 2012 which she was receiving adjuvantly.  She reports that she did not want to take the medication any longer, was not having specific side effects by her report.  Fortunately it appears that she has not experienced any evidence to suggest recurrent disease.  We discussed her most recent CT scan from 1/23/2024.  The findings in the abdomen most likely represents splenule's after her splenectomy and the mediastinal lymph nodes are only 1 cm in size and likely are insignificant.  We did however discuss obtaining a follow-up CT chest abdomen pelvis 6 months out from this scan which we will schedule prior to her return visit here in approximately 3 months.  At that point, if the scans are unrevealing we will  discuss possibly transitioning to annual clinical follow-up thereafter.    *Normocytic anemia  Patient is status post partial gastrectomy in 2011.  Patient with hemoglobin that has been in the 11-12 range with normal MCV  Most recent anemia evaluation on 12/13/2023 with iron 40, ferritin 165, iron saturation 12%, TIBC 338, B12 1935, folate greater than 20  Patient reports that oral B12 supplementation was discontinued in December 2023 due to elevated level.  Hemoglobin today improved to 12.3.  We will recheck iron panel, ferritin, B12 level today.  Given the patient's partial gastrectomy, we will need to continue monitoring for B12 deficiency off of supplementation.    *Intermittent thrombocytosis secondary to postsplenectomy state  Patient underwent splenectomy at the time of her partial gastrectomy for her GIST tumor in 2011  Patient has had intermittent thrombocytosis since splenectomy  Patient was evaluated by hematology in the [place] system due to thrombocytosis in 2015.  Peripheral blood JAK2 V617F mutation was negative on 7/24/2015.  Thrombocytosis attributed to patient's postsplenectomy status.  Platelet count recently was elevated on 12/13/2023 of 480,000   Today, platelet count is normal at 293,000.    *History of pulmonary embolism in 2011  The patient had pulmonary embolism in 2011 which ultimately led to diagnosis of her GIST as she experienced GI bleed on anticoagulation.    She received Coumadin anticoagulation which she eventually discontinued on her own around 2012, did not like continuing on INR monitoring and opted to transition to aspirin 81 mg x 2 daily.    She has not experienced any recurrent thrombosis.    *Coronary artery disease, paroxysmal atrial fibrillation, history of TIA x 2 (2006)  Patient reports history of coronary artery disease, has had a remote MI.    She has had paroxysmal atrial fibrillation but none noted in many years.    She does report a history of TIA x 2 in 2006 which she  feels was related to crack cocaine use at the time.    As above opted not to continue on Coumadin but to continue aspirin 81 mg x 2 daily.    She continues routine follow-up with cardiology.    *Bipolar disorder  Patient has a history of bipolar disorder, continues regular follow-up with her psychiatrist Dr. Bowles every 3 months.    She reports stable symptoms currently on Abilify, Lamictal, and Xanax as needed.    *Borderline diabetes  Patient with borderline diabetes, most recent hemoglobin A1c on 5/7/2024 was 6.1  There was consideration regarding potential use of Ozempic.  There does not appear to be any indication to avoid this in the setting of history of gastric GIST.  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  GIST is not felt to be commonly associated with MEN2. There is concern however regarding more recent caution with Ozempic and potential suicidal ideation in the setting of her bipolar disorder.    *Hypertension  Patient continues on azilsartan/chlorthalidone, amlodipine, carvedilol, hydralazine    *Status post splenectomy  Patient underwent splenectomy at the time of her partial gastrectomy for her GIST tumor in 2011  Unclear whether patient received pre or postsplenectomy vaccinations and we will try to obtain records.      Plan:  Additional labs today with CMP, iron panel, ferritin, B12 and we will notify patient of any significant abnormal findings.  We will try to obtain records from 2011 in regards to operative report and pathology as well as molecular studies regarding patient's GIST tumor and to see if she received any pre or postsplenectomy vaccinations  There does not appear to be any clinical or radiographic evidence of recurrent GIST  There does not appear to be any specific contraindication from the standpoint of GIST in regards to use of Ozempic  Concern regarding potential risk for suicidal ideation with  Ozempic (updated alert/warning) in setting of bipolar disorder  Return visit in 3 months with CBC, CMP, stat iron panel/ferritin, B12 level.  1 week prior CT chest abdomen pelvis.    I did spend 60 in total time caring for the patient today, time spent in review of records, face-to-face consultation, coordination of care, placement of orders, completion of documentation.

## 2024-05-21 NOTE — PROGRESS NOTES
Chief Complaint  History of gastric GIST, thrombocytosis secondary to prior splenectomy, normocytic anemia    Subjective    Patient was referred by Mary Haddad PA-C, for hematology/medical oncology consultation regarding the above issues    History of Present Illness    The patient is a 63-year-old female here today for hematology/medical oncology consultation regarding history of gastric GIST, thrombocytosis secondary to prior splenectomy, normocytic anemia.    The patient has past medical history significant for hypertension, bipolar disorder, coronary artery disease (history of prior MI), paroxysmal atrial fibrillation, hyperlipidemia, TIA x 2 (following use of crack cocaine in 2006), pulmonary embolism in 2011.    In regards to her gastric GIST, she was diagnosed in 2011 after experiencing GI bleed on anticoagulation for pulmonary embolism.  She underwent surgical resection with partial gastrectomy and splenectomy in October 2011.  Pathology result not currently available however per records she had a 7 cm tumor.  She was seen by Dr. Potts in the [facility] system and received adjuvant imatinib from November 2011 until October 2012.  Patient opted to discontinue imatinib as she did not like continuing on the medication.  She was lost to follow-up after 2013.  CT scan abdomen and pelvis in the [facility] system on 2/6/2013 noted splenules in the left upper quadrant, prominence of the gastric fundus.    The patient had pulmonary embolism in 2011 which ultimately led to diagnosis of her GIST as she experienced GI bleed on anticoagulation.  She will receive Coumadin anticoagulation which she eventually discontinued on her own, did not like continuing on INR monitoring and opted to transition to aspirin 81 mg x 2 daily.  She has not experienced any recurrent thrombosis.    Patient reports history of coronary artery disease, has had a remote MI.  She has had paroxysmal atrial fibrillation but none noted in many years.  She  does report a history of TIA x 2 in 2006 which she feels was related to crack cocaine use at the time.  As above opted not to continue on Coumadin but to continue aspirin 81 mg x 2 daily.  She continues routine follow-up with cardiology.    Patient has a history of bipolar disorder, continues regular follow-up with her psychiatrist Dr. Bowles every 3 months.  She reports stable symptoms currently on Abilify, Lamictal, and Xanax as needed.    Patient was evaluated by hematology in the [facility] due to thrombocytosis in 2015.  Peripheral blood JAK2 V617F mutation was negative on 7/24/2015.  Thrombocytosis attributed to patient's postsplenectomy status.    The patient reports wide fluctuations in her weight related to her bipolar disorder.  She did lose a significant amount of weight and late 2023.  She underwent CT chest abdomen pelvis on 1/23/2024 which noted left upper quadrant nodules in the bed of splenic resection up to 2.5 x 2.6 cm likely splenule's.  There was notation of some prominent mediastinal lymph nodes up to 1 cm.  Patient has regained the weight she had lost previously.    Patient had labs performed with her PCP 12/13/2023 which showed WBC 5.64, hemoglobin 11.6, MCV 84.6, platelet count 480,000, differential 44 segs, 46 lymphs.  Anemia evaluation with iron 40, ferritin 165, iron saturation 12%, TIBC 338, B12 1935, folate greater than 20.  With the elevated B12 level, patient was asked to discontinue oral B12 supplementation.    Patient with borderline diabetes, hemoglobin A1c on 5/7/2024 was 6.1.  There has been discussion regarding potential use of Ozempic.    Patient today has no complaints.  She reports normal appetite.  She reports that her bipolar disorder currently is well-controlled on her current regimen.  She denies any recent suicidal ideations although this had been a problem in the distant past for her.  She denies any GI symptoms, reports normal bowel function.          Objective  "  Vital Signs:   /84   Pulse 59   Temp 97.8 °F (36.6 °C) (Oral)   Resp 16   Ht 165.1 cm (65\")   Wt 105 kg (231 lb 3.2 oz)   SpO2 98%   BMI 38.47 kg/m²     Physical Exam  Constitutional:       Appearance: She is well-developed.   Eyes:      Conjunctiva/sclera: Conjunctivae normal.   Neck:      Thyroid: No thyromegaly.   Cardiovascular:      Rate and Rhythm: Normal rate and regular rhythm.      Heart sounds: No murmur heard.     No friction rub. No gallop.   Pulmonary:      Effort: No respiratory distress.      Breath sounds: Normal breath sounds.   Abdominal:      General: Bowel sounds are normal. There is no distension.      Palpations: Abdomen is soft.      Tenderness: There is no abdominal tenderness.   Lymphadenopathy:      Head:      Right side of head: No submandibular adenopathy.      Cervical: No cervical adenopathy.      Upper Body:      Right upper body: No supraclavicular adenopathy.      Left upper body: No supraclavicular adenopathy.   Skin:     General: Skin is warm and dry.      Findings: No rash.   Neurological:      Mental Status: She is alert and oriented to person, place, and time.      Cranial Nerves: No cranial nerve deficit.      Motor: No abnormal muscle tone.      Deep Tendon Reflexes: Reflexes normal.   Psychiatric:         Behavior: Behavior normal.        Result Review : Reviewed CBC from today.  Reviewed multiple previous progress notes, CT scan results, laboratory studies as outlined above and below       Assessment and Plan     *History of gastric GIST in 2011  Patient was diagnosed with gastric GIST in 2011 after experiencing GI bleed on anticoagulation for pulmonary embolism.    She underwent surgical resection with partial gastrectomy and splenectomy in October 2011.  Pathology result not currently available however per records she had a 7 cm tumor.    She was seen by Dr. Potts in the [facility] system and received adjuvant imatinib from November 2011 until October 2012.  "   Patient opted to discontinue imatinib as she did not like continuing on the medication.    She was lost to follow-up after 2013.    CT scan abdomen and pelvis in the [facility] system on 2/6/2013 noted splenules in the left upper quadrant, prominence of the gastric fundus.  CT chest abdomen pelvis 1/23/2024 showed prominent anterior mediastinal lymph nodes 1 cm in diameter of unclear significance.  There was notation of nodules in the splenic resection bed up to 2.5 x 2.6 cm felt to likely be splenules although could not rule out metastatic/recurrent disease.  Patient is here today in initial medical oncology consultation.  She has not pursued any routine ongoing medical care after stopping imatinib in 2012 which she was receiving adjuvantly.  She reports that she did not want to take the medication any longer, was not having specific side effects by her report.  Fortunately it appears that she has not experienced any evidence to suggest recurrent disease.  We discussed her most recent CT scan from 1/23/2024.  The findings in the abdomen most likely represents splenule's after her splenectomy and the mediastinal lymph nodes are only 1 cm in size and likely are insignificant.  We did however discuss obtaining a follow-up CT chest abdomen pelvis 6 months out from this scan which we will schedule prior to her return visit here in approximately 3 months.  At that point, if the scans are unrevealing we will discuss possibly transitioning to annual clinical follow-up thereafter.    *Normocytic anemia  Patient is status post partial gastrectomy in 2011.  Patient with hemoglobin that has been in the 11-12 range with normal MCV  Most recent anemia evaluation on 12/13/2023 with iron 40, ferritin 165, iron saturation 12%, TIBC 338, B12 1935, folate greater than 20  Patient reports that oral B12 supplementation was discontinued in December 2023 due to elevated level.  Hemoglobin today improved to 12.3.  We will recheck iron  panel, ferritin, B12 level today.  Given the patient's partial gastrectomy, we will need to continue monitoring for B12 deficiency off of supplementation.    *Intermittent thrombocytosis secondary to postsplenectomy state  Patient underwent splenectomy at the time of her partial gastrectomy for her GIST tumor in 2011  Patient has had intermittent thrombocytosis since splenectomy  Patient was evaluated by hematology in the [facility] system due to thrombocytosis in 2015.  Peripheral blood JAK2 V617F mutation was negative on 7/24/2015.  Thrombocytosis attributed to patient's postsplenectomy status.  Platelet count recently was elevated on 12/13/2023 of 480,000   Today, platelet count is normal at 293,000.    *History of pulmonary embolism in 2011  The patient had pulmonary embolism in 2011 which ultimately led to diagnosis of her GIST as she experienced GI bleed on anticoagulation.    She received Coumadin anticoagulation which she eventually discontinued on her own around 2012, did not like continuing on INR monitoring and opted to transition to aspirin 81 mg x 2 daily.    She has not experienced any recurrent thrombosis.    *Coronary artery disease, paroxysmal atrial fibrillation, history of TIA x 2 (2006)  Patient reports history of coronary artery disease, has had a remote MI.    She has had paroxysmal atrial fibrillation but none noted in many years.    She does report a history of TIA x 2 in 2006 which she feels was related to crack cocaine use at the time.    As above opted not to continue on Coumadin but to continue aspirin 81 mg x 2 daily.    She continues routine follow-up with cardiology.    *Bipolar disorder  Patient has a history of bipolar disorder, continues regular follow-up with her psychiatrist Dr. Bowles every 3 months.    She reports stable symptoms currently on Abilify, Lamictal, and Xanax as needed.    *Borderline diabetes  Patient with borderline diabetes, most recent hemoglobin A1c on 5/7/2024 was  6.1  There was consideration regarding potential use of Ozempic.  There does not appear to be any indication to avoid this in the setting of history of gastric GIST.  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  GIST is not felt to be commonly associated with MEN2. There is concern however regarding more recent caution with Ozempic and potential suicidal ideation in the setting of her bipolar disorder.    *Hypertension  Patient continues on azilsartan/chlorthalidone, amlodipine, carvedilol, hydralazine    *Status post splenectomy  Patient underwent splenectomy at the time of her partial gastrectomy for her GIST tumor in 2011  Unclear whether patient received pre or postsplenectomy vaccinations and we will try to obtain records.      Plan:  Additional labs today with CMP, iron panel, ferritin, B12 and we will notify patient of any significant abnormal findings.  We will try to obtain records from 2011 in regards to operative report and pathology as well as molecular studies regarding patient's GIST tumor and to see if she received any pre or postsplenectomy vaccinations  There does not appear to be any clinical or radiographic evidence of recurrent GIST  There does not appear to be any specific contraindication from the standpoint of GIST in regards to use of Ozempic  Concern regarding potential risk for suicidal ideation with Ozempic (updated alert/warning) in setting of bipolar disorder  Return visit in 3 months with CBC, CMP, stat iron panel/ferritin, B12 level.  1 week prior CT chest abdomen pelvis.    I did spend 60 in total time caring for the patient today, time spent in review of records, face-to-face consultation, coordination of care, placement of orders, completion of documentation.

## 2024-06-07 LAB
25(OH)D3+25(OH)D2 SERPL-MCNC: 19.9 NG/ML (ref 30–100)
ALBUMIN SERPL-MCNC: 4.3 G/DL (ref 3.5–5.2)
ALBUMIN/GLOB SERPL: 1.4 G/DL
ALP SERPL-CCNC: 105 U/L (ref 39–117)
ALT SERPL-CCNC: 20 U/L (ref 1–33)
APPEARANCE UR: CLEAR
AST SERPL-CCNC: 22 U/L (ref 1–32)
BACTERIA #/AREA URNS HPF: ABNORMAL /HPF
BASOPHILS # BLD AUTO: 0.06 10*3/MM3 (ref 0–0.2)
BASOPHILS NFR BLD AUTO: 1 % (ref 0–1.5)
BILIRUB SERPL-MCNC: 0.2 MG/DL (ref 0–1.2)
BILIRUB UR QL STRIP: NEGATIVE
BUN SERPL-MCNC: 12 MG/DL (ref 8–23)
BUN/CREAT SERPL: 21.4 (ref 7–25)
CALCIUM SERPL-MCNC: 9.3 MG/DL (ref 8.6–10.5)
CASTS URNS MICRO: ABNORMAL
CHLORIDE SERPL-SCNC: 100 MMOL/L (ref 98–107)
CHOLEST SERPL-MCNC: 234 MG/DL (ref 0–200)
CO2 SERPL-SCNC: 24.6 MMOL/L (ref 22–29)
COLOR UR: ABNORMAL
CREAT SERPL-MCNC: 0.56 MG/DL (ref 0.57–1)
EGFRCR SERPLBLD CKD-EPI 2021: 104 ML/MIN/1.73
EOSINOPHIL # BLD AUTO: 0.07 10*3/MM3 (ref 0–0.4)
EOSINOPHIL NFR BLD AUTO: 1.2 % (ref 0.3–6.2)
EPI CELLS #/AREA URNS HPF: ABNORMAL /HPF
ERYTHROCYTE [DISTWIDTH] IN BLOOD BY AUTOMATED COUNT: 14.8 % (ref 12.3–15.4)
FOLATE SERPL-MCNC: >20 NG/ML (ref 4.78–24.2)
GLOBULIN SER CALC-MCNC: 3 GM/DL
GLUCOSE SERPL-MCNC: 83 MG/DL (ref 65–99)
GLUCOSE UR QL STRIP: NEGATIVE
HBA1C MFR BLD: 5.9 % (ref 4.8–5.6)
HCT VFR BLD AUTO: 38.4 % (ref 34–46.6)
HDLC SERPL-MCNC: 83 MG/DL (ref 40–60)
HGB BLD-MCNC: 12.5 G/DL (ref 12–15.9)
HGB UR QL STRIP: NEGATIVE
IMM GRANULOCYTES # BLD AUTO: 0.02 10*3/MM3 (ref 0–0.05)
IMM GRANULOCYTES NFR BLD AUTO: 0.3 % (ref 0–0.5)
KETONES UR QL STRIP: NEGATIVE
LDLC SERPL CALC-MCNC: 135 MG/DL (ref 0–100)
LEUKOCYTE ESTERASE UR QL STRIP: ABNORMAL
LYMPHOCYTES # BLD AUTO: 2.36 10*3/MM3 (ref 0.7–3.1)
LYMPHOCYTES NFR BLD AUTO: 39.9 % (ref 19.6–45.3)
MCH RBC QN AUTO: 28.3 PG (ref 26.6–33)
MCHC RBC AUTO-ENTMCNC: 32.6 G/DL (ref 31.5–35.7)
MCV RBC AUTO: 86.9 FL (ref 79–97)
MONOCYTES # BLD AUTO: 0.46 10*3/MM3 (ref 0.1–0.9)
MONOCYTES NFR BLD AUTO: 7.8 % (ref 5–12)
NEUTROPHILS # BLD AUTO: 2.95 10*3/MM3 (ref 1.7–7)
NEUTROPHILS NFR BLD AUTO: 49.8 % (ref 42.7–76)
NITRITE UR QL STRIP: NEGATIVE
NRBC BLD AUTO-RTO: 0 /100 WBC (ref 0–0.2)
PH UR STRIP: 6.5 [PH] (ref 5–8)
PLATELET # BLD AUTO: 398 10*3/MM3 (ref 140–450)
POTASSIUM SERPL-SCNC: 4.3 MMOL/L (ref 3.5–5.2)
PROT SERPL-MCNC: 7.3 G/DL (ref 6–8.5)
PROT UR QL STRIP: ABNORMAL
RBC # BLD AUTO: 4.42 10*6/MM3 (ref 3.77–5.28)
RBC #/AREA URNS HPF: ABNORMAL /HPF
SODIUM SERPL-SCNC: 138 MMOL/L (ref 136–145)
SP GR UR STRIP: 1.02 (ref 1–1.03)
T3FREE SERPL-MCNC: 2.8 PG/ML (ref 2–4.4)
T4 FREE SERPL-MCNC: 1.1 NG/DL (ref 0.92–1.68)
TRIGL SERPL-MCNC: 94 MG/DL (ref 0–150)
TSH SERPL DL<=0.005 MIU/L-ACNC: 1.51 UIU/ML (ref 0.27–4.2)
UROBILINOGEN UR STRIP-MCNC: ABNORMAL MG/DL
VIT B12 SERPL-MCNC: 1074 PG/ML (ref 211–946)
VLDLC SERPL CALC-MCNC: 16 MG/DL (ref 5–40)
WBC # BLD AUTO: 5.92 10*3/MM3 (ref 3.4–10.8)
WBC #/AREA URNS HPF: ABNORMAL /HPF

## 2024-06-12 ENCOUNTER — OFFICE VISIT (OUTPATIENT)
Dept: FAMILY MEDICINE CLINIC | Facility: CLINIC | Age: 64
End: 2024-06-12
Payer: OTHER GOVERNMENT

## 2024-06-12 VITALS
BODY MASS INDEX: 37.99 KG/M2 | SYSTOLIC BLOOD PRESSURE: 120 MMHG | TEMPERATURE: 97.9 F | DIASTOLIC BLOOD PRESSURE: 70 MMHG | RESPIRATION RATE: 16 BRPM | OXYGEN SATURATION: 98 % | HEART RATE: 59 BPM | HEIGHT: 65 IN | WEIGHT: 228 LBS

## 2024-06-12 DIAGNOSIS — I10 PRIMARY HYPERTENSION: Primary | ICD-10-CM

## 2024-06-12 DIAGNOSIS — C49.A2 MALIGNANT GASTROINTESTINAL STROMAL TUMOR (GIST) OF STOMACH: ICD-10-CM

## 2024-06-12 DIAGNOSIS — E78.2 HYPERLIPIDEMIA, MIXED: ICD-10-CM

## 2024-06-12 DIAGNOSIS — Z90.81 S/P SPLENECTOMY: ICD-10-CM

## 2024-06-12 DIAGNOSIS — F31.9 BIPOLAR 1 DISORDER: ICD-10-CM

## 2024-06-12 DIAGNOSIS — R73.01 IMPAIRED FASTING GLUCOSE: ICD-10-CM

## 2024-06-12 PROCEDURE — 99213 OFFICE O/P EST LOW 20 MIN: CPT | Performed by: PHYSICIAN ASSISTANT

## 2024-06-12 RX ORDER — METFORMIN HYDROCHLORIDE 500 MG/1
TABLET, EXTENDED RELEASE ORAL
Qty: 180 TABLET | Refills: 1 | Status: SHIPPED | OUTPATIENT
Start: 2024-06-12

## 2024-06-12 NOTE — PROGRESS NOTES
"Subjective   Henny James is a 63 y.o. female.     Weight Loss  Pertinent negatives include no diaphoresis.       Since the last visit, she has overall felt tired.  She has Primary Hypertension and well controlled on current medication, Impaired fasting glucose and will monitor labs to watch for DMII, and Hyperlipidemia and working on this with diet and exercise.  she has been compliant with current medications have reviewed them.  The patient denies medication side effects.  Will refill medications. /60   Pulse 59   Temp 97.9 °F (36.6 °C)   Resp 16   Ht 165.1 cm (65\")   Wt 103 kg (228 lb)   SpO2 98%   BMI 37.94 kg/m² .  Class 2 Severe Obesity (BMI >=35 and <=39.9). Obesity-related health conditions include the following: hypertension and dyslipidemias. Obesity is unchanged. BMI is is above average; BMI management plan is completed. We discussed low calorie, low carb based diet program, portion control, and increasing exercise.   Declines statin  Metformin did improve her hemoglobin A1c but made her hungry.  She is interested in preventing diabetes and wants to restart the metformin  Dr Bowles-----is psych  Last saw cardio--Dr Rodriguez 2-14-24 CAD ----HTN----  Hypertension patient been very difficult to control here in the recent year or 2 she is doing very well with this on a good medical regimen.  Will continue the same.  History of stroke.  This is believed to be secondary to hypertensive crisis/emergency.  Also paroxysmal atrial fibrillation in years past.  Follow-up yearly  Neg for sleep apnea  I do want a note that patient has tried atorvastatin and Crestor and was intolerant due to myalgia and declines trying another statin at this time  Still needs to have EGD and colonoscopy due to history of GIST tumor and has been seen by Dr. Wheeler  Had her stop taking metformin it made her hungry and her A1c increased on this lab my neck step would be consideration of bariatric surgery and refer her to " Dr. Moulton---she wants to wait  Stop Metformin---makes her hungry and A1C went up on labs  Results for orders placed or performed in visit on 06/06/24   Comprehensive Metabolic Panel   Result Value Ref Range    Glucose 83 65 - 99 mg/dL    BUN 12 8 - 23 mg/dL    Creatinine 0.56 (L) 0.57 - 1.00 mg/dL    EGFR Result 104.0 >60.0 mL/min/1.73    BUN/Creatinine Ratio 21.4 7.0 - 25.0    Sodium 138 136 - 145 mmol/L    Potassium 4.3 3.5 - 5.2 mmol/L    Chloride 100 98 - 107 mmol/L    Total CO2 24.6 22.0 - 29.0 mmol/L    Calcium 9.3 8.6 - 10.5 mg/dL    Total Protein 7.3 6.0 - 8.5 g/dL    Albumin 4.3 3.5 - 5.2 g/dL    Globulin 3.0 gm/dL    A/G Ratio 1.4 g/dL    Total Bilirubin 0.2 0.0 - 1.2 mg/dL    Alkaline Phosphatase 105 39 - 117 U/L    AST (SGOT) 22 1 - 32 U/L    ALT (SGPT) 20 1 - 33 U/L   Microscopic Examination -   Result Value Ref Range    WBC, UA 3-5 (A) /HPF    RBC, UA 0-2 /HPF    Epithelial Cells (non renal) 13-20 (A) /HPF    Cast Type Comment     Bacteria, UA 2+ (A) None Seen /HPF   Lipid Panel   Result Value Ref Range    Total Cholesterol 234 (H) 0 - 200 mg/dL    Triglycerides 94 0 - 150 mg/dL    HDL Cholesterol 83 (H) 40 - 60 mg/dL    VLDL Cholesterol Jermain 16 5 - 40 mg/dL    LDL Chol Calc (NIH) 135 (H) 0 - 100 mg/dL   Hemoglobin A1c   Result Value Ref Range    Hemoglobin A1C 5.90 (H) 4.80 - 5.60 %   T4, Free   Result Value Ref Range    Free T4 1.10 0.92 - 1.68 ng/dL   Folate   Result Value Ref Range    Folate >20.00 4.78 - 24.20 ng/mL   TSH   Result Value Ref Range    TSH 1.510 0.270 - 4.200 uIU/mL   Vitamin D,25-Hydroxy   Result Value Ref Range    25 Hydroxy, Vitamin D 19.9 (L) 30.0 - 100.0 ng/ml   Vitamin B12   Result Value Ref Range    Vitamin B-12 1,074 (H) 211 - 946 pg/mL   T3, Free   Result Value Ref Range    T3, Free 2.8 2.0 - 4.4 pg/mL   CBC & Differential   Result Value Ref Range    WBC 5.92 3.40 - 10.80 10*3/mm3    RBC 4.42 3.77 - 5.28 10*6/mm3    Hemoglobin 12.5 12.0 - 15.9 g/dL    Hematocrit 38.4 34.0 -  46.6 %    MCV 86.9 79.0 - 97.0 fL    MCH 28.3 26.6 - 33.0 pg    MCHC 32.6 31.5 - 35.7 g/dL    RDW 14.8 12.3 - 15.4 %    Platelets 398 140 - 450 10*3/mm3    Neutrophil Rel % 49.8 42.7 - 76.0 %    Lymphocyte Rel % 39.9 19.6 - 45.3 %    Monocyte Rel % 7.8 5.0 - 12.0 %    Eosinophil Rel % 1.2 0.3 - 6.2 %    Basophil Rel % 1.0 0.0 - 1.5 %    Neutrophils Absolute 2.95 1.70 - 7.00 10*3/mm3    Lymphocytes Absolute 2.36 0.70 - 3.10 10*3/mm3    Monocytes Absolute 0.46 0.10 - 0.90 10*3/mm3    Eosinophils Absolute 0.07 0.00 - 0.40 10*3/mm3    Basophils Absolute 0.06 0.00 - 0.20 10*3/mm3    Immature Granulocyte Rel % 0.3 0.0 - 0.5 %    Immature Grans Absolute 0.02 0.00 - 0.05 10*3/mm3    nRBC 0.0 0.0 - 0.2 /100 WBC   Urinalysis With Microscopic -   Result Value Ref Range    Specific Gravity, UA 1.020 1.005 - 1.030    pH, UA 6.5 5.0 - 8.0    Color, UA See below: (A)     Appearance, UA Clear Clear    Leukocytes, UA See below: (A) Negative    Protein Trace (A) Negative    Glucose, UA Negative Negative    Ketones Negative Negative    Blood, UA Negative Negative    Bilirubin, UA Negative Negative    Urobilinogen, UA Comment     Nitrite, UA Negative Negative   The ASCVD Risk score (Kayla DK, et al., 2019) failed to calculate for the following reasons:    The patient has a prior MI or stroke diagnosis        Saw ortho 6-4-24 DR Foley---sees for bilat knees Q 3 mos       *History of gastric GIST in 2011  Patient was diagnosed with gastric GIST in 2011 after experiencing GI bleed on anticoagulation for pulmonary embolism.    She underwent surgical resection with partial gastrectomy and splenectomy in October 2011.  Pathology result not currently available however per records she had a 7 cm tumor.    She was seen by Dr. Potts in the [facility] system and received adjuvant imatinib from November 2011 until October 2012.    Patient opted to discontinue imatinib as she did not like continuing on the medication.    She was lost to follow-up  after 2013.    CT scan abdomen and pelvis in the [facility] system on 2/6/2013 noted splenules in the left upper quadrant, prominence of the gastric fundus.  CT chest abdomen pelvis 1/23/2024 showed prominent anterior mediastinal lymph nodes 1 cm in diameter of unclear significance.  There was notation of nodules in the splenic resection bed up to 2.5 x 2.6 cm felt to likely be splenules although could not rule out metastatic/recurrent disease.  Patient is here today in initial medical oncology consultation.  She has not pursued any routine ongoing medical care after stopping imatinib in 2012 which she was receiving adjuvantly.  She reports that she did not want to take the medication any longer, was not having specific side effects by her report.  Fortunately it appears that she has not experienced any evidence to suggest recurrent disease.  We discussed her most recent CT scan from 1/23/2024.  The findings in the abdomen most likely represents splenule's after her splenectomy and the mediastinal lymph nodes are only 1 cm in size and likely are insignificant.  We did however discuss obtaining a follow-up CT chest abdomen pelvis 6 months out from this scan which we will schedule prior to her return visit here in approximately 3 months.  At that point, if the scans are unrevealing we will discuss possibly transitioning to annual clinical follow-up thereafter.     *Normocytic anemia  Patient is status post partial gastrectomy in 2011.  Patient with hemoglobin that has been in the 11-12 range with normal MCV  Most recent anemia evaluation on 12/13/2023 with iron 40, ferritin 165, iron saturation 12%, TIBC 338, B12 1935, folate greater than 20  Patient reports that oral B12 supplementation was discontinued in December 2023 due to elevated level.  Hemoglobin today improved to 12.3.  We will recheck iron panel, ferritin, B12 level today.  Given the patient's partial gastrectomy, we will need to continue monitoring for B12  deficiency off of supplementation.     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  Peripheral blood JAK2 V617F mutation was negative on 7/24/2015.  Thrombocytosis attributed to patient's postsplenectomy status.  Platelet count recently was elevated on 12/13/2023 of 480,000   Today, platelet count is normal at 293,000.     *History of pulmonary embolism in 2011  The patient had pulmonary embolism in 2011 which ultimately led to diagnosis of her GIST as she experienced GI bleed on anticoagulation.    She received Coumadin anticoagulation which she eventually discontinued on her own around 2012, did not like continuing on INR monitoring and opted to transition to aspirin 81 mg x 2 daily.    She has not experienced any recurrent thrombosis.     *Coronary artery disease, paroxysmal atrial fibrillation, history of TIA x 2 (2006)  Patient reports history of coronary artery disease, has had a remote MI.    She has had paroxysmal atrial fibrillation but none noted in many years.    She does report a history of TIA x 2 in 2006 which she feels was related to crack cocaine use at the time.    As above opted not to continue on Coumadin but to continue aspirin 81 mg x 2 daily.    She continues routine follow-up with cardiology.     *Bipolar disorder  Patient has a history of bipolar disorder, continues regular follow-up with her psychiatrist Dr. Bowles every 3 months.    She reports stable symptoms currently on Abilify, Lamictal, and Xanax as needed.     *Borderline diabetes  Patient with borderline diabetes, most recent hemoglobin A1c on 5/7/2024 was 6.1  There was consideration regarding potential use of Ozempic.  There does not appear to be any indication to  avoid this in the setting of history of gastric GIST.  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  GIST is not felt to be commonly associated with MEN2. There is concern however regarding more recent caution with Ozempic and potential suicidal ideation in the setting of her bipolar disorder.     *Hypertension  Patient continues on azilsartan/chlorthalidone, amlodipine, carvedilol, hydralazine     *Status post splenectomy  Patient underwent splenectomy at the time of her partial gastrectomy for her GIST tumor in 2011  Unclear whether patient received pre or postsplenectomy vaccinations and we will try to obtain records.        Plan:  Additional labs today with CMP, iron panel, ferritin, B12 and we will notify patient of any significant abnormal findings.  We will try to obtain records from 2011 in regards to operative report and pathology as well as molecular studies regarding patient's GIST tumor and to see if she received any pre or postsplenectomy vaccinations  There does not appear to be any clinical or radiographic evidence of recurrent GIST  There does not appear to be any specific contraindication from the standpoint of GIST in regards to use of Ozempic  Concern regarding potential risk for suicidal ideation with Ozempic (updated alert/warning) in setting of bipolar disorder  Return visit in 3 months with CBC, CMP, stat iron panel/ferritin, B12 level.  1 week prior CT chest abdomen pelvis.       The following portions of the patient's history were reviewed and updated as appropriate: allergies, current medications, past family history, past medical history, past social history, past surgical history, and problem list.    Review of Systems   Constitutional:  Positive for unexpected weight loss. Negative for diaphoresis.   HENT:  Negative for nosebleeds and trouble swallowing.    Eyes:  Negative for blurred vision and visual  disturbance.   Respiratory:  Negative for choking.    Gastrointestinal:  Negative for blood in stool.   Allergic/Immunologic: Negative for immunocompromised state.   Neurological:  Negative for facial asymmetry and speech difficulty.   Psychiatric/Behavioral:  Positive for sleep disturbance. Negative for self-injury and suicidal ideas.        Objective   Physical Exam  Vitals and nursing note reviewed.   Constitutional:       General: She is not in acute distress.     Appearance: She is well-developed. She is not ill-appearing or toxic-appearing.   HENT:      Head: Normocephalic.      Right Ear: External ear normal.      Left Ear: External ear normal.      Nose: Nose normal.      Mouth/Throat:      Pharynx: Oropharynx is clear.   Eyes:      General: No scleral icterus.     Conjunctiva/sclera: Conjunctivae normal.      Pupils: Pupils are equal, round, and reactive to light.   Neck:      Thyroid: No thyromegaly.   Cardiovascular:      Rate and Rhythm: Normal rate and regular rhythm.      Pulses: Normal pulses.      Heart sounds: Murmur heard.   Pulmonary:      Effort: Pulmonary effort is normal. No respiratory distress.      Breath sounds: Normal breath sounds.   Musculoskeletal:         General: No deformity. Normal range of motion.      Cervical back: Normal range of motion and neck supple.      Right lower leg: Edema present.      Left lower leg: Edema present.      Comments: Trace pedal edema = bilat     Skin:     General: Skin is warm and dry.      Findings: No rash.   Neurological:      General: No focal deficit present.      Mental Status: She is alert and oriented to person, place, and time. Mental status is at baseline.   Psychiatric:         Behavior: Behavior normal.         Thought Content: Thought content normal.         Judgment: Judgment normal.           Assessment & Plan   Diagnoses and all orders for this visit:    1. Primary hypertension (Primary)  -     Comprehensive metabolic panel; Future  -      Lipid panel; Future  -     Hemoglobin A1c; Future    2. S/P splenectomy  -     Comprehensive metabolic panel; Future  -     Lipid panel; Future  -     Hemoglobin A1c; Future    3. Impaired fasting glucose  -     Comprehensive metabolic panel; Future  -     Lipid panel; Future  -     Hemoglobin A1c; Future    4. Hyperlipidemia, mixed  -     Comprehensive metabolic panel; Future  -     Lipid panel; Future  -     Hemoglobin A1c; Future    5. Bipolar 1 disorder    6. Malignant gastrointestinal stromal tumor (GIST) of stomach    Start exercise  Dr. Garcias hematology oncologist advised that semaglutide can cause suicidal ideations and not appropriate to give any bipolar patient  Still want her to consider having the carotid Doppler screening  Okay to restart metformin stop taking if makes you hungry--this did help her hemoglobin A1c and will get labs in 6 months  Declines statin   Labs show low Vitamin D levels.  I want you to add OTC Vitamin D 5,000 I.U. Daily.

## 2024-08-14 ENCOUNTER — HOSPITAL ENCOUNTER (OUTPATIENT)
Dept: CT IMAGING | Facility: HOSPITAL | Age: 64
Discharge: HOME | End: 2024-08-14
Admitting: INTERNAL MEDICINE
Payer: OTHER GOVERNMENT

## 2024-08-14 DIAGNOSIS — C49.A2 MALIGNANT GASTROINTESTINAL STROMAL TUMOR (GIST) OF STOMACH: ICD-10-CM

## 2024-08-14 DIAGNOSIS — D64.9 NORMOCYTIC ANEMIA: ICD-10-CM

## 2024-08-14 PROCEDURE — 25510000001 IOPAMIDOL 61 % SOLUTION: Performed by: INTERNAL MEDICINE

## 2024-08-14 PROCEDURE — 74177 CT ABD & PELVIS W/CONTRAST: CPT

## 2024-08-14 PROCEDURE — 71260 CT THORAX DX C+: CPT

## 2024-08-14 RX ADMIN — IOPAMIDOL 85 ML: 612 INJECTION, SOLUTION INTRAVENOUS at 12:15

## 2024-08-20 RX ORDER — CARVEDILOL 25 MG/1
25 TABLET ORAL 2 TIMES DAILY
Qty: 180 TABLET | Refills: 3 | Status: SHIPPED | OUTPATIENT
Start: 2024-08-20

## 2024-08-20 NOTE — PROGRESS NOTES
"Chief Complaint  History of gastric GIST, thrombocytosis secondary to prior splenectomy, normocytic anemia    Subjective        History of Present Illness    Patient returns in follow-up from her initial consultation on 5/21/2024 with laboratory studies and CT scans to review.  In the interval, patient notes that she has done quite well overall.  She does have chronic osteoarthritic pain in her knees.  She did follow-up with her PCP and they had decided not to pursue Ozempic primarily in relation to risk of suicidal ideation however patient would like to reconsider this and I encouraged her to discuss this with her psychiatrist as well.  She feels that her bipolar disorder is under excellent control currently.      Objective   Vital Signs:   BP (!) 186/88   Pulse 58   Temp 97.6 °F (36.4 °C) (Oral)   Resp 18   Ht 165.1 cm (65\")   Wt 108 kg (238 lb 9.6 oz)   SpO2 97%   BMI 39.71 kg/m²     Physical Exam  Constitutional:       Appearance: She is well-developed.   Eyes:      Conjunctiva/sclera: Conjunctivae normal.   Neck:      Thyroid: No thyromegaly.   Cardiovascular:      Rate and Rhythm: Normal rate and regular rhythm.      Heart sounds: No murmur heard.     No friction rub. No gallop.   Pulmonary:      Effort: No respiratory distress.      Breath sounds: Normal breath sounds.   Abdominal:      General: Bowel sounds are normal. There is no distension.      Palpations: Abdomen is soft.      Tenderness: There is no abdominal tenderness.   Lymphadenopathy:      Head:      Right side of head: No submandibular adenopathy.      Cervical: No cervical adenopathy.      Upper Body:      Right upper body: No supraclavicular adenopathy.      Left upper body: No supraclavicular adenopathy.   Skin:     General: Skin is warm and dry.      Findings: No rash.   Neurological:      Mental Status: She is alert and oriented to person, place, and time.      Cranial Nerves: No cranial nerve deficit.      Motor: No abnormal muscle " tone.      Deep Tendon Reflexes: Reflexes normal.   Psychiatric:         Behavior: Behavior normal.        Patient was examined today, unchanged from above      Result Review : Reviewed CBC, CMP, iron panel, ferritin, B12 level from today.  Reviewed CT chest abdomen pelvis from 8/14/2024.  Reviewed PCP records.       Assessment and Plan     *History of gastric GIST in 2011  Patient was diagnosed with gastric GIST in 2011 after experiencing GI bleed on anticoagulation for pulmonary embolism.    She underwent surgical resection with partial gastrectomy and splenectomy in October 2011.  Pathology result not currently available however per records she had a 7 cm tumor.    She was seen by Dr. Potts in the [facility] system and received adjuvant imatinib from November 2011 until October 2012.    Patient opted to discontinue imatinib as she did not like continuing on the medication.    She was lost to follow-up after 2013.    CT scan abdomen and pelvis in the [facility] system on 2/6/2013 noted splenules in the left upper quadrant, prominence of the gastric fundus.  CT chest abdomen pelvis 1/23/2024 showed prominent anterior mediastinal lymph nodes 1 cm in diameter of unclear significance.  There was notation of nodules in the splenic resection bed up to 2.5 x 2.6 cm felt to likely be splenules although could not rule out metastatic/recurrent disease.  CT chest abdomen pelvis 8/14/2024 with stable findings, no evidence of recurrent disease  Patient returns today in follow-up from her initial consultation.  We did repeat CT scan at a 6-month interval on 8/14/2024 showing stable findings.  The nodules noted in the splenic bed are likely small splenules.  There is no pathologic lymphadenopathy.  There is no evidence to suggest recurrent disease.  Patient is now 12 years out from discontinuation of imatinib.  We will transition to annual follow-up.  We did discuss pursuit of 1 further CT scan prior to her visit in 1 year and if that  study is unchanged we will forego any further routine radiographic monitoring and we will monitor her purely on a clinical basis annually thereafter.    *Normocytic anemia  Patient is status post partial gastrectomy in 2011.  Patient with hemoglobin that has been in the 11-12 range with normal MCV  Most recent anemia evaluation on 12/13/2023 with iron 40, ferritin 165, iron saturation 12%, TIBC 338, B12 1935, folate greater than 20  Patient reports that oral B12 supplementation was discontinued in December 2023 due to elevated level.  Labs on 5/21/2024 with hemoglobin 12.3, iron 54, ferritin 150, iron saturation 14%, TIBC 383, B12 837, folate greater than 20  Patient returns today with hemoglobin that has decreased slightly at 11.7 but remains in range with prior values that have been in the 11-12 range longer-term.  Repeat labs today with iron 42, ferritin 107, iron saturation 12%, TIBC 365, B12 normal at 928.  Patient has undergone previous partial gastrectomy and may be at risk for iron deficiency and current B12 deficiency.  With low iron saturation we will repeat CBC with stat iron panel/ferritin and RN review in 6 months and again repeat CBC, iron panel, ferritin as well as B12 level in 1 year at return visit.    *Intermittent thrombocytosis secondary to postsplenectomy state  Patient underwent splenectomy at the time of her partial gastrectomy for her GIST tumor in 2011  Patient has had intermittent thrombocytosis since splenectomy  Patient was evaluated by hematology in the [facility] system due to thrombocytosis in 2015.  Peripheral blood JAK2 V617F mutation was negative on 7/24/2015.  Thrombocytosis attributed to patient's postsplenectomy status.  Platelet count recently was elevated on 12/13/2023 of 480,000   Today, platelet count is normal at 336,000    *History of pulmonary embolism in 2011  The patient had pulmonary embolism in 2011 which ultimately led to diagnosis of her GIST as she experienced GI bleed  on anticoagulation.    She received Coumadin anticoagulation which she eventually discontinued on her own around 2012, did not like continuing on INR monitoring and opted to transition to aspirin 81 mg x 2 daily.    She has not experienced any recurrent thrombosis.    *Coronary artery disease, paroxysmal atrial fibrillation, history of TIA x 2 (2006)  Patient reports history of coronary artery disease, has had a remote MI.    She has had paroxysmal atrial fibrillation but none noted in many years.    She does report a history of TIA x 2 in 2006 which she feels was related to crack cocaine use at the time.    As above opted not to continue on Coumadin but to continue aspirin 81 mg x 2 daily.    She continues routine follow-up with cardiology.    *Bipolar disorder  Patient has a history of bipolar disorder, continues regular follow-up with her psychiatrist Dr. Bowles every 3 months.    She reports stable symptoms currently on Abilify, Lamictal, and Xanax as needed.    *Borderline diabetes  Patient with borderline diabetes, most recent hemoglobin A1c on 5/7/2024 was 6.1  There was consideration regarding potential use of Ozempic.  There does not appear to be any indication to avoid this in the setting of history of gastric GIST.  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  GIST is not felt to be commonly associated with MEN2. There was concern however regarding more recent caution with Ozempic and potential suicidal ideation in the setting of her bipolar disorder.  Patient today reports that she would like to revisit this issue and I suggested that she discuss the issue of Ozempic use with her psychiatrist.    *Hypertension  Patient continues on azilsartan/chlorthalidone, amlodipine, carvedilol, hydralazine    *Status post splenectomy  Patient underwent splenectomy at the time of her partial gastrectomy for her GIST tumor in 2011  Unclear  whether patient received pre or postsplenectomy vaccinations       Plan:  Patient is status post resection of gastric GIST in 2011.  Received adjuvant imatinib from November 2011 until October 2012, decided on her own to discontinue the medication.  Patient continues with no evidence of recurrence.  In 6 months CBC, stat iron panel/ferritin and RN review  MD visit in 1 year with CBC, CMP, B12 level, stat iron panel/ferritin.  1 week prior CT chest abdomen pelvis

## 2024-08-21 ENCOUNTER — OFFICE VISIT (OUTPATIENT)
Dept: ONCOLOGY | Facility: CLINIC | Age: 64
End: 2024-08-21
Payer: OTHER GOVERNMENT

## 2024-08-21 ENCOUNTER — LAB (OUTPATIENT)
Dept: LAB | Facility: HOSPITAL | Age: 64
End: 2024-08-21
Payer: OTHER GOVERNMENT

## 2024-08-21 VITALS
BODY MASS INDEX: 39.75 KG/M2 | DIASTOLIC BLOOD PRESSURE: 88 MMHG | WEIGHT: 238.6 LBS | OXYGEN SATURATION: 97 % | SYSTOLIC BLOOD PRESSURE: 186 MMHG | TEMPERATURE: 97.6 F | HEART RATE: 58 BPM | HEIGHT: 65 IN | RESPIRATION RATE: 18 BRPM

## 2024-08-21 DIAGNOSIS — D64.9 NORMOCYTIC ANEMIA: ICD-10-CM

## 2024-08-21 DIAGNOSIS — C49.A2 MALIGNANT GASTROINTESTINAL STROMAL TUMOR (GIST) OF STOMACH: ICD-10-CM

## 2024-08-21 DIAGNOSIS — C49.A2 MALIGNANT GASTROINTESTINAL STROMAL TUMOR (GIST) OF STOMACH: Primary | ICD-10-CM

## 2024-08-21 DIAGNOSIS — Z90.81 S/P SPLENECTOMY: ICD-10-CM

## 2024-08-21 LAB
ALBUMIN SERPL-MCNC: 3.9 G/DL (ref 3.5–5.2)
ALBUMIN/GLOB SERPL: 1.1 G/DL
ALP SERPL-CCNC: 91 U/L (ref 39–117)
ALT SERPL W P-5'-P-CCNC: 11 U/L (ref 1–33)
ANION GAP SERPL CALCULATED.3IONS-SCNC: 12 MMOL/L (ref 5–15)
AST SERPL-CCNC: 21 U/L (ref 1–32)
BASOPHILS # BLD AUTO: 0.07 10*3/MM3 (ref 0–0.2)
BASOPHILS NFR BLD AUTO: 1.1 % (ref 0–1.5)
BILIRUB SERPL-MCNC: 0.2 MG/DL (ref 0–1.2)
BUN SERPL-MCNC: 9 MG/DL (ref 8–23)
BUN/CREAT SERPL: 16.4 (ref 7–25)
CALCIUM SPEC-SCNC: 8.8 MG/DL (ref 8.6–10.5)
CHLORIDE SERPL-SCNC: 102 MMOL/L (ref 98–107)
CO2 SERPL-SCNC: 23 MMOL/L (ref 22–29)
CREAT SERPL-MCNC: 0.55 MG/DL (ref 0.57–1)
DEPRECATED RDW RBC AUTO: 51.7 FL (ref 37–54)
EGFRCR SERPLBLD CKD-EPI 2021: 103.8 ML/MIN/1.73
EOSINOPHIL # BLD AUTO: 0.09 10*3/MM3 (ref 0–0.4)
EOSINOPHIL NFR BLD AUTO: 1.4 % (ref 0.3–6.2)
ERYTHROCYTE [DISTWIDTH] IN BLOOD BY AUTOMATED COUNT: 15.6 % (ref 12.3–15.4)
FERRITIN SERPL-MCNC: 107 NG/ML (ref 13–150)
GLOBULIN UR ELPH-MCNC: 3.5 GM/DL
GLUCOSE SERPL-MCNC: 97 MG/DL (ref 65–99)
HCT VFR BLD AUTO: 36.8 % (ref 34–46.6)
HGB BLD-MCNC: 11.7 G/DL (ref 12–15.9)
IMM GRANULOCYTES # BLD AUTO: 0.02 10*3/MM3 (ref 0–0.05)
IMM GRANULOCYTES NFR BLD AUTO: 0.3 % (ref 0–0.5)
IRON 24H UR-MRATE: 42 MCG/DL (ref 37–145)
IRON SATN MFR SERPL: 12 % (ref 20–50)
LYMPHOCYTES # BLD AUTO: 2.7 10*3/MM3 (ref 0.7–3.1)
LYMPHOCYTES NFR BLD AUTO: 42.9 % (ref 19.6–45.3)
MCH RBC QN AUTO: 28.7 PG (ref 26.6–33)
MCHC RBC AUTO-ENTMCNC: 31.8 G/DL (ref 31.5–35.7)
MCV RBC AUTO: 90.4 FL (ref 79–97)
MONOCYTES # BLD AUTO: 0.54 10*3/MM3 (ref 0.1–0.9)
MONOCYTES NFR BLD AUTO: 8.6 % (ref 5–12)
NEUTROPHILS NFR BLD AUTO: 2.87 10*3/MM3 (ref 1.7–7)
NEUTROPHILS NFR BLD AUTO: 45.7 % (ref 42.7–76)
NRBC BLD AUTO-RTO: 0 /100 WBC (ref 0–0.2)
PLATELET # BLD AUTO: 336 10*3/MM3 (ref 140–450)
PMV BLD AUTO: 10.6 FL (ref 6–12)
POTASSIUM SERPL-SCNC: 4.1 MMOL/L (ref 3.5–5.2)
PROT SERPL-MCNC: 7.4 G/DL (ref 6–8.5)
RBC # BLD AUTO: 4.07 10*6/MM3 (ref 3.77–5.28)
SODIUM SERPL-SCNC: 137 MMOL/L (ref 136–145)
TIBC SERPL-MCNC: 365 MCG/DL (ref 298–536)
TRANSFERRIN SERPL-MCNC: 245 MG/DL (ref 200–360)
VIT B12 BLD-MCNC: 928 PG/ML (ref 211–946)
WBC NRBC COR # BLD AUTO: 6.29 10*3/MM3 (ref 3.4–10.8)

## 2024-08-21 PROCEDURE — 99214 OFFICE O/P EST MOD 30 MIN: CPT | Performed by: INTERNAL MEDICINE

## 2024-08-21 PROCEDURE — 83540 ASSAY OF IRON: CPT

## 2024-08-21 PROCEDURE — 82728 ASSAY OF FERRITIN: CPT

## 2024-08-21 PROCEDURE — 84466 ASSAY OF TRANSFERRIN: CPT

## 2024-08-21 PROCEDURE — 85025 COMPLETE CBC W/AUTO DIFF WBC: CPT

## 2024-08-21 PROCEDURE — 36415 COLL VENOUS BLD VENIPUNCTURE: CPT

## 2024-08-21 PROCEDURE — 82607 VITAMIN B-12: CPT | Performed by: INTERNAL MEDICINE

## 2024-08-21 PROCEDURE — 80053 COMPREHEN METABOLIC PANEL: CPT

## 2024-11-08 ENCOUNTER — OFFICE VISIT (OUTPATIENT)
Dept: FAMILY MEDICINE CLINIC | Facility: CLINIC | Age: 64
End: 2024-11-08
Payer: OTHER GOVERNMENT

## 2024-11-08 VITALS
BODY MASS INDEX: 38.65 KG/M2 | SYSTOLIC BLOOD PRESSURE: 144 MMHG | TEMPERATURE: 98.4 F | OXYGEN SATURATION: 98 % | HEIGHT: 65 IN | DIASTOLIC BLOOD PRESSURE: 80 MMHG | WEIGHT: 232 LBS | HEART RATE: 63 BPM

## 2024-11-08 DIAGNOSIS — M54.2 NECK PAIN: Primary | ICD-10-CM

## 2024-11-08 PROCEDURE — 99213 OFFICE O/P EST LOW 20 MIN: CPT

## 2024-11-08 RX ORDER — BACLOFEN 10 MG/1
10 TABLET ORAL 3 TIMES DAILY
Qty: 30 TABLET | Refills: 0 | Status: SHIPPED | OUTPATIENT
Start: 2024-11-08
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
## 2024-11-19 ENCOUNTER — HOSPITAL ENCOUNTER (OUTPATIENT)
Dept: GENERAL RADIOLOGY | Facility: HOSPITAL | Age: 64
Discharge: HOME | End: 2024-11-19
Payer: OTHER GOVERNMENT

## 2024-11-19 PROCEDURE — 72050 X-RAY EXAM NECK SPINE 4/5VWS: CPT

## 2024-11-24 DIAGNOSIS — M54.2 NECK PAIN: ICD-10-CM

## 2024-11-24 DIAGNOSIS — M50.30 DDD (DEGENERATIVE DISC DISEASE), CERVICAL: Primary | ICD-10-CM

## 2024-12-09 ENCOUNTER — TRANSCRIBE ORDERS (OUTPATIENT)
Dept: ADMINISTRATIVE | Facility: HOSPITAL | Age: 64
End: 2024-12-09
Payer: OTHER GOVERNMENT

## 2024-12-09 DIAGNOSIS — M75.101 ROTATOR CUFF SYNDROME OF RIGHT SHOULDER: ICD-10-CM

## 2024-12-09 DIAGNOSIS — M54.2 CERVICAL PAIN: Primary | ICD-10-CM

## 2024-12-11 ENCOUNTER — TELEPHONE (OUTPATIENT)
Dept: PHYSICAL THERAPY | Facility: CLINIC | Age: 64
End: 2024-12-11

## 2024-12-11 NOTE — TELEPHONE ENCOUNTER
HAS ANOTHER DR APPOINTMENT WITH PAIT MOORE - WANTS TO SEE WHAT SHE WANTS HER TO DO BEFORE GOING TO PHYSICAL THERAPY

## 2024-12-12 ENCOUNTER — OFFICE VISIT (OUTPATIENT)
Dept: FAMILY MEDICINE CLINIC | Facility: CLINIC | Age: 64
End: 2024-12-12
Payer: OTHER GOVERNMENT

## 2024-12-12 ENCOUNTER — PRIOR AUTHORIZATION (OUTPATIENT)
Dept: FAMILY MEDICINE CLINIC | Facility: CLINIC | Age: 64
End: 2024-12-12
Payer: OTHER GOVERNMENT

## 2024-12-12 VITALS
SYSTOLIC BLOOD PRESSURE: 124 MMHG | HEART RATE: 63 BPM | BODY MASS INDEX: 37.99 KG/M2 | DIASTOLIC BLOOD PRESSURE: 76 MMHG | HEIGHT: 65 IN | TEMPERATURE: 97.2 F | RESPIRATION RATE: 16 BRPM | OXYGEN SATURATION: 99 % | WEIGHT: 228 LBS

## 2024-12-12 DIAGNOSIS — E55.9 VITAMIN D DEFICIENCY: ICD-10-CM

## 2024-12-12 DIAGNOSIS — E66.9 OBESITY (BMI 30-39.9): ICD-10-CM

## 2024-12-12 DIAGNOSIS — Z01.419 ENCOUNTER FOR ANNUAL ROUTINE GYNECOLOGICAL EXAMINATION: ICD-10-CM

## 2024-12-12 DIAGNOSIS — I10 PRIMARY HYPERTENSION: Primary | ICD-10-CM

## 2024-12-12 DIAGNOSIS — Z12.31 ENCOUNTER FOR SCREENING MAMMOGRAM FOR BREAST CANCER: ICD-10-CM

## 2024-12-12 DIAGNOSIS — Z90.81 S/P SPLENECTOMY: ICD-10-CM

## 2024-12-12 DIAGNOSIS — R73.03 PREDIABETES: ICD-10-CM

## 2024-12-12 PROCEDURE — 99214 OFFICE O/P EST MOD 30 MIN: CPT | Performed by: PHYSICIAN ASSISTANT

## 2024-12-12 RX ORDER — MELATONIN 10 MG
1 CAPSULE ORAL NIGHTLY PRN
COMMUNITY
Start: 2024-12-06

## 2024-12-12 RX ORDER — SEMAGLUTIDE 0.25 MG/.5ML
0.25 INJECTION, SOLUTION SUBCUTANEOUS WEEKLY
Qty: 2 ML | Refills: 0 | Status: SHIPPED | OUTPATIENT
Start: 2024-12-12

## 2024-12-12 RX ORDER — HYDRALAZINE HYDROCHLORIDE 50 MG/1
75 TABLET, FILM COATED ORAL 3 TIMES DAILY
Qty: 405 TABLET | Refills: 3 | Status: SHIPPED | OUTPATIENT
Start: 2024-12-12

## 2024-12-12 NOTE — PROGRESS NOTES
Zaria James is a 64 y.o. female.     History of Present Illness  The patient is a 64-year-old female who presents for evaluation of obesity, prediabetes, bipolar disorder, and health maintenance.    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 This has subsequently affected her platelet count. She was seen by Dr. Garcias on 05/21/2024, who noted no evidence of recurrent GIST tumor and no contraindications to the use of Ozempic from the standpoint of the GIST tumor. However, there is a potential risk of depression, requiring monitoring by Dr. Bowles. She had chemotherapy after the tumor. She saw Dr. Garcias again on 08/21/2024, who was satisfied with her stable labs. She is still at risk because of the tumor to develop medullary thyroid cancer, although her tumor is not commonly associated with it. Ozempic and Wegovy can worsen medullary thyroid cancer if predisposed. She has no family history of medullary thyroid cancer.    She has a history of pulmonary embolism in 2011 and is under the care of Dr. Rodriguez for heart disease, TIA, and stroke related to blood pressure and episodes of atrial fibrillation. She is not currently on any anticoagulants. She has an appointment with Dr. Rodriguez in February 2025. She takes hydralazine 1.5 pills 3 times a day.    She is under the care of Dr. Mathis for shoulder and back issues and is scheduled for a study.    She has been prediabetic for several years, with an A1c level above 6. She discontinued metformin due to gastrointestinal side effects.    She has a diagnosis of bipolar disorder managed by Dr. Bowles.    She is not currently exercising due to knee issues and receives injections every 3 months. She has no history of abnormal Pap smears and expresses concern about decreased libido, which she attributes to not taking hormones. She is not on disability and is not  "currently on Medicare.    FAMILY HISTORY  She has no family history of medullary thyroid cancer.    MEDICATIONS  Current: Abilify, hydralazine  Past: Lovenox, metformin      Since the last visit, she has overall felt fairly well.  She has Primary Hypertension and well controlled on current medication.    Sees zehrausama has been compliant with current medications have reviewed them.  The patient denies medication side effects.  Will refill medications. /76 (BP Location: Right arm, Patient Position: Sitting, Cuff Size: Adult)   Pulse 63   Temp 97.2 °F (36.2 °C) (Temporal)   Resp 16   Ht 165.1 cm (65\")   Wt 103 kg (228 lb)   SpO2 99%   BMI 37.94 kg/m² .          Sees DR Bowles for psych      Results for orders placed or performed in visit on 08/21/24   Comprehensive Metabolic Panel    Collection Time: 08/21/24 11:09 AM    Specimen: Blood   Result Value Ref Range    Glucose 97 65 - 99 mg/dL    BUN 9 8 - 23 mg/dL    Creatinine 0.55 (L) 0.57 - 1.00 mg/dL    Sodium 137 136 - 145 mmol/L    Potassium 4.1 3.5 - 5.2 mmol/L    Chloride 102 98 - 107 mmol/L    CO2 23.0 22.0 - 29.0 mmol/L    Calcium 8.8 8.6 - 10.5 mg/dL    Total Protein 7.4 6.0 - 8.5 g/dL    Albumin 3.9 3.5 - 5.2 g/dL    ALT (SGPT) 11 1 - 33 U/L    AST (SGOT) 21 1 - 32 U/L    Alkaline Phosphatase 91 39 - 117 U/L    Total Bilirubin 0.2 0.0 - 1.2 mg/dL    Globulin 3.5 gm/dL    A/G Ratio 1.1 g/dL    BUN/Creatinine Ratio 16.4 7.0 - 25.0    Anion Gap 12.0 5.0 - 15.0 mmol/L    eGFR 103.8 >60.0 mL/min/1.73   Ferritin    Collection Time: 08/21/24 11:09 AM    Specimen: Blood   Result Value Ref Range    Ferritin 107.00 13.00 - 150.00 ng/mL   Iron Profile    Collection Time: 08/21/24 11:09 AM    Specimen: Blood   Result Value Ref Range    Iron 42 37 - 145 mcg/dL    Iron Saturation (TSAT) 12 (L) 20 - 50 %    Transferrin 245 200 - 360 mg/dL    TIBC 365 298 - 536 mcg/dL   Vitamin B12    Collection Time: 08/21/24 11:09 AM    Specimen: Blood   Result Value Ref Range    " Vitamin B-12 928 211 - 946 pg/mL   CBC Auto Differential    Collection Time: 08/21/24 11:09 AM    Specimen: Blood   Result Value Ref Range    WBC 6.29 3.40 - 10.80 10*3/mm3    RBC 4.07 3.77 - 5.28 10*6/mm3    Hemoglobin 11.7 (L) 12.0 - 15.9 g/dL    Hematocrit 36.8 34.0 - 46.6 %    MCV 90.4 79.0 - 97.0 fL    MCH 28.7 26.6 - 33.0 pg    MCHC 31.8 31.5 - 35.7 g/dL    RDW 15.6 (H) 12.3 - 15.4 %    RDW-SD 51.7 37.0 - 54.0 fl    MPV 10.6 6.0 - 12.0 fL    Platelets 336 140 - 450 10*3/mm3    Neutrophil % 45.7 42.7 - 76.0 %    Lymphocyte % 42.9 19.6 - 45.3 %    Monocyte % 8.6 5.0 - 12.0 %    Eosinophil % 1.4 0.3 - 6.2 %    Basophil % 1.1 0.0 - 1.5 %    Immature Grans % 0.3 0.0 - 0.5 %    Neutrophils, Absolute 2.87 1.70 - 7.00 10*3/mm3    Lymphocytes, Absolute 2.70 0.70 - 3.10 10*3/mm3    Monocytes, Absolute 0.54 0.10 - 0.90 10*3/mm3    Eosinophils, Absolute 0.09 0.00 - 0.40 10*3/mm3    Basophils, Absolute 0.07 0.00 - 0.20 10*3/mm3    Immature Grans, Absolute 0.02 0.00 - 0.05 10*3/mm3    nRBC 0.0 0.0 - 0.2 /100 WBC           The following portions of the patient's history were reviewed and updated as appropriate: allergies, current medications, past family history, past medical history, past social history, past surgical history, and problem list.    Review of Systems   Constitutional:  Negative for diaphoresis.   HENT:  Negative for nosebleeds and trouble swallowing.    Eyes:  Negative for blurred vision and visual disturbance.   Respiratory:  Negative for choking.    Gastrointestinal:  Negative for blood in stool.   Musculoskeletal:  Positive for arthralgias and back pain.   Allergic/Immunologic: Negative for immunocompromised state.   Neurological:  Negative for facial asymmetry and speech difficulty.   Psychiatric/Behavioral:  Negative for self-injury and suicidal ideas.        Objective   Physical Exam  Vitals and nursing note reviewed.   Constitutional:       General: She is not in acute distress.     Appearance: She is  well-developed. She is not ill-appearing or toxic-appearing.   HENT:      Head: Normocephalic.      Right Ear: External ear normal.      Left Ear: External ear normal.      Nose: Nose normal.      Mouth/Throat:      Pharynx: Oropharynx is clear.   Eyes:      General: No scleral icterus.     Conjunctiva/sclera: Conjunctivae normal.      Pupils: Pupils are equal, round, and reactive to light.   Neck:      Thyroid: No thyromegaly.   Cardiovascular:      Rate and Rhythm: Normal rate and regular rhythm.      Pulses: Normal pulses.      Heart sounds: Normal heart sounds. Murmur heard.   Pulmonary:      Effort: Pulmonary effort is normal. No respiratory distress.      Breath sounds: Normal breath sounds.   Musculoskeletal:         General: No deformity. Normal range of motion.      Cervical back: Normal range of motion and neck supple.      Right lower leg: No edema.      Left lower leg: No edema.   Skin:     General: Skin is warm and dry.      Findings: No rash.   Neurological:      General: No focal deficit present.      Mental Status: She is alert and oriented to person, place, and time. Mental status is at baseline.   Psychiatric:         Mood and Affect: Mood normal.         Behavior: Behavior normal.         Thought Content: Thought content normal.         Judgment: Judgment normal.         Physical Exam  Lungs are clear.  Heart sounds are normal.    Vital Signs  Blood pressure is normal. BMI is 37.9.       Results  Laboratory Studies  A1c was down to 5.9 six months ago. Liver and kidney functions have been normal.    Imaging  CT of abdomen showed coronary artery calcifications, no note of fatty liver.       Assessment & Plan   Diagnoses and all orders for this visit:    1. Primary hypertension (Primary)  -     Semaglutide-Weight Management (Wegovy) 0.25 MG/0.5ML solution auto-injector; Inject 0.5 mL under the skin into the appropriate area as directed 1 (One) Time Per Week. Inject 0.25 mg SQ weekly x4  Dispense: 2  mL; Refill: 0    2. S/P splenectomy    3. Prediabetes  -     Semaglutide-Weight Management (Wegovy) 0.25 MG/0.5ML solution auto-injector; Inject 0.5 mL under the skin into the appropriate area as directed 1 (One) Time Per Week. Inject 0.25 mg SQ weekly x4  Dispense: 2 mL; Refill: 0    4. Vitamin D deficiency    5. Encounter for screening mammogram for breast cancer  -     Mammo Screening Digital Tomosynthesis Bilateral With CAD    6. Encounter for annual routine gynecological examination  -     Ambulatory Referral to Obstetrics / Gynecology    7. Obesity (BMI 30-39.9)  -     Semaglutide-Weight Management (Wegovy) 0.25 MG/0.5ML solution auto-injector; Inject 0.5 mL under the skin into the appropriate area as directed 1 (One) Time Per Week. Inject 0.25 mg SQ weekly x4  Dispense: 2 mL; Refill: 0    Other orders  -     hydrALAZINE (APRESOLINE) 50 MG tablet; Take 1.5 tablets by mouth 3 (Three) Times a Day.  Dispense: 405 tablet; Refill: 3        Assessment & Plan  1. Obesity.  Her body mass index (BMI) is currently 37.9, indicating obesity but not morbid obesity. Weight loss would also help her blood pressure. She is on Abilify, which can cause weight gain. A prescription for Wegovy will be submitted to [facility], pending prior authorization. She has been informed about the potential side effects of Wegovy, including a 3% chance of pancreatitis, nausea, vomiting, and diarrhea. She has been advised to consult with her psychiatrist regarding the initiation of Wegovy for weight management.    2. Prediabetes.  Her A1c was 5.9 six months ago, indicating prediabetes. She has been prediabetic for several years. Continued monitoring of her A1c levels is recommended. She is not currently on metformin due to previous gastrointestinal upset.    3. Bipolar disorder.  She is under the care of Dr. Bowles for bipolar disorder. The medication she takes for bipolar disorder can affect her sex drive. She has been informed that her  medication, Abilify, can cause weight gain. She has been advised to discuss with her psychiatrist the possibility of adjusting her medication to aid in weight loss.    4. Health maintenance.  She does not have obstructive sleep apnea. Her liver and kidney functions are normal. She is due for colon cancer screening. An order for a screening mammogram has been placed. She has been advised to schedule a Pap smear. She has been encouraged to contact Dr. Wheeler to arrange for colon cancer screening.    5. GIST tumor.  She has a history of GIST tumor from 2011, with a partial gastrectomy and splenectomy. Dr. Garcias noted no evidence of recurrent GIST tumor and no contraindications to the use of Ozempic from the standpoint of the GIST tumor. However, there is a potential risk of depression, requiring monitoring by Dr. Bowles. She had chemotherapy after the tumor. She saw Dr. Garcias again on 08/21/2024, who was satisfied with her stable labs. She is still at risk because of the tumor to develop medullary thyroid cancer, although her tumor is not commonly associated with it. Ozempic and Wegovy can worsen medullary thyroid cancer if predisposed. She has no family history of medullary thyroid cancer. She has been advised to continue follow-up with Dr. Garcias for monitoring.    6. Pulmonary embolism.  She had a pulmonary embolism in 2011. She is not currently on any blood thinners.    7. Heart disease.  She has a history of heart disease and is under the care of Dr. Neves. She takes hydralazine 1.5 pills 3 times a day. She has an appointment with Dr. Rodriguez in February 2025.    8. Transient ischemic attack (TIA) and stroke.  She has a history of TIA and stroke related to blood pressure and episodes of atrial fibrillation. She is not currently on any blood thinners.    9. Shoulder and back pain.  She is seeing Dr. Mathis for her shoulder and back pain. She is scheduled for a study to further evaluate her  condition.    PROCEDURE  The patient underwent partial gastrectomy and splenectomy in 2011.     Needs pap----wants to see GYN  Update mammo  Bp great today    I am going to wait on labs she had labs when she saw oncology hematology  Still want her to have screening colonoscopy she can contact Dr. Wheeler to set up         Patient or patient representative verbalized consent for the use of Ambient Listening during the visit with  Mary Haddad PA-C for chart documentation. 12/12/2024  11:34 EST

## 2024-12-12 NOTE — TELEPHONE ENCOUNTER
Semaglutide-Weight Management (Wegovy) 0.25 MG/0.5ML solution auto-injector     CaseId: [id number]  Status: Approved  Coverage Start Date:11/12/2024  Coverage End Date:12/12/2025

## 2024-12-20 ENCOUNTER — TRANSCRIBE ORDERS (OUTPATIENT)
Dept: ADMINISTRATIVE | Facility: HOSPITAL | Age: 64
End: 2024-12-20
Payer: OTHER GOVERNMENT

## 2024-12-20 DIAGNOSIS — M75.101 ROTATOR CUFF SYNDROME OF RIGHT SHOULDER: Primary | ICD-10-CM

## 2024-12-23 ENCOUNTER — HOSPITAL ENCOUNTER (OUTPATIENT)
Dept: CT IMAGING | Facility: HOSPITAL | Age: 64
Discharge: HOME | End: 2024-12-23
Admitting: ORTHOPAEDIC SURGERY
Payer: OTHER GOVERNMENT

## 2024-12-23 DIAGNOSIS — M54.2 CERVICAL PAIN: ICD-10-CM

## 2024-12-23 PROCEDURE — 72125 CT NECK SPINE W/O DYE: CPT

## 2024-12-27 ENCOUNTER — HOSPITAL ENCOUNTER (OUTPATIENT)
Dept: GENERAL RADIOLOGY | Facility: HOSPITAL | Age: 64
Discharge: HOME | End: 2024-12-27
Payer: OTHER GOVERNMENT

## 2024-12-27 ENCOUNTER — HOSPITAL ENCOUNTER (OUTPATIENT)
Dept: CT IMAGING | Facility: HOSPITAL | Age: 64
Discharge: HOME | End: 2024-12-27
Payer: OTHER GOVERNMENT

## 2024-12-27 DIAGNOSIS — M75.101 ROTATOR CUFF SYNDROME OF RIGHT SHOULDER: ICD-10-CM

## 2024-12-27 PROCEDURE — 25510000001 IOPAMIDOL 61 % SOLUTION: Performed by: RADIOLOGY

## 2024-12-27 PROCEDURE — 77002 NEEDLE LOCALIZATION BY XRAY: CPT

## 2024-12-27 PROCEDURE — 73201 CT UPPER EXTREMITY W/DYE: CPT

## 2024-12-27 PROCEDURE — 25010000002 LIDOCAINE 1 % SOLUTION: Performed by: RADIOLOGY

## 2024-12-27 RX ORDER — IOPAMIDOL 612 MG/ML
100 INJECTION, SOLUTION INTRAVASCULAR
Status: COMPLETED | OUTPATIENT
Start: 2024-12-27 | End: 2024-12-27

## 2024-12-27 RX ORDER — LIDOCAINE HYDROCHLORIDE 10 MG/ML
10 INJECTION, SOLUTION INFILTRATION; PERINEURAL ONCE
Status: COMPLETED | OUTPATIENT
Start: 2024-12-27 | End: 2024-12-27

## 2024-12-27 RX ADMIN — LIDOCAINE HYDROCHLORIDE 7 ML: 10 INJECTION, SOLUTION INFILTRATION; PERINEURAL at 11:52

## 2024-12-27 RX ADMIN — IOPAMIDOL 12 ML: 612 INJECTION, SOLUTION INTRAVENOUS at 11:52

## 2025-01-13 ENCOUNTER — TELEPHONE (OUTPATIENT)
Dept: FAMILY MEDICINE CLINIC | Facility: CLINIC | Age: 65
End: 2025-01-13
Payer: OTHER GOVERNMENT

## 2025-01-13 RX ORDER — SEMAGLUTIDE 0.5 MG/.5ML
0.5 INJECTION, SOLUTION SUBCUTANEOUS WEEKLY
Qty: 2 ML | Refills: 2 | Status: SHIPPED | OUTPATIENT
Start: 2025-01-13

## 2025-01-13 NOTE — TELEPHONE ENCOUNTER
Caller: Henny James    Relationship: Self    Best call back number: [phone]      What medication are you requesting: Semaglutide-Weight Management (Wegovy) - INCREASED DOSAGE      Have you had these symptoms before:    [x] Yes  [] No    Have you been treated for these symptoms before:   [x] Yes  [] No    If a prescription is needed, what is your preferred pharmacy and phone number: [id number] [place], [place] - [address] [facility] [address] [facility] [phone] [facility] [phone]      Additional notes: SHE HAS ONE MORE DOSE OF THE CURRENT DOSAGE AND WOULD LIKE TO INCREASE IT.

## 2025-02-12 ENCOUNTER — HOSPITAL ENCOUNTER (OUTPATIENT)
Dept: MAMMOGRAPHY | Facility: HOSPITAL | Age: 65
Discharge: HOME | End: 2025-02-12
Payer: OTHER GOVERNMENT

## 2025-02-19 ENCOUNTER — TELEPHONE (OUTPATIENT)
Dept: OBSTETRICS AND GYNECOLOGY | Facility: CLINIC | Age: 65
End: 2025-02-19

## 2025-02-19 NOTE — TELEPHONE ENCOUNTER
Caller: Henny James    Relationship:  Self    Best call back number: [phone]    PATIENT CALLED REQUESTING TO CANCEL SAME DAY APPT.    Did the patient call AFTER the start time of their scheduled appointment?  []YES  [x]NO    Was the patient's appointment rescheduled? [x]YES  []NO 4/16/25    Any additional information: WEATHER

## 2025-03-04 ENCOUNTER — TELEPHONE (OUTPATIENT)
Dept: FAMILY MEDICINE CLINIC | Facility: CLINIC | Age: 65
End: 2025-03-04
Payer: OTHER GOVERNMENT

## 2025-03-04 RX ORDER — SEMAGLUTIDE 1 MG/.5ML
1 INJECTION, SOLUTION SUBCUTANEOUS WEEKLY
Qty: 2 ML | Refills: 5 | Status: SHIPPED | OUTPATIENT
Start: 2025-03-04

## 2025-03-04 NOTE — TELEPHONE ENCOUNTER
Caller: Henny James    Relationship: Self    Best call back number: [phone]    What medication are you requesting: WEGOVY 7MG    If a prescription is needed, what is your preferred pharmacy and phone number: [id number] [place], [place] - [address] [facility] [address] [facility] [phone] [facility] [phone]      Additional notes: PATIENT CALLING REQUESTING AN INCREASE IN THE DOSE OF THE WEGOVY. PATIENT STATES THAT SHE WILL TAKE THE LAST DOSE OF THE 4MG WEGOVY ON SUNDAY, DUE INJECTION ON 3/9/25.

## 2025-03-05 NOTE — TELEPHONE ENCOUNTER
HUB TO RELAY  Left patient a voicemail to inform her that her message was sent over to her provider and her provider   I will send the 1 mg and she can do a   follow-up appointment in the next month or   2      Patient was instructed to contact our office for further questions or concerns and to schedule appt.

## 2025-04-22 ENCOUNTER — TELEPHONE (OUTPATIENT)
Dept: FAMILY MEDICINE CLINIC | Facility: CLINIC | Age: 65
End: 2025-04-22
Payer: OTHER GOVERNMENT

## 2025-04-22 DIAGNOSIS — I10 PRIMARY HYPERTENSION: Primary | ICD-10-CM

## 2025-04-22 DIAGNOSIS — E78.2 MIXED HYPERLIPIDEMIA: ICD-10-CM

## 2025-04-22 DIAGNOSIS — R73.03 PREDIABETES: ICD-10-CM

## 2025-04-22 DIAGNOSIS — R73.01 IMPAIRED FASTING GLUCOSE: ICD-10-CM

## 2025-04-22 DIAGNOSIS — E78.2 HYPERLIPIDEMIA, MIXED: ICD-10-CM

## 2025-04-22 DIAGNOSIS — E66.9 OBESITY (BMI 30-39.9): ICD-10-CM

## 2025-04-22 RX ORDER — SEMAGLUTIDE 1.7 MG/.75ML
1.7 INJECTION, SOLUTION SUBCUTANEOUS WEEKLY
Qty: 3 ML | Refills: 11 | Status: SHIPPED | OUTPATIENT
Start: 2025-04-22

## 2025-04-22 NOTE — TELEPHONE ENCOUNTER
I can increase dose if need------stopped losing weight and no GI upset, or send refills  May appt fine

## 2025-04-22 NOTE — TELEPHONE ENCOUNTER
Caller: Henny James    Relationship: Self    Best call back number:   Telephone Information:   Mobile [phone]     What is the best time to reach you: ANYTIME    Who are you requesting to speak with (clinical staff, provider,  specific staff member): CLINICAL    What was the call regarding: PATIENT STATED THAT SHE IS ON WEGOVY AND HAD TO FOLLOW UP WITH BERNARDA MOORE SO PATIENT MADE NEXT AVAILABLE APPOINTMENT WHICH WAS 5/28/25. PATIENT STATED THAT SHE HAS 2 PENS LEFT AND HER NEXT INJECTION IS DUE ON 4/27/25. PATIENT IS WANTING TO KNOW IF BERNARDA MOORE WANTING TO KEEP HER ON THE SAME DOSAGE UNTIL HER APPOINTMENT OR GO AHEAD AND INCREASE. PLEASE CALLBACK AND ADVISE.

## 2025-05-13 ENCOUNTER — TELEPHONE (OUTPATIENT)
Dept: FAMILY MEDICINE CLINIC | Facility: CLINIC | Age: 65
End: 2025-05-13
Payer: OTHER GOVERNMENT

## 2025-05-13 RX ORDER — MECLIZINE HYDROCHLORIDE 25 MG/1
25 TABLET ORAL 3 TIMES DAILY PRN
Qty: 20 TABLET | Refills: 0 | Status: SHIPPED | OUTPATIENT
Start: 2025-05-13

## 2025-05-13 NOTE — TELEPHONE ENCOUNTER
I sent Antivert to pharmacy but she needs an appointment.  She needs to be seen and evaluated  Know that Antivert can make her drowsy but also will help with the nausea  ER if worse or if it is continue

## 2025-05-13 NOTE — TELEPHONE ENCOUNTER
Caller: Henny James    Relationship: Self    Best call back number: [phone]     What medication are you requesting: MEDICATION FOR DIZZINESS AND NAUSEA    What are your current symptoms: FOR ABOUT A WEEK STILL GETTING WORSE    How long have you been experiencing symptoms: DIZZINESS AND NAUSEA    Have you had these symptoms before:    [x] Yes  [] No    Have you been treated for these symptoms before:   [x] Yes  [] No    If a prescription is needed, what is your preferred pharmacy and phone number: [facility] #[id number] [place], [place] - [address] [facility] [address] [facility] [phone] [facility] [phone]      Additional notes: PATIENT IS REQUESTING TO BE PRESCRIBED A MEDICATION TO HELP WITH VERTIGO AND NAUSEA.    PATIENT STATED THE VERTIGO HAS BEEN GETTING WORSE SINCE STARTING WEGOVY.    PLEASE CALL PATIENT TO ADVISE.

## 2025-05-19 NOTE — TELEPHONE ENCOUNTER
Patient notified of prescription being sent to pharmacy pt stated that she already has picked it up and is doing good as of right now pt had no further questions or concerns at this time

## 2025-05-28 ENCOUNTER — OFFICE VISIT (OUTPATIENT)
Dept: FAMILY MEDICINE CLINIC | Facility: CLINIC | Age: 65
End: 2025-05-28
Payer: OTHER GOVERNMENT

## 2025-05-28 VITALS
SYSTOLIC BLOOD PRESSURE: 146 MMHG | DIASTOLIC BLOOD PRESSURE: 80 MMHG | TEMPERATURE: 96.5 F | HEIGHT: 65 IN | HEART RATE: 94 BPM | OXYGEN SATURATION: 99 % | BODY MASS INDEX: 35.59 KG/M2 | WEIGHT: 213.6 LBS

## 2025-05-28 DIAGNOSIS — Z12.31 ENCOUNTER FOR SCREENING MAMMOGRAM FOR BREAST CANCER: ICD-10-CM

## 2025-05-28 DIAGNOSIS — F31.9 BIPOLAR 1 DISORDER: ICD-10-CM

## 2025-05-28 DIAGNOSIS — R73.03 PREDIABETES: ICD-10-CM

## 2025-05-28 DIAGNOSIS — I10 PRIMARY HYPERTENSION: Primary | ICD-10-CM

## 2025-05-28 DIAGNOSIS — E78.2 HYPERLIPIDEMIA, MIXED: ICD-10-CM

## 2025-05-28 DIAGNOSIS — H81.10 BENIGN PAROXYSMAL POSITIONAL VERTIGO, UNSPECIFIED LATERALITY: ICD-10-CM

## 2025-05-28 DIAGNOSIS — Z12.11 SCREEN FOR COLON CANCER: ICD-10-CM

## 2025-05-28 DIAGNOSIS — E55.9 VITAMIN D DEFICIENCY: ICD-10-CM

## 2025-05-28 DIAGNOSIS — Z90.81 S/P SPLENECTOMY: ICD-10-CM

## 2025-05-28 DIAGNOSIS — I48.19 PERSISTENT ATRIAL FIBRILLATION: ICD-10-CM

## 2025-05-28 DIAGNOSIS — E78.2 MIXED HYPERLIPIDEMIA: ICD-10-CM

## 2025-05-28 RX ORDER — AMLODIPINE BESYLATE 10 MG/1
10 TABLET ORAL DAILY
Qty: 90 TABLET | Refills: 3 | Status: SHIPPED | OUTPATIENT
Start: 2025-05-28

## 2025-05-28 RX ORDER — CARVEDILOL 25 MG/1
37.5 TABLET ORAL 2 TIMES DAILY
Qty: 360 TABLET | Refills: 3 | Status: SHIPPED | OUTPATIENT
Start: 2025-05-28

## 2025-05-28 RX ORDER — HYDRALAZINE HYDROCHLORIDE 50 MG/1
75 TABLET, FILM COATED ORAL 3 TIMES DAILY
Qty: 405 TABLET | Refills: 3 | Status: SHIPPED | OUTPATIENT
Start: 2025-05-28

## 2025-05-28 RX ORDER — SEMAGLUTIDE 2.4 MG/.75ML
2.4 INJECTION, SOLUTION SUBCUTANEOUS WEEKLY
Qty: 3 ML | Refills: 11 | Status: SHIPPED | OUTPATIENT
Start: 2025-05-28

## 2025-05-28 NOTE — PROGRESS NOTES
"Subjective   Henny James is a 64 y.o. female.     History of Present Illness    Since the last visit, she has overall felt fairly well.  She has Primary Hypertension and well controlled on current medication, New diagnosis of DMII and plan to start medication with diet and exercise. Went over need to have yearly DM eye exam and copy me on this, suggest diabetes educator and dietician.  I have reviewed lab goals, Hyperlipidemia and working on this with diet and exercise, and Vitamin D deficiency and will update labs for continued management.  she has been compliant with current medications have reviewed them.  The patient denies medication side effects.  Will refill medications. /80   Pulse 94   Temp 96.5 °F (35.8 °C)   Ht 165.1 cm (65\")   Wt 96.9 kg (213 lb 9.6 oz)   SpO2 99%   BMI 35.54 kg/m² .        Home bp 140/80  Results for orders placed or performed in visit on 08/21/24   Comprehensive Metabolic Panel    Collection Time: 08/21/24 11:09 AM    Specimen: Blood   Result Value Ref Range    Glucose 97 65 - 99 mg/dL    BUN 9 8 - 23 mg/dL    Creatinine 0.55 (L) 0.57 - 1.00 mg/dL    Sodium 137 136 - 145 mmol/L    Potassium 4.1 3.5 - 5.2 mmol/L    Chloride 102 98 - 107 mmol/L    CO2 23.0 22.0 - 29.0 mmol/L    Calcium 8.8 8.6 - 10.5 mg/dL    Total Protein 7.4 6.0 - 8.5 g/dL    Albumin 3.9 3.5 - 5.2 g/dL    ALT (SGPT) 11 1 - 33 U/L    AST (SGOT) 21 1 - 32 U/L    Alkaline Phosphatase 91 39 - 117 U/L    Total Bilirubin 0.2 0.0 - 1.2 mg/dL    Globulin 3.5 gm/dL    A/G Ratio 1.1 g/dL    BUN/Creatinine Ratio 16.4 7.0 - 25.0    Anion Gap 12.0 5.0 - 15.0 mmol/L    eGFR 103.8 >60.0 mL/min/1.73   Ferritin    Collection Time: 08/21/24 11:09 AM    Specimen: Blood   Result Value Ref Range    Ferritin 107.00 13.00 - 150.00 ng/mL   Iron Profile    Collection Time: 08/21/24 11:09 AM    Specimen: Blood   Result Value Ref Range    Iron 42 37 - 145 mcg/dL    Iron Saturation (TSAT) 12 (L) 20 - 50 %    Transferrin 245 200 - " 360 mg/dL    TIBC 365 298 - 536 mcg/dL   Vitamin B12    Collection Time: 08/21/24 11:09 AM    Specimen: Blood   Result Value Ref Range    Vitamin B-12 928 211 - 946 pg/mL   CBC Auto Differential    Collection Time: 08/21/24 11:09 AM    Specimen: Blood   Result Value Ref Range    WBC 6.29 3.40 - 10.80 10*3/mm3    RBC 4.07 3.77 - 5.28 10*6/mm3    Hemoglobin 11.7 (L) 12.0 - 15.9 g/dL    Hematocrit 36.8 34.0 - 46.6 %    MCV 90.4 79.0 - 97.0 fL    MCH 28.7 26.6 - 33.0 pg    MCHC 31.8 31.5 - 35.7 g/dL    RDW 15.6 (H) 12.3 - 15.4 %    RDW-SD 51.7 37.0 - 54.0 fl    MPV 10.6 6.0 - 12.0 fL    Platelets 336 140 - 450 10*3/mm3    Neutrophil % 45.7 42.7 - 76.0 %    Lymphocyte % 42.9 19.6 - 45.3 %    Monocyte % 8.6 5.0 - 12.0 %    Eosinophil % 1.4 0.3 - 6.2 %    Basophil % 1.1 0.0 - 1.5 %    Immature Grans % 0.3 0.0 - 0.5 %    Neutrophils, Absolute 2.87 1.70 - 7.00 10*3/mm3    Lymphocytes, Absolute 2.70 0.70 - 3.10 10*3/mm3    Monocytes, Absolute 0.54 0.10 - 0.90 10*3/mm3    Eosinophils, Absolute 0.09 0.00 - 0.40 10*3/mm3    Basophils, Absolute 0.07 0.00 - 0.20 10*3/mm3    Immature Grans, Absolute 0.02 0.00 - 0.05 10*3/mm3    nRBC 0.0 0.0 - 0.2 /100 WBC     Down 15 lbs with Wegovy    She has a history of gastrointestinal stromal tumor (GIST) diagnosed in 2011, for 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 This has subsequently affected her platelet count. She was seen by Dr. Garcias on 05/21/2024, who noted no evidence of recurrent GIST tumor and no contraindications to the use of Ozempic from the standpoint of the GIST tumor. However, there is a potential risk of depression, requiring monitoring by Dr. Bowles. She had chemotherapy after the tumor. She saw Dr. Garcias again on 08/21/2024, who was satisfied with her stable labs. She is still at risk because of the tumor to develop medullary thyroid cancer, although her tumor is not commonly associated with it. Ozempic and Wegovy can worsen medullary thyroid cancer if  predisposed. She has no family history of medullary thyroid cancer.     She has a history of pulmonary embolism in 2011 and is under the care of Dr. Rodriguez for heart disease, TIA, and stroke related to blood pressure and episodes of atrial fibrillation. She is not currently on any anticoagulants. She has an appointment with Dr. Rodriguez in February 2025. She takes hydralazine 1.5 pills 3 times a day.   She has been prediabetic for several years, with an A1c level above 6. She discontinued metformin due to gastrointestinal side effects.   She has a diagnosis of bipolar disorder managed by Dr. Bowles.   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  Peripheral blood JAK2 V617F mutation was negative on 7/24/2015.  Thrombocytosis attributed to patient's postsplenectomy status.  Platelet count recently was elevated on 12/13/2023 of 480,000   Today, platelet count is normal at 293,000.  Sees ortho--Dr Foley for OA knees and right shoulder    Dad passed last week---she was his caregiver.  Sad  Has been having vertigo--this is positional especially when she turns her head to the left or rolls over in bed has noted it before but it has been more prevalent in the last month and has not been to PT and I will refer her to physical therapy she also has meclizine  The following portions of the patient's history were reviewed and updated as appropriate: allergies, current medications, past family history, past medical history, past social history, past surgical history, and problem list.    Review of Systems   Constitutional:  Negative for diaphoresis.   HENT:  Negative for nosebleeds and trouble swallowing.    Eyes:  Negative for blurred vision and visual  disturbance.   Respiratory:  Negative for choking.    Gastrointestinal:  Negative for blood in stool.   Allergic/Immunologic: Negative for immunocompromised state.   Neurological:  Negative for facial asymmetry and speech difficulty.   Psychiatric/Behavioral:  Negative for self-injury and suicidal ideas.        Objective   Physical Exam  Vitals and nursing note reviewed.   Constitutional:       General: She is not in acute distress.     Appearance: She is well-developed. She is not ill-appearing or toxic-appearing.   HENT:      Head: Normocephalic.      Right Ear: External ear normal.      Left Ear: External ear normal.      Nose: Nose normal.      Mouth/Throat:      Pharynx: Oropharynx is clear.   Eyes:      General: No scleral icterus.     Conjunctiva/sclera: Conjunctivae normal.      Pupils: Pupils are equal, round, and reactive to light.   Neck:      Thyroid: No thyromegaly.      Vascular: No carotid bruit.   Cardiovascular:      Rate and Rhythm: Normal rate and regular rhythm.      Pulses: Normal pulses.      Heart sounds: Normal heart sounds. No murmur heard.  Pulmonary:      Effort: Pulmonary effort is normal. No respiratory distress.      Breath sounds: Normal breath sounds.   Musculoskeletal:         General: No deformity. Normal range of motion.      Cervical back: Normal range of motion and neck supple.      Right lower leg: No edema.      Left lower leg: No edema.   Skin:     General: Skin is warm and dry.      Findings: No rash.   Neurological:      General: No focal deficit present.      Mental Status: She is alert and oriented to person, place, and time. Mental status is at baseline.   Psychiatric:         Mood and Affect: Mood normal.         Behavior: Behavior normal.         Thought Content: Thought content normal.         Judgment: Judgment normal.           Assessment & Plan   Diagnoses and all orders for this visit:    1. Primary hypertension (Primary)  -     Comprehensive metabolic panel  -      Lipid panel  -     CBC and Differential  -     TSH  -     Hemoglobin A1c  -     T4, Free  -     Vitamin B12  -     Folate  -     Vitamin D,25-Hydroxy  -     Urinalysis With Microscopic - Urine, Clean Catch    2. Prediabetes  -     Comprehensive metabolic panel  -     Lipid panel  -     CBC and Differential  -     TSH  -     Hemoglobin A1c  -     T4, Free  -     Vitamin B12  -     Folate  -     Vitamin D,25-Hydroxy  -     Urinalysis With Microscopic - Urine, Clean Catch    3. Mixed hyperlipidemia  -     Comprehensive metabolic panel  -     Lipid panel  -     CBC and Differential  -     TSH  -     Hemoglobin A1c  -     T4, Free  -     Vitamin B12  -     Folate  -     Vitamin D,25-Hydroxy  -     Urinalysis With Microscopic - Urine, Clean Catch    4. S/P splenectomy  -     Comprehensive metabolic panel  -     Lipid panel  -     CBC and Differential  -     TSH  -     Hemoglobin A1c  -     T4, Free  -     Vitamin B12  -     Folate  -     Vitamin D,25-Hydroxy  -     Urinalysis With Microscopic - Urine, Clean Catch    5. Vitamin D deficiency  -     Comprehensive metabolic panel  -     Lipid panel  -     CBC and Differential  -     TSH  -     Hemoglobin A1c  -     T4, Free  -     Vitamin B12  -     Folate  -     Vitamin D,25-Hydroxy  -     Urinalysis With Microscopic - Urine, Clean Catch    6. Hyperlipidemia, mixed  -     Comprehensive metabolic panel  -     Lipid panel  -     CBC and Differential  -     TSH  -     Hemoglobin A1c  -     T4, Free  -     Vitamin B12  -     Folate  -     Vitamin D,25-Hydroxy  -     Urinalysis With Microscopic - Urine, Clean Catch    7. Persistent atrial fibrillation  -     Comprehensive metabolic panel  -     Lipid panel  -     CBC and Differential  -     TSH  -     Hemoglobin A1c  -     T4, Free  -     Vitamin B12  -     Folate  -     Vitamin D,25-Hydroxy  -     Urinalysis With Microscopic - Urine, Clean Catch    8. Benign paroxysmal positional vertigo, unspecified laterality  -     Ambulatory  Referral to Physical Therapy for Evaluation & Treatment  -     Comprehensive metabolic panel  -     Lipid panel  -     CBC and Differential  -     TSH  -     Hemoglobin A1c  -     T4, Free  -     Vitamin B12  -     Folate  -     Vitamin D,25-Hydroxy  -     Urinalysis With Microscopic - Urine, Clean Catch    9. Bipolar 1 disorder  -     Comprehensive metabolic panel  -     Lipid panel  -     CBC and Differential  -     TSH  -     Hemoglobin A1c  -     T4, Free  -     Vitamin B12  -     Folate  -     Vitamin D,25-Hydroxy  -     Urinalysis With Microscopic - Urine, Clean Catch    Other orders  -     Semaglutide-Weight Management (Wegovy) 2.4 MG/0.75ML solution auto-injector; Inject 0.75 mL under the skin into the appropriate area as directed 1 (One) Time Per Week. Inject 2.4 mg subcutaneously once weekly for treatment of obesity  Dispense: 3 mL; Refill: 11      Plan, Henny James, was seen today.  she was seen for HTN and continue medication, Imparied fasting glucose and plan follow up labs, diet, and exercise, Hyperlipidemia and will work on this with diet and exercise, and Vitamin D deficiency and will update labs .  This is the best blood pressure I have seen her have since had been her primary care provider... Getting your weight down is definitely helping her blood pressure and she is on maximum amount of blood pressure meds cardiology has her on every med possible for hypertension and cannot offer her any other medication... She would have to go to [facility] or [facility]..  I am very pleased with the blood pressure today... As I get her weight down she needs to watch for low readings and let me know  Has been having vertigo--this is positional especially when she turns her head to the left or rolls over in bed has noted it before but it has been more prevalent in the last month and has not been to PT and I will refer her to physical therapy she also has meclizine     I do want to get her weight down  more and we will increase the Wegovy to the 2.4 mg weekly and have her work on diet and exercise along with this dose.  She has noted that she has been more hungry  The weight loss has helped her knees  Under care of Dr. Bowles psychiatry for bipolar disorder  Does need follow-up with Dr. Rodriguez her cardiologist she does have A-fib in coronary artery calcifications and they hypertension  Also note that she has had splenectomy    Overdue to see Dr. Rodriguez cardiologist  May have to change to Zepbound  Get colonoscopy screen  Declines vaccines

## 2025-05-29 ENCOUNTER — RESULTS FOLLOW-UP (OUTPATIENT)
Dept: FAMILY MEDICINE CLINIC | Facility: CLINIC | Age: 65
End: 2025-05-29
Payer: OTHER GOVERNMENT

## 2025-05-29 DIAGNOSIS — E87.6 HYPOKALEMIA: Primary | ICD-10-CM

## 2025-05-29 LAB
25(OH)D3+25(OH)D2 SERPL-MCNC: 56.2 NG/ML (ref 30–100)
ALBUMIN SERPL-MCNC: 4.3 G/DL (ref 3.9–4.9)
ALP SERPL-CCNC: 111 IU/L (ref 44–121)
ALT SERPL-CCNC: 16 IU/L (ref 0–32)
APPEARANCE UR: CLEAR
AST SERPL-CCNC: 18 IU/L (ref 0–40)
BACTERIA #/AREA URNS HPF: ABNORMAL /[HPF]
BASOPHILS # BLD AUTO: 0.1 X10E3/UL (ref 0–0.2)
BASOPHILS NFR BLD AUTO: 1 %
BILIRUB SERPL-MCNC: <0.2 MG/DL (ref 0–1.2)
BILIRUB UR QL STRIP: NEGATIVE
BUN SERPL-MCNC: 8 MG/DL (ref 8–27)
BUN/CREAT SERPL: 10 (ref 12–28)
CALCIUM SERPL-MCNC: 9.6 MG/DL (ref 8.7–10.3)
CASTS URNS QL MICRO: ABNORMAL /LPF
CHLORIDE SERPL-SCNC: 102 MMOL/L (ref 96–106)
CHOLEST SERPL-MCNC: 238 MG/DL (ref 100–199)
CO2 SERPL-SCNC: 22 MMOL/L (ref 20–29)
COLOR UR: YELLOW
CREAT SERPL-MCNC: 0.8 MG/DL (ref 0.57–1)
EGFRCR SERPLBLD CKD-EPI 2021: 83 ML/MIN/1.73
EOSINOPHIL # BLD AUTO: 0.1 X10E3/UL (ref 0–0.4)
EOSINOPHIL NFR BLD AUTO: 1 %
EPI CELLS #/AREA URNS HPF: ABNORMAL /HPF (ref 0–10)
ERYTHROCYTE [DISTWIDTH] IN BLOOD BY AUTOMATED COUNT: 15 % (ref 11.7–15.4)
FOLATE SERPL-MCNC: 9.5 NG/ML
GLOBULIN SER CALC-MCNC: 3.7 G/DL (ref 1.5–4.5)
GLUCOSE SERPL-MCNC: 82 MG/DL (ref 70–99)
GLUCOSE UR QL STRIP: NEGATIVE
HBA1C MFR BLD: 6.2 % (ref 4.8–5.6)
HCT VFR BLD AUTO: 40 % (ref 34–46.6)
HDLC SERPL-MCNC: 70 MG/DL
HGB BLD-MCNC: 12.8 G/DL (ref 11.1–15.9)
HGB UR QL STRIP: NEGATIVE
IMM GRANULOCYTES # BLD AUTO: 0 X10E3/UL (ref 0–0.1)
IMM GRANULOCYTES NFR BLD AUTO: 0 %
KETONES UR QL STRIP: NEGATIVE
LDLC SERPL CALC-MCNC: 144 MG/DL (ref 0–99)
LEUKOCYTE ESTERASE UR QL STRIP: NEGATIVE
LYMPHOCYTES # BLD AUTO: 2.9 X10E3/UL (ref 0.7–3.1)
LYMPHOCYTES NFR BLD AUTO: 32 %
MCH RBC QN AUTO: 28.6 PG (ref 26.6–33)
MCHC RBC AUTO-ENTMCNC: 32 G/DL (ref 31.5–35.7)
MCV RBC AUTO: 90 FL (ref 79–97)
MICRO URNS: NORMAL
MICRO URNS: NORMAL
MONOCYTES # BLD AUTO: 0.6 X10E3/UL (ref 0.1–0.9)
MONOCYTES NFR BLD AUTO: 7 %
NEUTROPHILS # BLD AUTO: 5.2 X10E3/UL (ref 1.4–7)
NEUTROPHILS NFR BLD AUTO: 59 %
NITRITE UR QL STRIP: NEGATIVE
PH UR STRIP: 7.5 [PH] (ref 5–7.5)
PLATELET # BLD AUTO: 482 X10E3/UL (ref 150–450)
POTASSIUM SERPL-SCNC: 3.4 MMOL/L (ref 3.5–5.2)
PROT SERPL-MCNC: 8 G/DL (ref 6–8.5)
PROT UR QL STRIP: NEGATIVE
RBC # BLD AUTO: 4.47 X10E6/UL (ref 3.77–5.28)
RBC #/AREA URNS HPF: ABNORMAL /HPF (ref 0–2)
SODIUM SERPL-SCNC: 142 MMOL/L (ref 134–144)
SP GR UR STRIP: 1.01 (ref 1–1.03)
T4 FREE SERPL-MCNC: 1.12 NG/DL (ref 0.82–1.77)
TRIGL SERPL-MCNC: 139 MG/DL (ref 0–149)
TSH SERPL DL<=0.005 MIU/L-ACNC: 1.91 UIU/ML (ref 0.45–4.5)
UROBILINOGEN UR STRIP-MCNC: 0.2 MG/DL (ref 0.2–1)
VIT B12 SERPL-MCNC: 1625 PG/ML (ref 232–1245)
VLDLC SERPL CALC-MCNC: 24 MG/DL (ref 5–40)
WBC # BLD AUTO: 8.9 X10E3/UL (ref 3.4–10.8)
WBC #/AREA URNS HPF: ABNORMAL /HPF (ref 0–5)

## 2025-05-29 RX ORDER — POTASSIUM CHLORIDE 750 MG/1
TABLET, EXTENDED RELEASE ORAL
Qty: 65 TABLET | Refills: 5 | Status: SHIPPED | OUTPATIENT
Start: 2025-05-29

## 2025-06-26 ENCOUNTER — TELEPHONE (OUTPATIENT)
Dept: FAMILY MEDICINE CLINIC | Facility: CLINIC | Age: 65
End: 2025-06-26
Payer: OTHER GOVERNMENT

## 2025-06-26 DIAGNOSIS — I10 PRIMARY HYPERTENSION: ICD-10-CM

## 2025-06-26 DIAGNOSIS — I10 UNCONTROLLED HYPERTENSION: ICD-10-CM

## 2025-06-26 DIAGNOSIS — E78.00 HYPERCHOLESTEREMIA: ICD-10-CM

## 2025-06-26 DIAGNOSIS — R73.03 PREDIABETES: ICD-10-CM

## 2025-06-26 DIAGNOSIS — I25.10 CORONARY ARTERY DISEASE INVOLVING NATIVE CORONARY ARTERY OF NATIVE HEART WITHOUT ANGINA PECTORIS: Primary | ICD-10-CM

## 2025-06-26 NOTE — TELEPHONE ENCOUNTER
Spoke with patient to inform her that her message was sent to her provider and per Mary Haddad  She needs to find out if Zepbound is a option on her insurance before I send it to pharmacy. Patient voiced understanding and had no further questions.

## 2025-06-26 NOTE — TELEPHONE ENCOUNTER
You can see what she needs?   She needs to find out if Zepbound is a option on her insurance before I send it to pharmacy

## 2025-06-26 NOTE — TELEPHONE ENCOUNTER
Caller: Henny James    Relationship: Self    Best call back number:     What is the best time to reach you:     Who are you requesting to speak with (clinical staff, provider,  specific staff member):     Do you know the name of the person who called:     What was the call regarding: PATIENT SAYS SHE HAS BEEN TAKING OZEMPIC FOR ABOUT 5 MONTHS NOW AND DOES NOT FEEL LIKE SHE IS LOSING WEIGHT AT ALL.  SHE SAYS SHE WANTS TO BE CALLED TO DISCUSS BEING PUT ON ZEPBOUD INSTEAD.  SHE MENTIONED THAT SHE IS NOT ON MYCHART AND WILL NEED TO BE CALLED TO DISCUSS A LAB RESULT.

## 2025-06-27 ENCOUNTER — TELEPHONE (OUTPATIENT)
Dept: FAMILY MEDICINE CLINIC | Facility: CLINIC | Age: 65
End: 2025-06-27
Payer: OTHER GOVERNMENT

## 2025-06-27 NOTE — TELEPHONE ENCOUNTER
Will have her start Zepbound and I will send it to pharmacy and slowly increase dose monthly if need

## 2025-06-27 NOTE — TELEPHONE ENCOUNTER
Henny James (Key: [id number]) - [id number]  Zepbound 2.5MG/0.5ML pen-injectors  status: PA Response - ApprovedCreated: June 27th, 2025 [phone]: June 27th, 2025

## 2025-07-07 ENCOUNTER — HOSPITAL ENCOUNTER (OUTPATIENT)
Dept: MAMMOGRAPHY | Facility: HOSPITAL | Age: 65
Discharge: HOME | End: 2025-07-07
Admitting: PHYSICIAN ASSISTANT
Payer: OTHER GOVERNMENT

## 2025-07-15 ENCOUNTER — TELEPHONE (OUTPATIENT)
Dept: FAMILY MEDICINE CLINIC | Facility: CLINIC | Age: 65
End: 2025-07-15
Payer: OTHER GOVERNMENT

## 2025-07-15 DIAGNOSIS — E66.9 OBESITY (BMI 30-39.9): ICD-10-CM

## 2025-07-15 DIAGNOSIS — E78.2 MIXED HYPERLIPIDEMIA: ICD-10-CM

## 2025-07-15 DIAGNOSIS — R73.03 PREDIABETES: ICD-10-CM

## 2025-07-15 DIAGNOSIS — I25.10 CORONARY ARTERY DISEASE INVOLVING NATIVE CORONARY ARTERY OF NATIVE HEART WITHOUT ANGINA PECTORIS: Primary | ICD-10-CM

## 2025-07-15 DIAGNOSIS — I10 PRIMARY HYPERTENSION: ICD-10-CM

## 2025-07-15 NOTE — TELEPHONE ENCOUNTER
Caller: Henny James    Relationship: Self    Best call back number:[phone]      What medication are you requesting: Tirzepatide-Weight Management (ZEPBOUND)     Have you had these symptoms before:    [x] Yes  [] No    Have you been treated for these symptoms before:   [x] Yes  [] No    If a prescription is needed, what is your preferred pharmacy and phone number: [id number] [place], [place] - [address] AT [facility] [address] [facility] [phone] [facility] [phone] FX     Additional notes:  WANTS TO INCREASE THE DOSAGE.  THIS DOSAGE IS DOING NOTHING FOR HER.

## 2025-08-07 DIAGNOSIS — I10 PRIMARY HYPERTENSION: ICD-10-CM

## 2025-08-07 DIAGNOSIS — E66.9 OBESITY (BMI 30-39.9): ICD-10-CM

## 2025-08-07 DIAGNOSIS — I25.10 CORONARY ARTERY DISEASE INVOLVING NATIVE CORONARY ARTERY OF NATIVE HEART WITHOUT ANGINA PECTORIS: ICD-10-CM

## 2025-08-07 DIAGNOSIS — R73.03 PREDIABETES: ICD-10-CM

## 2025-08-07 DIAGNOSIS — E78.2 MIXED HYPERLIPIDEMIA: ICD-10-CM

## 2025-08-13 ENCOUNTER — TELEPHONE (OUTPATIENT)
Dept: FAMILY MEDICINE CLINIC | Facility: CLINIC | Age: 65
End: 2025-08-13
Payer: OTHER GOVERNMENT

## 2025-08-13 ENCOUNTER — TELEPHONE (OUTPATIENT)
Dept: ONCOLOGY | Facility: CLINIC | Age: 65
End: 2025-08-13
Payer: OTHER GOVERNMENT

## 2025-08-26 ENCOUNTER — TELEPHONE (OUTPATIENT)
Dept: FAMILY MEDICINE CLINIC | Facility: CLINIC | Age: 65
End: 2025-08-26
Payer: OTHER GOVERNMENT

## 2025-08-26 DIAGNOSIS — I10 PRIMARY HYPERTENSION: ICD-10-CM

## 2025-08-26 DIAGNOSIS — I25.10 CORONARY ARTERY DISEASE INVOLVING NATIVE CORONARY ARTERY OF NATIVE HEART WITHOUT ANGINA PECTORIS: ICD-10-CM

## 2025-08-26 DIAGNOSIS — E78.2 MIXED HYPERLIPIDEMIA: ICD-10-CM

## 2025-08-26 DIAGNOSIS — R73.03 PREDIABETES: Primary | ICD-10-CM

## 2025-08-26 DIAGNOSIS — E66.9 OBESITY (BMI 30-39.9): ICD-10-CM
